# Patient Record
Sex: MALE
[De-identification: names, ages, dates, MRNs, and addresses within clinical notes are randomized per-mention and may not be internally consistent; named-entity substitution may affect disease eponyms.]

---

## 2022-10-28 ENCOUNTER — NURSE TRIAGE (OUTPATIENT)
Dept: OTHER | Facility: CLINIC | Age: 77
End: 2022-10-28

## 2022-10-28 NOTE — TELEPHONE ENCOUNTER
Location of patient: [place]    Subjective: Caller states Sunday was putting on shoe and toenail pulled back, taped it down, now getting red, \"not great to walk on\"     Current Symptoms: left foot great toe,toe nail yellowish, mild swelling,redness on toe starting at [place] and goes to top of toe    Onset: Sunday    Associated Symptoms: NA    Pain Severity: 1/10 when sitting 3/10 when walking     Temperature: Denies     What has been tried: Neoporin     Recommended disposition: See PCP within 3 Days  UCCif no appt. available   Care advice provided, patient verbalizes understanding; denies any other questions or concerns; instructed to call back for any new or worsening symptoms. Patient/caller agrees to follow-up with PCP Sukh Artis [id number] if needed     This triage is a result of a call to [address] [place] [address]. Please do not respond to the triage nurse through this encounter. Any subsequent communication should be directly with the patient. Reason for Disposition   Localized redness and swelling of skin around nail (i.e., cuticle area or nail fold)    Protocols used:  Toe Pain-ADULT-[place]

## 2023-02-19 PROBLEM — I25.10 CORONARY ARTERIOSCLEROSIS: Status: ACTIVE | Noted: 2020-06-12

## 2023-02-19 PROBLEM — D64.9 ANEMIA: Status: ACTIVE | Noted: 2019-03-12

## 2023-02-19 PROBLEM — N40.0 BENIGN PROSTATIC HYPERPLASIA WITHOUT URINARY OBSTRUCTION: Status: ACTIVE | Noted: 2019-03-12

## 2023-02-19 PROBLEM — M43.10 ACQUIRED SPONDYLOLISTHESIS: Status: ACTIVE | Noted: 2023-02-19

## 2023-02-19 PROBLEM — G47.33 OBSTRUCTIVE SLEEP APNEA SYNDROME: Status: ACTIVE | Noted: 2019-03-12

## 2023-02-19 PROBLEM — E66.01 MORBID OBESITY (MULTI): Status: ACTIVE | Noted: 2019-03-12

## 2023-02-19 RX ORDER — DOXAZOSIN 4 MG/1
1 TABLET ORAL DAILY
COMMUNITY
Start: 2012-07-05 | End: 2023-03-13

## 2023-02-19 RX ORDER — TRIAMCINOLONE ACETONIDE 1 MG/G
CREAM TOPICAL 2 TIMES DAILY PRN
COMMUNITY
Start: 2017-05-19

## 2023-02-19 RX ORDER — SILDENAFIL 50 MG/1
2 TABLET, FILM COATED ORAL AS NEEDED
COMMUNITY
Start: 2021-12-20

## 2023-02-19 RX ORDER — FOLIC ACID 1 MG/1
1 TABLET ORAL DAILY
COMMUNITY

## 2023-02-19 RX ORDER — NYSTATIN 100000 [USP'U]/G
POWDER TOPICAL 2 TIMES DAILY
COMMUNITY
Start: 2021-09-28

## 2023-02-19 RX ORDER — HYDROCHLOROTHIAZIDE 25 MG/1
1 TABLET ORAL DAILY
COMMUNITY
Start: 2020-02-03 | End: 2023-05-09 | Stop reason: SDUPTHER

## 2023-02-19 RX ORDER — PIOGLITAZONEHYDROCHLORIDE 15 MG/1
1 TABLET ORAL DAILY
COMMUNITY
Start: 2018-12-21 | End: 2023-03-13

## 2023-02-19 RX ORDER — LOSARTAN POTASSIUM 100 MG/1
1 TABLET ORAL DAILY
COMMUNITY
Start: 2020-02-03 | End: 2023-05-08 | Stop reason: SDUPTHER

## 2023-02-19 RX ORDER — ATORVASTATIN CALCIUM 10 MG/1
20 TABLET, FILM COATED ORAL DAILY
COMMUNITY
Start: 2006-02-17 | End: 2023-04-03 | Stop reason: DRUGHIGH

## 2023-02-19 RX ORDER — ECONAZOLE NITRATE 10 MG/G
CREAM TOPICAL 2 TIMES DAILY PRN
COMMUNITY

## 2023-02-19 RX ORDER — ACETAMINOPHEN, DEXTROMETHORPHAN HBR, DOXYLAMINE SUCCINATE, PHENYLEPHRINE HCL 650; 20; 12.5; 1 MG/30ML; MG/30ML; MG/30ML; MG/30ML
1 SOLUTION ORAL DAILY
COMMUNITY

## 2023-02-19 RX ORDER — AMLODIPINE BESYLATE 10 MG/1
1 TABLET ORAL DAILY
COMMUNITY
Start: 2017-12-14 | End: 2023-03-13

## 2023-02-19 RX ORDER — METFORMIN HYDROCHLORIDE 1000 MG/1
1 TABLET ORAL 2 TIMES DAILY
COMMUNITY
Start: 2011-04-15 | End: 2023-03-13

## 2023-02-19 RX ORDER — LORATADINE 10 MG/1
1 TABLET ORAL DAILY
COMMUNITY

## 2023-02-19 RX ORDER — TRIAMCINOLONE ACETONIDE 55 UG/1
1-2 SPRAY, METERED NASAL DAILY
COMMUNITY

## 2023-02-19 RX ORDER — SPIRONOLACTONE 25 MG/1
1 TABLET ORAL DAILY
COMMUNITY
Start: 2010-03-23 | End: 2023-03-13

## 2023-03-13 DIAGNOSIS — E11.69 TYPE 2 DIABETES MELLITUS WITH OTHER SPECIFIED COMPLICATION, WITHOUT LONG-TERM CURRENT USE OF INSULIN (MULTI): ICD-10-CM

## 2023-03-13 DIAGNOSIS — I10 HYPERTENSION, UNSPECIFIED TYPE: ICD-10-CM

## 2023-03-13 DIAGNOSIS — N40.0 BENIGN PROSTATIC HYPERPLASIA, UNSPECIFIED WHETHER LOWER URINARY TRACT SYMPTOMS PRESENT: Primary | ICD-10-CM

## 2023-03-13 RX ORDER — DOXAZOSIN 4 MG/1
TABLET ORAL
Qty: 90 TABLET | Refills: 3 | Status: SHIPPED | OUTPATIENT
Start: 2023-03-13 | End: 2024-05-22 | Stop reason: SDUPTHER

## 2023-03-13 RX ORDER — AMLODIPINE BESYLATE 10 MG/1
TABLET ORAL
Qty: 90 TABLET | Refills: 3 | Status: SHIPPED | OUTPATIENT
Start: 2023-03-13 | End: 2023-10-03 | Stop reason: SDUPTHER

## 2023-03-13 RX ORDER — METFORMIN HYDROCHLORIDE 1000 MG/1
TABLET ORAL
Qty: 180 TABLET | Refills: 3 | Status: SHIPPED | OUTPATIENT
Start: 2023-03-13 | End: 2023-10-03 | Stop reason: SDUPTHER

## 2023-03-13 RX ORDER — SPIRONOLACTONE 25 MG/1
TABLET ORAL
Qty: 90 TABLET | Refills: 3 | Status: SHIPPED | OUTPATIENT
Start: 2023-03-13 | End: 2024-03-15 | Stop reason: SDUPTHER

## 2023-03-13 RX ORDER — PIOGLITAZONEHYDROCHLORIDE 15 MG/1
TABLET ORAL
Qty: 90 TABLET | Refills: 3 | Status: SHIPPED | OUTPATIENT
Start: 2023-03-13 | End: 2023-10-03 | Stop reason: SDUPTHER

## 2023-03-14 LAB
APPEARANCE, URINE: CLEAR
BILIRUBIN, URINE: NEGATIVE
BLOOD, URINE: NEGATIVE
COLOR, URINE: YELLOW
GLUCOSE, URINE: NEGATIVE MG/DL
KETONES, URINE: NEGATIVE MG/DL
LEUKOCYTE ESTERASE, URINE: NEGATIVE
NITRITE, URINE: NEGATIVE
PH, URINE: 6 (ref 5–8)
PROTEIN, URINE: NEGATIVE MG/DL
SPECIFIC GRAVITY, URINE: 1.01 (ref 1–1.03)
UROBILINOGEN, URINE: <2 MG/DL (ref 0–1.9)

## 2023-03-21 ENCOUNTER — APPOINTMENT (OUTPATIENT)
Dept: PRIMARY CARE | Facility: CLINIC | Age: 78
End: 2023-03-21
Payer: COMMERCIAL

## 2023-04-03 ENCOUNTER — OFFICE VISIT (OUTPATIENT)
Dept: PRIMARY CARE | Facility: CLINIC | Age: 78
End: 2023-04-03
Payer: COMMERCIAL

## 2023-04-03 VITALS
HEART RATE: 84 BPM | HEIGHT: 66 IN | WEIGHT: 264 LBS | OXYGEN SATURATION: 98 % | BODY MASS INDEX: 42.43 KG/M2 | DIASTOLIC BLOOD PRESSURE: 82 MMHG | TEMPERATURE: 97.6 F | SYSTOLIC BLOOD PRESSURE: 122 MMHG

## 2023-04-03 DIAGNOSIS — I10 ESSENTIAL HYPERTENSION: Primary | ICD-10-CM

## 2023-04-03 DIAGNOSIS — E11.69 TYPE 2 DIABETES MELLITUS WITH OTHER SPECIFIED COMPLICATION, WITHOUT LONG-TERM CURRENT USE OF INSULIN (MULTI): ICD-10-CM

## 2023-04-03 DIAGNOSIS — E78.5 HYPERLIPIDEMIA, UNSPECIFIED HYPERLIPIDEMIA TYPE: ICD-10-CM

## 2023-04-03 DIAGNOSIS — N40.0 BENIGN PROSTATIC HYPERPLASIA WITHOUT URINARY OBSTRUCTION: ICD-10-CM

## 2023-04-03 DIAGNOSIS — D64.9 ANEMIA, UNSPECIFIED TYPE: ICD-10-CM

## 2023-04-03 DIAGNOSIS — L29.9 PRURITUS: ICD-10-CM

## 2023-04-03 DIAGNOSIS — R06.00 DYSPNEA, UNSPECIFIED TYPE: ICD-10-CM

## 2023-04-03 DIAGNOSIS — I25.10 CORONARY ARTERIOSCLEROSIS: ICD-10-CM

## 2023-04-03 PROCEDURE — 3079F DIAST BP 80-89 MM HG: CPT | Performed by: FAMILY MEDICINE

## 2023-04-03 PROCEDURE — 1160F RVW MEDS BY RX/DR IN RCRD: CPT | Performed by: FAMILY MEDICINE

## 2023-04-03 PROCEDURE — 3074F SYST BP LT 130 MM HG: CPT | Performed by: FAMILY MEDICINE

## 2023-04-03 PROCEDURE — 99214 OFFICE O/P EST MOD 30 MIN: CPT | Performed by: FAMILY MEDICINE

## 2023-04-03 PROCEDURE — 1159F MED LIST DOCD IN RCRD: CPT | Performed by: FAMILY MEDICINE

## 2023-04-03 PROCEDURE — 1036F TOBACCO NON-USER: CPT | Performed by: FAMILY MEDICINE

## 2023-04-03 RX ORDER — ASPIRIN 81 MG/1
1 TABLET ORAL DAILY
COMMUNITY

## 2023-04-03 RX ORDER — MULTIVITAMIN
TABLET ORAL
COMMUNITY

## 2023-04-03 RX ORDER — ATORVASTATIN CALCIUM 20 MG/1
20 TABLET, FILM COATED ORAL DAILY
Qty: 30 TABLET | Refills: 5 | Status: SHIPPED | OUTPATIENT
Start: 2023-04-03 | End: 2023-09-13 | Stop reason: SDUPTHER

## 2023-04-03 RX ORDER — PREDNISONE 20 MG/1
40 TABLET ORAL DAILY
Qty: 10 TABLET | Refills: 0 | Status: SHIPPED | OUTPATIENT
Start: 2023-04-03 | End: 2023-04-08

## 2023-04-03 ASSESSMENT — PATIENT HEALTH QUESTIONNAIRE - PHQ9
1. LITTLE INTEREST OR PLEASURE IN DOING THINGS: NOT AT ALL
SUM OF ALL RESPONSES TO PHQ9 QUESTIONS 1 AND 2: 0
2. FEELING DOWN, DEPRESSED OR HOPELESS: NOT AT ALL

## 2023-04-03 NOTE — PROGRESS NOTES
"Subjective   Patient ID: Tomas Morataya is a 78 y.o. male who presents for Hypertension, Diabetes, Hyperlipidemia (Review BW), and Itching (All over x 3 weeks) and multiple issues    HPI   DM: Due for recheck.  Did not have blood work  Lipids: Have been stable  Hypertension: stable and controlled  CAD: Stable.  No chest pains  Anemia stable.  Recent H and H12.4/38.1. no unusual bleeding  BPH: Stable.  Recent he had hydrocele surgery  Chronic SOB: Requesting handicap placard.  Often has trouble walking long distances.  Pruritus: Onset x 10 wks. Completed steroids. Min helped. No new exposures. Worse on arms and back. Wife w/o symptoms. No assoc F/Chlls/CP/SOB. Very itchy.     Review of Systems   Constitutional:  Negative for fatigue.   HENT: Negative.     Eyes:  Negative for visual disturbance.   Respiratory:  Negative for shortness of breath.    Cardiovascular:  Negative for chest pain and palpitations.   Gastrointestinal:  Negative for abdominal distention, abdominal pain, blood in stool, constipation, diarrhea, nausea and vomiting.   Endocrine: Negative for cold intolerance, heat intolerance, polydipsia, polyphagia and polyuria.   Genitourinary:  Negative for difficulty urinating and dysuria.   Musculoskeletal:  Negative for arthralgias and myalgias.   Skin:         As above   Neurological:  Negative for dizziness and headaches.   Psychiatric/Behavioral:  Negative for sleep disturbance.        Objective   /82   Pulse 84   Temp 36.4 °C (97.6 °F)   Ht 1.676 m (5' 6\")   Wt 120 kg (264 lb)   SpO2 98%   BMI 42.61 kg/m²     Physical Exam  Vitals and nursing note reviewed.   Constitutional:       General: He is not in acute distress.     Appearance: Normal appearance. He is not toxic-appearing.   HENT:      Head: Normocephalic.   Eyes:      Pupils: Pupils are equal, round, and reactive to light.   Cardiovascular:      Rate and Rhythm: Normal rate and regular rhythm.      Pulses: Normal pulses.      Heart " sounds: No murmur heard.  Pulmonary:      Effort: Pulmonary effort is normal. No respiratory distress.      Breath sounds: Normal breath sounds.   Abdominal:      General: Bowel sounds are normal.      Palpations: Abdomen is soft.      Tenderness: There is no abdominal tenderness. There is no guarding.   Musculoskeletal:         General: No tenderness.   Skin:     General: Skin is warm.      Comments: +large confluent erythematous raised patches b/l upper extremities. No vesicles. +excoriations b/l arms and upper back.    Neurological:      General: No focal deficit present.      Mental Status: He is alert.      Cranial Nerves: No cranial nerve deficit.   Psychiatric:         Mood and Affect: Mood normal.         Assessment/Plan   Problem List Items Addressed This Visit          Circulatory    Essential hypertension - Primary    Relevant Medications    atorvastatin (Lipitor) 20 mg tablet    Other Relevant Orders    Comprehensive Metabolic Panel    Follow Up In Primary Care    Comprehensive Metabolic Panel    Lipid Panel    Coronary arteriosclerosis    Relevant Medications    atorvastatin (Lipitor) 20 mg tablet    Other Relevant Orders    Comprehensive Metabolic Panel    Follow Up In Primary Care    Comprehensive Metabolic Panel    Lipid Panel       Genitourinary    Benign prostatic hyperplasia without urinary obstruction    Relevant Orders    Follow Up In Primary Care       Endocrine/Metabolic    Type 2 diabetes mellitus (CMS/HCC)    Relevant Medications    atorvastatin (Lipitor) 20 mg tablet    Other Relevant Orders    Comprehensive Metabolic Panel    Hemoglobin A1C    Follow Up In Primary Care    Comprehensive Metabolic Panel    Hemoglobin A1C    Lipid Panel       Hematologic    Anemia    Relevant Orders    Follow Up In Primary Care       Other    Hyperlipidemia    Relevant Medications    atorvastatin (Lipitor) 20 mg tablet    Other Relevant Orders    Comprehensive Metabolic Panel    Follow Up In Primary Care     Comprehensive Metabolic Panel    Lipid Panel     Other Visit Diagnoses       Pruritus        Relevant Medications    predniSONE (Deltasone) 20 mg tablet    Other Relevant Orders    Comprehensive Metabolic Panel    Dyspnea, unspecified type        Relevant Orders    Disability Placard        Reviewed blood work

## 2023-04-03 NOTE — PATIENT INSTRUCTIONS
Obtain your blood work    Start steroids. If no improvement, recommend you follow up with dermatology. Monitor your blood sugar.    Continue your other meds.     Recommend a predominantly whole foods plant based diet.  Cut back on meat, dairy, salt and oils. Increase fiber in your diet.  Recommend regular exercise most days of the week.    Follow up with your specialists as scheduled    Follow up in 6 months, sooner if needed

## 2023-04-04 ASSESSMENT — ENCOUNTER SYMPTOMS
ROS SKIN COMMENTS: AS ABOVE
CONSTIPATION: 0
POLYDIPSIA: 0
VOMITING: 0
HEADACHES: 0
ABDOMINAL PAIN: 0
DYSURIA: 0
SHORTNESS OF BREATH: 0
MYALGIAS: 0
DIARRHEA: 0
BLOOD IN STOOL: 0
PALPITATIONS: 0
ARTHRALGIAS: 0
DIFFICULTY URINATING: 0
DIZZINESS: 0
NAUSEA: 0
FATIGUE: 0
POLYPHAGIA: 0
ABDOMINAL DISTENTION: 0
SLEEP DISTURBANCE: 0

## 2023-04-07 ENCOUNTER — LAB (OUTPATIENT)
Dept: LAB | Facility: LAB | Age: 78
End: 2023-04-07
Payer: COMMERCIAL

## 2023-04-07 DIAGNOSIS — I10 ESSENTIAL HYPERTENSION: ICD-10-CM

## 2023-04-07 DIAGNOSIS — I25.10 CORONARY ARTERIOSCLEROSIS: ICD-10-CM

## 2023-04-07 DIAGNOSIS — E78.5 HYPERLIPIDEMIA, UNSPECIFIED HYPERLIPIDEMIA TYPE: ICD-10-CM

## 2023-04-07 DIAGNOSIS — E11.69 TYPE 2 DIABETES MELLITUS WITH OTHER SPECIFIED COMPLICATION, WITHOUT LONG-TERM CURRENT USE OF INSULIN (MULTI): ICD-10-CM

## 2023-04-07 DIAGNOSIS — L29.9 PRURITUS: ICD-10-CM

## 2023-04-07 LAB
ALANINE AMINOTRANSFERASE (SGPT) (U/L) IN SER/PLAS: 18 U/L (ref 10–52)
ALBUMIN (G/DL) IN SER/PLAS: 3.4 G/DL (ref 3.4–5)
ALKALINE PHOSPHATASE (U/L) IN SER/PLAS: 60 U/L (ref 33–136)
ANION GAP IN SER/PLAS: 14 MMOL/L (ref 10–20)
ASPARTATE AMINOTRANSFERASE (SGOT) (U/L) IN SER/PLAS: 15 U/L (ref 9–39)
BILIRUBIN TOTAL (MG/DL) IN SER/PLAS: 0.5 MG/DL (ref 0–1.2)
CALCIUM (MG/DL) IN SER/PLAS: 8.6 MG/DL (ref 8.6–10.3)
CARBON DIOXIDE, TOTAL (MMOL/L) IN SER/PLAS: 24 MMOL/L (ref 21–32)
CHLORIDE (MMOL/L) IN SER/PLAS: 102 MMOL/L (ref 98–107)
CREATININE (MG/DL) IN SER/PLAS: 0.95 MG/DL (ref 0.5–1.3)
ESTIMATED AVERAGE GLUCOSE FOR HBA1C: 166 MG/DL
GFR MALE: 83 ML/MIN/1.73M2
GLUCOSE (MG/DL) IN SER/PLAS: 248 MG/DL (ref 74–99)
HEMOGLOBIN A1C/HEMOGLOBIN TOTAL IN BLOOD: 7.4 %
POTASSIUM (MMOL/L) IN SER/PLAS: 4.5 MMOL/L (ref 3.5–5.3)
PROTEIN TOTAL: 6 G/DL (ref 6.4–8.2)
SODIUM (MMOL/L) IN SER/PLAS: 135 MMOL/L (ref 136–145)
UREA NITROGEN (MG/DL) IN SER/PLAS: 25 MG/DL (ref 6–23)

## 2023-04-07 PROCEDURE — 80053 COMPREHEN METABOLIC PANEL: CPT

## 2023-04-07 PROCEDURE — 36415 COLL VENOUS BLD VENIPUNCTURE: CPT

## 2023-04-07 PROCEDURE — 83036 HEMOGLOBIN GLYCOSYLATED A1C: CPT

## 2023-04-21 ENCOUNTER — TELEPHONE (OUTPATIENT)
Dept: PRIMARY CARE | Facility: CLINIC | Age: 78
End: 2023-04-21
Payer: COMMERCIAL

## 2023-04-21 NOTE — TELEPHONE ENCOUNTER
----- Message from Raquel Galvez DO sent at 4/21/2023  1:55 PM EDT -----  Tell patient blood sugar is exactly the same as last visit.  A1c 7.4.  Continue current meds.  Follow-up as scheduled, sooner if needed

## 2023-05-08 DIAGNOSIS — I10 ESSENTIAL HYPERTENSION: Primary | ICD-10-CM

## 2023-05-08 NOTE — TELEPHONE ENCOUNTER
Pt called requesting a refill on pended med and hydrochlorothiazide 25mg, I informed pt that hydrochlorothiazide was discontinued and he stated he never stopped med and needs refills because he is OUT.  Pt need short supply - [facility]  90 - [facility].

## 2023-05-09 RX ORDER — HYDROCHLOROTHIAZIDE 25 MG/1
25 TABLET ORAL DAILY
Qty: 90 TABLET | Refills: 0 | Status: SHIPPED | OUTPATIENT
Start: 2023-05-09 | End: 2024-03-26 | Stop reason: SDUPTHER

## 2023-05-09 RX ORDER — HYDROCHLOROTHIAZIDE 25 MG/1
25 TABLET ORAL DAILY
Qty: 30 TABLET | Refills: 0 | Status: SHIPPED | OUTPATIENT
Start: 2023-05-09 | End: 2023-09-08 | Stop reason: SDUPTHER

## 2023-05-09 RX ORDER — LOSARTAN POTASSIUM 100 MG/1
100 TABLET ORAL DAILY
Qty: 90 TABLET | Refills: 0 | Status: SHIPPED | OUTPATIENT
Start: 2023-05-09 | End: 2023-07-27 | Stop reason: SDUPTHER

## 2023-05-31 DIAGNOSIS — I10 ESSENTIAL HYPERTENSION: ICD-10-CM

## 2023-06-02 RX ORDER — HYDROCHLOROTHIAZIDE 25 MG/1
TABLET ORAL
Qty: 30 TABLET | Refills: 0 | OUTPATIENT
Start: 2023-06-02

## 2023-07-20 DIAGNOSIS — I10 ESSENTIAL HYPERTENSION: ICD-10-CM

## 2023-07-20 NOTE — TELEPHONE ENCOUNTER
Pharmacy Request  Pt was only given 90 day with no refills, pt would be due for refill in August. Next OV 10/3/23, please advise. AM

## 2023-07-26 RX ORDER — LOSARTAN POTASSIUM 100 MG/1
100 TABLET ORAL DAILY
Qty: 90 TABLET | Refills: 3 | OUTPATIENT
Start: 2023-07-26

## 2023-07-27 DIAGNOSIS — I10 ESSENTIAL HYPERTENSION: ICD-10-CM

## 2023-07-27 RX ORDER — LOSARTAN POTASSIUM 100 MG/1
100 TABLET ORAL DAILY
Qty: 90 TABLET | Refills: 0 | Status: SHIPPED | OUTPATIENT
Start: 2023-07-27 | End: 2023-10-03 | Stop reason: SDUPTHER

## 2023-08-14 DIAGNOSIS — I10 ESSENTIAL HYPERTENSION: ICD-10-CM

## 2023-08-18 RX ORDER — HYDROCHLOROTHIAZIDE 25 MG/1
25 TABLET ORAL DAILY
Qty: 90 TABLET | Refills: 3 | OUTPATIENT
Start: 2023-08-18

## 2023-09-08 DIAGNOSIS — E78.5 HYPERLIPIDEMIA, UNSPECIFIED HYPERLIPIDEMIA TYPE: ICD-10-CM

## 2023-09-08 DIAGNOSIS — E11.69 TYPE 2 DIABETES MELLITUS WITH OTHER SPECIFIED COMPLICATION, WITHOUT LONG-TERM CURRENT USE OF INSULIN (MULTI): ICD-10-CM

## 2023-09-08 DIAGNOSIS — I10 ESSENTIAL HYPERTENSION: ICD-10-CM

## 2023-09-08 DIAGNOSIS — I25.10 CORONARY ARTERIOSCLEROSIS: ICD-10-CM

## 2023-09-12 DIAGNOSIS — E11.69 TYPE 2 DIABETES MELLITUS WITH OTHER SPECIFIED COMPLICATION, WITHOUT LONG-TERM CURRENT USE OF INSULIN (MULTI): ICD-10-CM

## 2023-09-12 DIAGNOSIS — E78.5 HYPERLIPIDEMIA, UNSPECIFIED HYPERLIPIDEMIA TYPE: ICD-10-CM

## 2023-09-12 DIAGNOSIS — I10 ESSENTIAL HYPERTENSION: ICD-10-CM

## 2023-09-12 DIAGNOSIS — I25.10 CORONARY ARTERIOSCLEROSIS: ICD-10-CM

## 2023-09-13 RX ORDER — ATORVASTATIN CALCIUM 20 MG/1
20 TABLET, FILM COATED ORAL DAILY
Qty: 90 TABLET | Refills: 1 | Status: SHIPPED | OUTPATIENT
Start: 2023-09-13 | End: 2023-10-03 | Stop reason: SDUPTHER

## 2023-09-13 RX ORDER — HYDROCHLOROTHIAZIDE 25 MG/1
25 TABLET ORAL DAILY
Qty: 90 TABLET | Refills: 1 | Status: SHIPPED | OUTPATIENT
Start: 2023-09-13 | End: 2023-10-03 | Stop reason: SDUPTHER

## 2023-09-13 RX ORDER — ATORVASTATIN CALCIUM 20 MG/1
20 TABLET, FILM COATED ORAL DAILY
Qty: 90 TABLET | Refills: 3 | OUTPATIENT
Start: 2023-09-13

## 2023-09-28 ENCOUNTER — LAB (OUTPATIENT)
Dept: LAB | Facility: LAB | Age: 78
End: 2023-09-28
Payer: COMMERCIAL

## 2023-09-28 DIAGNOSIS — E11.69 TYPE 2 DIABETES MELLITUS WITH OTHER SPECIFIED COMPLICATION, WITHOUT LONG-TERM CURRENT USE OF INSULIN (MULTI): ICD-10-CM

## 2023-09-28 DIAGNOSIS — I10 ESSENTIAL HYPERTENSION: ICD-10-CM

## 2023-09-28 DIAGNOSIS — I25.10 CORONARY ARTERIOSCLEROSIS: ICD-10-CM

## 2023-09-28 DIAGNOSIS — E78.5 HYPERLIPIDEMIA, UNSPECIFIED HYPERLIPIDEMIA TYPE: ICD-10-CM

## 2023-09-28 LAB
ALANINE AMINOTRANSFERASE (SGPT) (U/L) IN SER/PLAS: 19 U/L (ref 10–52)
ALBUMIN (G/DL) IN SER/PLAS: 3.7 G/DL (ref 3.4–5)
ALKALINE PHOSPHATASE (U/L) IN SER/PLAS: 58 U/L (ref 33–136)
ANION GAP IN SER/PLAS: 13 MMOL/L (ref 10–20)
ASPARTATE AMINOTRANSFERASE (SGOT) (U/L) IN SER/PLAS: 20 U/L (ref 9–39)
BILIRUBIN TOTAL (MG/DL) IN SER/PLAS: 0.7 MG/DL (ref 0–1.2)
CALCIUM (MG/DL) IN SER/PLAS: 9 MG/DL (ref 8.6–10.3)
CARBON DIOXIDE, TOTAL (MMOL/L) IN SER/PLAS: 26 MMOL/L (ref 21–32)
CHLORIDE (MMOL/L) IN SER/PLAS: 103 MMOL/L (ref 98–107)
CHOLESTEROL (MG/DL) IN SER/PLAS: 169 MG/DL (ref 0–199)
CHOLESTEROL IN HDL (MG/DL) IN SER/PLAS: 51.9 MG/DL
CHOLESTEROL/HDL RATIO: 3.3
CREATININE (MG/DL) IN SER/PLAS: 0.95 MG/DL (ref 0.5–1.3)
ESTIMATED AVERAGE GLUCOSE FOR HBA1C: 157 MG/DL
GFR MALE: 83 ML/MIN/1.73M2
GLUCOSE (MG/DL) IN SER/PLAS: 160 MG/DL (ref 74–99)
HEMOGLOBIN A1C/HEMOGLOBIN TOTAL IN BLOOD: 7.1 %
LDL: 97 MG/DL (ref 0–99)
POTASSIUM (MMOL/L) IN SER/PLAS: 4.6 MMOL/L (ref 3.5–5.3)
PROTEIN TOTAL: 6.1 G/DL (ref 6.4–8.2)
SODIUM (MMOL/L) IN SER/PLAS: 137 MMOL/L (ref 136–145)
TRIGLYCERIDE (MG/DL) IN SER/PLAS: 99 MG/DL (ref 0–149)
UREA NITROGEN (MG/DL) IN SER/PLAS: 19 MG/DL (ref 6–23)
VLDL: 20 MG/DL (ref 0–40)

## 2023-09-28 PROCEDURE — 80053 COMPREHEN METABOLIC PANEL: CPT

## 2023-09-28 PROCEDURE — 83036 HEMOGLOBIN GLYCOSYLATED A1C: CPT

## 2023-09-28 PROCEDURE — 80061 LIPID PANEL: CPT

## 2023-09-28 PROCEDURE — 36415 COLL VENOUS BLD VENIPUNCTURE: CPT

## 2023-10-03 ENCOUNTER — OFFICE VISIT (OUTPATIENT)
Dept: PRIMARY CARE | Facility: CLINIC | Age: 78
End: 2023-10-03
Payer: COMMERCIAL

## 2023-10-03 VITALS
WEIGHT: 264 LBS | SYSTOLIC BLOOD PRESSURE: 124 MMHG | BODY MASS INDEX: 42.43 KG/M2 | DIASTOLIC BLOOD PRESSURE: 72 MMHG | HEART RATE: 91 BPM | TEMPERATURE: 97.7 F | OXYGEN SATURATION: 97 % | HEIGHT: 66 IN

## 2023-10-03 DIAGNOSIS — E11.69 TYPE 2 DIABETES MELLITUS WITH OTHER SPECIFIED COMPLICATION, WITHOUT LONG-TERM CURRENT USE OF INSULIN (MULTI): ICD-10-CM

## 2023-10-03 DIAGNOSIS — I10 HYPERTENSION, UNSPECIFIED TYPE: ICD-10-CM

## 2023-10-03 DIAGNOSIS — Z00.00 ROUTINE GENERAL MEDICAL EXAMINATION AT HEALTH CARE FACILITY: Primary | ICD-10-CM

## 2023-10-03 DIAGNOSIS — D51.9 ANEMIA DUE TO VITAMIN B12 DEFICIENCY, UNSPECIFIED B12 DEFICIENCY TYPE: ICD-10-CM

## 2023-10-03 DIAGNOSIS — Z12.11 SCREENING FOR COLORECTAL CANCER: ICD-10-CM

## 2023-10-03 DIAGNOSIS — N40.0 BENIGN PROSTATIC HYPERPLASIA WITHOUT URINARY OBSTRUCTION: ICD-10-CM

## 2023-10-03 DIAGNOSIS — I10 ESSENTIAL HYPERTENSION: ICD-10-CM

## 2023-10-03 DIAGNOSIS — Z12.5 SCREENING FOR PROSTATE CANCER: ICD-10-CM

## 2023-10-03 DIAGNOSIS — E66.01 MORBID OBESITY (MULTI): ICD-10-CM

## 2023-10-03 DIAGNOSIS — I25.10 CORONARY ARTERIOSCLEROSIS: ICD-10-CM

## 2023-10-03 DIAGNOSIS — E78.5 HYPERLIPIDEMIA, UNSPECIFIED HYPERLIPIDEMIA TYPE: ICD-10-CM

## 2023-10-03 DIAGNOSIS — Z12.12 SCREENING FOR COLORECTAL CANCER: ICD-10-CM

## 2023-10-03 PROBLEM — M17.11 ARTHRITIS OF RIGHT KNEE: Status: ACTIVE | Noted: 2023-10-03

## 2023-10-03 PROBLEM — I73.9 PVD (PERIPHERAL VASCULAR DISEASE) (CMS-HCC): Status: ACTIVE | Noted: 2023-10-03

## 2023-10-03 PROBLEM — D48.5 NEOPLASM OF UNCERTAIN BEHAVIOR OF SKIN: Status: ACTIVE | Noted: 2023-05-24

## 2023-10-03 PROBLEM — I87.2 CHRONIC VENOUS INSUFFICIENCY: Status: ACTIVE | Noted: 2023-05-24

## 2023-10-03 PROBLEM — D23.70 BENIGN NEOPLASM OF SKIN OF FOOT: Status: ACTIVE | Noted: 2022-10-14

## 2023-10-03 LAB
POC ALBUMIN /CREATININE RATIO MANUALLY ENTERED: <30 UG/MG CREAT
POC URINE ALBUMIN: 10 MG/L
POC URINE CREATININE: 100 MG/DL

## 2023-10-03 PROCEDURE — G0444 DEPRESSION SCREEN ANNUAL: HCPCS | Performed by: FAMILY MEDICINE

## 2023-10-03 PROCEDURE — G0446 INTENS BEHAVE THER CARDIO DX: HCPCS | Performed by: FAMILY MEDICINE

## 2023-10-03 PROCEDURE — 1036F TOBACCO NON-USER: CPT | Performed by: FAMILY MEDICINE

## 2023-10-03 PROCEDURE — G0439 PPPS, SUBSEQ VISIT: HCPCS | Performed by: FAMILY MEDICINE

## 2023-10-03 PROCEDURE — 99214 OFFICE O/P EST MOD 30 MIN: CPT | Performed by: FAMILY MEDICINE

## 2023-10-03 PROCEDURE — 3074F SYST BP LT 130 MM HG: CPT | Performed by: FAMILY MEDICINE

## 2023-10-03 PROCEDURE — 1160F RVW MEDS BY RX/DR IN RCRD: CPT | Performed by: FAMILY MEDICINE

## 2023-10-03 PROCEDURE — 90471 IMMUNIZATION ADMIN: CPT | Performed by: FAMILY MEDICINE

## 2023-10-03 PROCEDURE — 1170F FXNL STATUS ASSESSED: CPT | Performed by: FAMILY MEDICINE

## 2023-10-03 PROCEDURE — 1159F MED LIST DOCD IN RCRD: CPT | Performed by: FAMILY MEDICINE

## 2023-10-03 PROCEDURE — 3078F DIAST BP <80 MM HG: CPT | Performed by: FAMILY MEDICINE

## 2023-10-03 PROCEDURE — 90677 PCV20 VACCINE IM: CPT | Performed by: FAMILY MEDICINE

## 2023-10-03 PROCEDURE — 82044 UR ALBUMIN SEMIQUANTITATIVE: CPT | Performed by: FAMILY MEDICINE

## 2023-10-03 RX ORDER — METFORMIN HYDROCHLORIDE 1000 MG/1
1000 TABLET ORAL 2 TIMES DAILY
Qty: 180 TABLET | Refills: 1 | Status: SHIPPED | OUTPATIENT
Start: 2023-10-03 | End: 2024-04-04 | Stop reason: SDUPTHER

## 2023-10-03 RX ORDER — HYDROCHLOROTHIAZIDE 25 MG/1
25 TABLET ORAL DAILY
Qty: 90 TABLET | Refills: 1 | Status: SHIPPED | OUTPATIENT
Start: 2023-10-03 | End: 2024-03-26 | Stop reason: SDUPTHER

## 2023-10-03 RX ORDER — HYDROCHLOROTHIAZIDE 25 MG/1
25 TABLET ORAL DAILY
Qty: 30 TABLET | Refills: 0 | Status: SHIPPED | OUTPATIENT
Start: 2023-10-03 | End: 2024-03-15 | Stop reason: SDUPTHER

## 2023-10-03 RX ORDER — ATORVASTATIN CALCIUM 40 MG/1
40 TABLET, FILM COATED ORAL DAILY
Qty: 90 TABLET | Refills: 1 | Status: SHIPPED | OUTPATIENT
Start: 2023-10-03 | End: 2024-03-07 | Stop reason: SDUPTHER

## 2023-10-03 RX ORDER — PIOGLITAZONEHYDROCHLORIDE 15 MG/1
15 TABLET ORAL DAILY
Qty: 90 TABLET | Refills: 1 | Status: SHIPPED | OUTPATIENT
Start: 2023-10-03 | End: 2024-04-04 | Stop reason: SDUPTHER

## 2023-10-03 RX ORDER — LOSARTAN POTASSIUM 100 MG/1
100 TABLET ORAL DAILY
Qty: 90 TABLET | Refills: 0 | Status: SHIPPED | OUTPATIENT
Start: 2023-10-03 | End: 2024-01-24 | Stop reason: SDUPTHER

## 2023-10-03 RX ORDER — ATORVASTATIN CALCIUM 20 MG/1
20 TABLET, FILM COATED ORAL DAILY
Qty: 90 TABLET | Refills: 1 | Status: SHIPPED | OUTPATIENT
Start: 2023-10-03 | End: 2023-10-03 | Stop reason: SDUPTHER

## 2023-10-03 RX ORDER — AMLODIPINE BESYLATE 10 MG/1
10 TABLET ORAL DAILY
Qty: 90 TABLET | Refills: 1 | Status: SHIPPED | OUTPATIENT
Start: 2023-10-03 | End: 2024-04-04 | Stop reason: SDUPTHER

## 2023-10-03 ASSESSMENT — ENCOUNTER SYMPTOMS
PALPITATIONS: 0
ABDOMINAL PAIN: 0
ABDOMINAL DISTENTION: 0
SLEEP DISTURBANCE: 0
POLYDIPSIA: 0
POLYPHAGIA: 0
HEADACHES: 0
SHORTNESS OF BREATH: 0
BLOOD IN STOOL: 0
DYSPHORIC MOOD: 0
DIFFICULTY URINATING: 0
DIARRHEA: 0
VOMITING: 0
DYSURIA: 0
FATIGUE: 0
CONSTIPATION: 0
DIZZINESS: 0
NAUSEA: 0

## 2023-10-03 ASSESSMENT — ACTIVITIES OF DAILY LIVING (ADL)
BATHING: INDEPENDENT
DOING_HOUSEWORK: INDEPENDENT
DRESSING: INDEPENDENT
TAKING_MEDICATION: INDEPENDENT
MANAGING_FINANCES: INDEPENDENT
GROCERY_SHOPPING: INDEPENDENT

## 2023-10-03 ASSESSMENT — PATIENT HEALTH QUESTIONNAIRE - PHQ9
SUM OF ALL RESPONSES TO PHQ9 QUESTIONS 1 AND 2: 0
1. LITTLE INTEREST OR PLEASURE IN DOING THINGS: NOT AT ALL
2. FEELING DOWN, DEPRESSED OR HOPELESS: NOT AT ALL

## 2023-10-03 NOTE — PATIENT INSTRUCTIONS
Recommend a predominant whole foods plant based diet.  Cut back on meat, dairy, salt and oils. Increase fiber in your diet.  Decrease alcohol as much as possible if you drink. Recommend regular exercise most days of the week.    Continue your current meds but increase atorvastatin    Follow up with your specialists as scheduled    You were referred for cologuard.     Return in 6 months, sooner if needed

## 2023-10-03 NOTE — PROGRESS NOTES
"Subjective   Reason for Visit: Tomas Morataya is an 78 y.o. male here for a Medicare Wellness visit, and multiple issues.     Past Medical, Surgical, and Family History reviewed and updated in chart.    Reviewed all medications by prescribing practitioner or clinical pharmacist (such as prescriptions, OTCs, herbal therapies and supplements) and documented in the medical record.    HPI    Patient Care Team:  Raquel Galvez DO as PCP - General   Dr. Red-urology  Dr. Laird-dermatology  Dr. Higginbotham-podiatry    DM: controlled. A1c 7.1%  Lipids: stable. HDL 51, LDL 97, TG 99  BPH: stable. Seeing urology  CAD: stable. Seeing cardiology soon for high Calcium score test  HTN: controlled.   BMI: high. Trying to work on diet.   B12/anemia: has been stable. On daily b12    Pain: chronic LBP and knee pain    Review of Systems   Constitutional:  Negative for fatigue.   HENT: Negative.     Eyes:  Negative for visual disturbance.   Respiratory:  Negative for shortness of breath.    Cardiovascular:  Negative for chest pain and palpitations.   Gastrointestinal:  Negative for abdominal distention, abdominal pain, blood in stool, constipation, diarrhea, nausea and vomiting.   Endocrine: Negative for cold intolerance, heat intolerance, polydipsia, polyphagia and polyuria.   Genitourinary:  Negative for difficulty urinating and dysuria.   Skin:  Negative for rash.   Neurological:  Negative for dizziness and headaches.   Psychiatric/Behavioral:  Negative for dysphoric mood and sleep disturbance.        Objective   Vitals:  /72   Pulse 91   Temp 36.5 °C (97.7 °F)   Ht 1.676 m (5' 6\")   Wt 120 kg (264 lb)   SpO2 97%   BMI 42.61 kg/m²       Physical Exam  Vitals and nursing note reviewed.   Constitutional:       Appearance: Normal appearance. He is not toxic-appearing or diaphoretic.   HENT:      Head: Normocephalic.      Right Ear: Tympanic membrane normal.      Left Ear: Tympanic membrane normal.      Nose: Nose normal.     "  Mouth/Throat:      Pharynx: Oropharynx is clear.   Eyes:      General: No scleral icterus.     Pupils: Pupils are equal, round, and reactive to light.   Neck:      Vascular: No carotid bruit.   Cardiovascular:      Rate and Rhythm: Normal rate and regular rhythm.      Pulses: Normal pulses.           Dorsalis pedis pulses are 2+ on the right side and 2+ on the left side.        Posterior tibial pulses are 2+ on the right side and 2+ on the left side.      Heart sounds: No murmur heard.  Pulmonary:      Effort: Pulmonary effort is normal. No respiratory distress.      Breath sounds: Normal breath sounds.   Abdominal:      General: Bowel sounds are normal.      Palpations: Abdomen is soft.      Tenderness: There is no abdominal tenderness. There is no guarding.   Musculoskeletal:         General: No tenderness.      Cervical back: Neck supple.      Right lower leg: Edema (trace) present.      Left lower leg: Edema (trace) present.   Feet:      Right foot:      Protective Sensation: 3 sites tested.  3 sites sensed.      Left foot:      Protective Sensation: 3 sites tested.  3 sites sensed.   Lymphadenopathy:      Cervical: No cervical adenopathy.   Skin:     General: Skin is warm.   Neurological:      General: No focal deficit present.      Mental Status: He is alert.      Cranial Nerves: No cranial nerve deficit.   Psychiatric:         Mood and Affect: Mood normal.         Assessment/Plan   Problem List Items Addressed This Visit       Hyperlipidemia    Overview     Note: [facility]         Relevant Medications    atorvastatin (Lipitor) 40 mg tablet    Other Relevant Orders    Comprehensive Metabolic Panel    Type 2 diabetes mellitus (CMS/HCC)    Overview     Note: [facility]         Relevant Medications    metFORMIN (Glucophage) 1,000 mg tablet    pioglitazone (Actos) 15 mg tablet    atorvastatin (Lipitor) 40 mg tablet    Other Relevant Orders    Comprehensive Metabolic Panel    Hemoglobin A1C    POCT Albumin random urine  manually resulted (Completed)    Essential hypertension    Relevant Medications    hydroCHLOROthiazide (HYDRODiuril) 25 mg tablet    losartan (Cozaar) 100 mg tablet    hydroCHLOROthiazide (HYDRODiuril) 25 mg tablet    atorvastatin (Lipitor) 40 mg tablet    Other Relevant Orders    Comprehensive Metabolic Panel    Benign prostatic hyperplasia without urinary obstruction    Overview     Benign prostatic hyperplasia without outflow obstruction         Anemia    Relevant Orders    CBC and Auto Differential    Vitamin B12    Morbid obesity (CMS/HCC)    Coronary arteriosclerosis    Relevant Medications    amLODIPine (Norvasc) 10 mg tablet    atorvastatin (Lipitor) 40 mg tablet    Other Relevant Orders    Comprehensive Metabolic Panel     Other Visit Diagnoses       Routine general medical examination at health care facility    -  Primary    Relevant Orders    Pneumococcal conjugate vaccine, 20-valent (PREVNAR 20) (Completed)    Hypertension, unspecified type        Relevant Medications    amLODIPine (Norvasc) 10 mg tablet    Screening for colorectal cancer        Relevant Orders    Cologuard® colon cancer screening    Screening for prostate cancer        Relevant Orders    Prostate Specific Antigen, Screen    BMI 40.0-44.9, adult (CMS/HCC)            Discussed blood work and wellness issues. Reviewed screenings and immunizations. Recommendations given    ASCVD risk counseling:  discussed ASCVD risk and score 45.7%.  Aspirin not indicated at this time. Lifestyle modifications including nutritional choices, exercise and trying to eliminate habits contributing to risk were discussed. Patient agreeable to make efforts to continue to control their current cardiovascular risk factors. Had high calcium score test. Will be seeing cardiology. On statin

## 2023-10-20 LAB — NONINV COLON CA DNA+OCC BLD SCRN STL QL: POSITIVE

## 2023-10-23 ENCOUNTER — TELEPHONE (OUTPATIENT)
Dept: PRIMARY CARE | Facility: CLINIC | Age: 78
End: 2023-10-23
Payer: COMMERCIAL

## 2023-10-23 DIAGNOSIS — R19.5 POSITIVE COLORECTAL CANCER SCREENING USING COLOGUARD TEST: Primary | ICD-10-CM

## 2023-10-23 NOTE — TELEPHONE ENCOUNTER
----- Message from Raquel Galvez DO sent at 10/23/2023  1:41 PM EDT -----  Pls tell pt that his cologuard was positive. Rec full colonoscopy. Referral in to surgery

## 2023-10-24 ENCOUNTER — TELEPHONE (OUTPATIENT)
Dept: GASTROENTEROLOGY | Facility: CLINIC | Age: 78
End: 2023-10-24
Payer: COMMERCIAL

## 2023-10-24 ENCOUNTER — TELEPHONE (OUTPATIENT)
Dept: PRIMARY CARE | Facility: CLINIC | Age: 78
End: 2023-10-24
Payer: COMMERCIAL

## 2023-10-24 DIAGNOSIS — R19.5 POSITIVE COLORECTAL CANCER SCREENING USING COLOGUARD TEST: Primary | ICD-10-CM

## 2023-10-24 NOTE — TELEPHONE ENCOUNTER
Pt called requesting the referral for general surgery be changed to a referral for gastroenterology - the doctor Iram. This needs to be done so pt can be scheduled ASAP for POS colonguard. He also stated he needed a referral for colonscopy. Thx

## 2023-11-20 ENCOUNTER — OFFICE VISIT (OUTPATIENT)
Dept: GASTROENTEROLOGY | Facility: CLINIC | Age: 78
End: 2023-11-20
Payer: COMMERCIAL

## 2023-11-20 VITALS
DIASTOLIC BLOOD PRESSURE: 81 MMHG | WEIGHT: 260 LBS | SYSTOLIC BLOOD PRESSURE: 136 MMHG | HEIGHT: 66 IN | BODY MASS INDEX: 41.78 KG/M2 | HEART RATE: 82 BPM

## 2023-11-20 DIAGNOSIS — R19.5 POSITIVE COLORECTAL CANCER SCREENING USING COLOGUARD TEST: Primary | ICD-10-CM

## 2023-11-20 PROCEDURE — 1159F MED LIST DOCD IN RCRD: CPT | Performed by: PHYSICIAN ASSISTANT

## 2023-11-20 PROCEDURE — 3075F SYST BP GE 130 - 139MM HG: CPT | Performed by: PHYSICIAN ASSISTANT

## 2023-11-20 PROCEDURE — 1160F RVW MEDS BY RX/DR IN RCRD: CPT | Performed by: PHYSICIAN ASSISTANT

## 2023-11-20 PROCEDURE — 99203 OFFICE O/P NEW LOW 30 MIN: CPT | Performed by: PHYSICIAN ASSISTANT

## 2023-11-20 PROCEDURE — 3078F DIAST BP <80 MM HG: CPT | Performed by: PHYSICIAN ASSISTANT

## 2023-11-20 PROCEDURE — 1036F TOBACCO NON-USER: CPT | Performed by: PHYSICIAN ASSISTANT

## 2023-11-20 RX ORDER — POLYETHYLENE GLYCOL 3350, SODIUM SULFATE ANHYDROUS, SODIUM BICARBONATE, SODIUM CHLORIDE, POTASSIUM CHLORIDE 236; 22.74; 6.74; 5.86; 2.97 G/4L; G/4L; G/4L; G/4L; G/4L
4000 POWDER, FOR SOLUTION ORAL ONCE
Qty: 4000 ML | Refills: 0 | Status: SHIPPED | OUTPATIENT
Start: 2023-11-20 | End: 2023-11-20

## 2023-11-20 NOTE — PROGRESS NOTES
"Subjective   Patient ID: Tomas Morataya is a 78 y.o. male who was referred by his PCP for New Patient Visit (OA Fail: positive cologuard and BMI/Colon: 2013 - per pt.).    Patient's PCP is Dr. Galvez    HPI  Patient had a recent positive cologuard with his PCP. He states that his last colonoscopy was about 10 years ago and he believes that 2 polyps were removed. He is currently asymptomatic. Denies unexplained weight loss, dysphagia, N/V, abdominal pain, change in bowel habits, melena, hematochezia. Occasionally, he will see a little bit of \"pink\" with wiping. No reported family history of colon cancer.    Prior GI evaluation:  Reports colonoscopy 10 years ago that showed 2 small polyps.    Review of Systems:  Constitutional: No reported fever, chills, or weight loss.  Skin: No reported icterus, lesions, or rash.  Eye: No reported itching, pain, vision changes.  Ear: No reported discharge, hearing loss, or pain.  Nose: No reported congestion, discharge, or epistaxis.  Mouth/throat: No reported dysphagia, hoarseness, or throat pain.  Resp: No reported cough, dyspnea, or wheezing.  Cardiovascular: No reported chest pain, lower extremity edema, or palpitations.   GI: No reported abdominal pain, diarrhea, constipation, change in bowel habits, nausea, or vomiting.  : No reported dysuria, hematuria, or frequency.  Neuro: No reported confusion, memory loss, headaches, or dizziness.  Psych: No reported anxiety, depression, or insomnia.  Musculoskeletal: No reported arthralgia, joint swelling, or myalgias.  Heme/lymph: No reported easy bleeding or bruising, or swollen lymph nodes.  Endocrine: No reported cold/heat intolerance, polydipsia, or polyuria.      Medications:  Prior to Admission medications    Medication Sig Start Date End Date Taking? Authorizing Provider   amLODIPine (Norvasc) 10 mg tablet Take 1 tablet (10 mg) by mouth once daily. 10/3/23 3/31/24 Yes Raquel Galvez, DO   aspirin 81 mg EC tablet Take 1 tablet (81 " mg) by mouth once daily.   Yes Historical Provider, MD   atorvastatin (Lipitor) 40 mg tablet Take 1 tablet (40 mg) by mouth once daily. 10/3/23 3/31/24 Yes Raquel Galvez DO   blood sugar diagnostic strip One touch ultra test strips  Test daily Dx: .00 6/20/13  Yes Historical Provider, MD   cyanocobalamin, vitamin B-12, (Vitamin B-12) 1,000 mcg tablet extended release Take 1 tablet (1,000 mcg) by mouth once daily.   Yes Historical Provider, MD   doxazosin (Cardura) 4 mg tablet TAKE 1 TABLET DAILY 3/13/23  Yes Raquel Galvez DO   econazole nitrate 1 % cream Apply topically 2 times a day as needed. Apply to affected   Yes Historical Provider, MD   folic acid (Folvite) 1 mg tablet Take 1 tablet (1 mg) by mouth once daily.   Yes Historical Provider, MD   hydroCHLOROthiazide (HYDRODiuril) 25 mg tablet Take 1 tablet (25 mg) by mouth once daily. 10/3/23 3/31/24 Yes Raquel Galvez DO   loratadine (Claritin) 10 mg tablet Take 1 tablet (10 mg) by mouth once daily.   Yes Historical Provider, MD   losartan (Cozaar) 100 mg tablet Take 1 tablet (100 mg) by mouth once daily. 10/3/23 1/1/24 Yes Raquel Galvez DO   metFORMIN (Glucophage) 1,000 mg tablet Take 1 tablet (1,000 mg) by mouth 2 times a day. 10/3/23 3/31/24 Yes Raquel Galvez DO   multivitamin tablet Take by mouth.   Yes Historical Provider, MD   nystatin (Mycostatin) 100,000 unit/gram powder Apply topically 2 times a day. Until rash cleared 9/28/21  Yes Historical Provider, MD   pioglitazone (Actos) 15 mg tablet Take 1 tablet (15 mg) by mouth once daily. 10/3/23 3/31/24 Yes Raquel Galvez DO   sildenafil (Viagra) 50 mg tablet Take 2 tablets (100 mg) by mouth if needed. Before intercourse 12/20/21  Yes Historical Provider, MD   spironolactone (Aldactone) 25 mg tablet TAKE 1 TABLET DAILY 3/13/23  Yes Raquel E Lenny, DO   triamcinolone (Kenalog) 0.1 % cream Apply topically 2 times a day as needed. 5/19/17  Yes Historical Provider, MD   triamcinolone  (Nasacort) 55 mcg nasal inhaler Administer 1-2 sprays into each nostril once daily.   Yes Historical Provider, MD Jimenez 236-22.74-6.74 -5.86 gram solution Take 4,000 mL by mouth 1 time for 1 dose. 11/20/23 11/20/23  Sandy Bentley PA-C   hydroCHLOROthiazide (HYDRODiuril) 25 mg tablet Take 1 tablet (25 mg) by mouth once daily. 5/9/23 8/7/23  Raquel Galvez DO   hydroCHLOROthiazide (HYDRODiuril) 25 mg tablet Take 1 tablet (25 mg) by mouth once daily. 10/3/23 11/2/23  Raquel Galvez DO       Allergies:  Cefaclor, Cocoa, and Penicillins    Past Medical History:  He has a past medical history of Allergy to other foods, Essential (primary) hypertension, Personal history of other endocrine, nutritional and metabolic disease, and Personal history of other endocrine, nutritional and metabolic disease.    Past Surgical History:  He has a past surgical history that includes Other surgical history (08/29/2019) and Other surgical history (08/29/2019).    Social History:  He reports that he has never smoked. He has never used smokeless tobacco. He reports current alcohol use of about 1.0 standard drink of alcohol per week. He reports that he does not use drugs.    Objective   Physical exam:  Constitutional: Well developed, well nourished 78 y.o. year old in no acute distress.   Skin: Warm and dry. Normal turgor. No rash, ulcer, trauma, jaundice.   Eyes: Pupils symmetric and reactive to light.  Respiratory: Clear to auscultation bilaterally. No wheezes, rhonchi, or rales heard.  Cardiovascular: Regular rate and rhythm. S1 and S2 appreciated and normal. No murmur, rub, or gallop heard.   Edema: No edema noted.  GI: Normal bowel sounds. Soft, non-distended, non-tender. No rebound or guarding present. No hepatomegaly or splenomegaly appreciated. Abdominal aorta not palpably abnormal.  Musculoskeletal: Limbs and Joints grossly normal. Full range of motion in major joint.   Neuro: Alert and oriented x 3. Cranial nerves  "2-12 grossly intact.   Psych: Normal mood and affect.        Relevant Results Recent labs reviewed in the EMR.  Lab Results   Component Value Date    HGB 12.4 (L) 03/03/2023    HGB 12.2 (L) 09/21/2022    HGB 12.7 (L) 03/21/2022    MCV 93 03/03/2023    MCV 92 09/21/2022    MCV 97 03/21/2022     03/03/2023     09/21/2022     03/21/2022       No results found for: \"FERRITIN\", \"IRON\"    Lab Results   Component Value Date     09/28/2023    K 4.6 09/28/2023     09/28/2023    BUN 19 09/28/2023    CREATININE 0.95 09/28/2023       Lab Results   Component Value Date    BILITOT 0.7 09/28/2023     Lab Results   Component Value Date    ALT 19 09/28/2023    ALT 18 04/07/2023    ALT 17 03/03/2023    AST 20 09/28/2023    AST 15 04/07/2023    AST 18 03/03/2023    ALKPHOS 58 09/28/2023    ALKPHOS 60 04/07/2023    ALKPHOS 47 03/03/2023       Lab Results   Component Value Date    CRP 8.28 (A) 05/29/2020       No results found for: \"CALPS\"    Radiology: Reviewed imaging reviewed in the EMR.  No results found.    Assessment/Plan   Problem List Items Addressed This Visit             ICD-10-CM       Hematology and Neoplasia    Positive colorectal cancer screening using Cologuard test - Primary R19.5     Patient with recent positive cologuard. No symptoms or family history. Discussed with patient, he is agreeable to colonoscopy. He requests Dr. Walden. Orders placed for endo. BP was high today initially, but repeat was much improved with systolic in the 130s.         Relevant Medications    Golytely 236-22.74-6.74 -5.86 gram solution    Other Relevant Orders    Colonoscopy Diagnostic         Sandy Bentley PA-C    "

## 2023-11-20 NOTE — PATIENT INSTRUCTIONS
Thank you for coming in regarding a colonoscopy. Because of the positive cologuard, we recommend a colonoscopy to check for colon cancer or polyps. Please let us know if you have any questions about the preparation or procedure. Your laxative has been sent to your pharmacy.    Call [phone] with questions or concerns.    Please let your PCP know of the elevated BP here in office.

## 2023-11-20 NOTE — ASSESSMENT & PLAN NOTE
Patient with recent positive cologuard. No symptoms or family history. Discussed with patient, he is agreeable to colonoscopy. He requests Dr. Walden. Orders placed for endo. BP was high today initially, but repeat was much improved with systolic in the 130s.

## 2023-11-22 ENCOUNTER — OFFICE VISIT (OUTPATIENT)
Dept: DERMATOLOGY | Facility: CLINIC | Age: 78
End: 2023-11-22
Payer: COMMERCIAL

## 2023-11-22 DIAGNOSIS — L82.1 SEBORRHEIC KERATOSIS: ICD-10-CM

## 2023-11-22 DIAGNOSIS — L57.0 ACTINIC KERATOSIS: Primary | ICD-10-CM

## 2023-11-22 DIAGNOSIS — B35.3 TINEA PEDIS OF RIGHT FOOT: ICD-10-CM

## 2023-11-22 DIAGNOSIS — Z85.828 HISTORY OF NONMELANOMA SKIN CANCER: ICD-10-CM

## 2023-11-22 DIAGNOSIS — L57.8 DIFFUSE PHOTODAMAGE OF SKIN: ICD-10-CM

## 2023-11-22 DIAGNOSIS — D22.5 MELANOCYTIC NEVUS OF TRUNK: ICD-10-CM

## 2023-11-22 DIAGNOSIS — B35.3 TINEA PEDIS OF LEFT FOOT: ICD-10-CM

## 2023-11-22 PROCEDURE — 17004 DESTROY PREMAL LESIONS 15/>: CPT | Performed by: DERMATOLOGY

## 2023-11-22 PROCEDURE — 1159F MED LIST DOCD IN RCRD: CPT | Performed by: DERMATOLOGY

## 2023-11-22 PROCEDURE — 99214 OFFICE O/P EST MOD 30 MIN: CPT | Performed by: DERMATOLOGY

## 2023-11-22 PROCEDURE — 1160F RVW MEDS BY RX/DR IN RCRD: CPT | Performed by: DERMATOLOGY

## 2023-11-22 PROCEDURE — 1036F TOBACCO NON-USER: CPT | Performed by: DERMATOLOGY

## 2023-11-22 RX ORDER — ECONAZOLE NITRATE 10 MG/G
CREAM TOPICAL 2 TIMES DAILY
Qty: 85 G | Refills: 11 | Status: SHIPPED | OUTPATIENT
Start: 2023-11-22 | End: 2024-11-21

## 2023-11-22 ASSESSMENT — DERMATOLOGY PATIENT ASSESSMENT
DO YOU USE A TANNING BED: NO
DO YOU HAVE ANY NEW OR CHANGING LESIONS: NO

## 2023-11-22 ASSESSMENT — DERMATOLOGY QUALITY OF LIFE (QOL) ASSESSMENT: ARE THERE EXCLUSIONS OR EXCEPTIONS FOR THE QUALITY OF LIFE ASSESSMENT: NO

## 2023-11-22 NOTE — PROGRESS NOTES
Subjective     Tomas Morataya is a 78 y.o. male who presents for the following: Skin Exam.  He denies any new, changing, or concerning skin lesions since his last visit; no bleeding, itching, or burning lesions.      Review of Systems:  No other skin or systemic complaints other than what is documented elsewhere in the note.    The following portions of the chart were reviewed this encounter and updated as appropriate:       Skin Cancer History  No skin cancer on file.    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 Gordon on 3/27/19  - AKs   - stasis dermatitis with early lipodermatosclerosis   - Tinea pedis  - no h/o melanoma    Family History:    No family history of melanoma or skin cancer    Social History:    The patient works as the  for local [employer]    Allergies:  Cefaclor, Cocoa, Cephalosporins, and Penicillins    Current Medications / CAM's:    Current Outpatient Medications:     amLODIPine (Norvasc) 10 mg tablet, Take 1 tablet (10 mg) by mouth once daily., Disp: 90 tablet, Rfl: 1    aspirin 81 mg EC tablet, Take 1 tablet (81 mg) by mouth once daily., Disp: , Rfl:     atorvastatin (Lipitor) 40 mg tablet, Take 1 tablet (40 mg) by mouth once daily., Disp: 90 tablet, Rfl: 1    blood sugar diagnostic strip, One touch ultra test strips Test daily Dx: .00, Disp: , Rfl:     cyanocobalamin, vitamin B-12, (Vitamin B-12) 1,000 mcg tablet extended release, Take 1 tablet (1,000 mcg) by mouth once daily., Disp: , Rfl:     doxazosin (Cardura) 4 mg tablet, TAKE 1 TABLET DAILY, Disp: 90 tablet, Rfl: 3    econazole nitrate 1 % cream, Apply topically 2  times a day as needed. Apply to affected, Disp: , Rfl:     econazole nitrate 1 % cream, Apply topically 2 times a day. To affected areas of feet for 3-4 weeks; repeat as needed for flares, Disp: 85 g, Rfl: 11    folic acid (Folvite) 1 mg tablet, Take 1 tablet (1 mg) by mouth once daily., Disp: , Rfl:     hydroCHLOROthiazide (HYDRODiuril) 25 mg tablet, Take 1 tablet (25 mg) by mouth once daily., Disp: 90 tablet, Rfl: 0    hydroCHLOROthiazide (HYDRODiuril) 25 mg tablet, Take 1 tablet (25 mg) by mouth once daily., Disp: 90 tablet, Rfl: 1    hydroCHLOROthiazide (HYDRODiuril) 25 mg tablet, Take 1 tablet (25 mg) by mouth once daily., Disp: 30 tablet, Rfl: 0    loratadine (Claritin) 10 mg tablet, Take 1 tablet (10 mg) by mouth once daily., Disp: , Rfl:     losartan (Cozaar) 100 mg tablet, Take 1 tablet (100 mg) by mouth once daily., Disp: 90 tablet, Rfl: 0    metFORMIN (Glucophage) 1,000 mg tablet, Take 1 tablet (1,000 mg) by mouth 2 times a day., Disp: 180 tablet, Rfl: 1    multivitamin tablet, Take by mouth., Disp: , Rfl:     nystatin (Mycostatin) 100,000 unit/gram powder, Apply topically 2 times a day. Until rash cleared, Disp: , Rfl:     pioglitazone (Actos) 15 mg tablet, Take 1 tablet (15 mg) by mouth once daily., Disp: 90 tablet, Rfl: 1    sildenafil (Viagra) 50 mg tablet, Take 2 tablets (100 mg) by mouth if needed. Before intercourse, Disp: , Rfl:     spironolactone (Aldactone) 25 mg tablet, TAKE 1 TABLET DAILY, Disp: 90 tablet, Rfl: 3    triamcinolone (Kenalog) 0.1 % cream, Apply topically 2 times a day as needed., Disp: , Rfl:     triamcinolone (Nasacort) 55 mcg nasal inhaler, Administer 1-2 sprays into each nostril once daily., Disp: , Rfl:      Objective   Well appearing patient in no apparent distress; mood and affect are within normal limits.    A full examination was performed including scalp, face, eyes, ears, nose, lips, neck, chest, axillae, abdomen, back, bilateral upper extremities, and bilateral lower  extremities. All findings within normal limits unless otherwise noted below.    Assessment/Plan   1. Actinic keratosis (16)  Scalp (16)  Scattered on the patient's scalp, there are multiple erythematous, gritty, scaly macules     Actinic Keratoses -scattered on scalp.  The pre-cancerous nature of these lesions and treatment options were discussed with the patient today.  At this time, I recommend treatment with liquid nitrogen cryotherapy.  The patient expressed understanding, is in agreement with this plan, and wishes to proceed with cryotherapy today.    Destr of lesion - Scalp  Complexity: simple    Destruction method: cryotherapy    Informed consent: discussed and consent obtained    Lesion destroyed using liquid nitrogen: Yes    Cryotherapy cycles:  1  Outcome: patient tolerated procedure well with no complications    Post-procedure details: wound care instructions given      2. Melanocytic nevus of trunk  Scattered on the patient's face, neck, trunk, and extremities, there are multiple small, round- to oval-shaped, brown-pigmented and pink-colored, symmetric, uniform-appearing macules and dome-shaped papules    Clinically benign- to slightly atypical-appearing nevi - the clinically benign- to slightly atypical-appearing nature of the patient's nevi was discussed with the patient today.  None of the patient's nevi meet threshold for biopsy today.  I emphasized the importance of performing monthly self-skin exams using the ABCDs of monitoring moles, which were reviewed with the patient today and an informational hand-out provided.  I also emphasized the importance of sun avoidance and sun protection with daily sunscreen use.  The patient expressed understanding and is in agreement with this plan.    3. Seborrheic keratosis  Scattered on the patient's face, neck, trunk, and extremities, there are multiple tan- to light brown-colored, hyperkeratotic, stuck-on appearing papules of varying size and  shape    Seborrheic Keratoses - the benign nature of these lesions was discussed with the patient today and reassurance provided.  No treatment is medically indicated for these lesions at this time.    4. Tinea pedis of right foot  Right Foot - Anterior  On the patient's bilateral feet, there is moist scaling with maceration and fissures in the lateral webspaces and erythematous, scaly, thin plaques in a moccasin-type distribution on the soles and heels.    Tinea Pedis - bilateral feet.  The fungal nature of this condition and treatment options were discussed extensively with the patient today.  At this time, I recommend topical anti-fungal therapy with Econazole 1% cream, which the patient was instructed to apply twice daily to the affected areas of the feet for the next 3-4 weeks; the patient may repeat treatment in a similar fashion as needed for future flares.  The risks, benefits, and side effects of this medication were discussed.  The patient expressed understanding and is in agreement with this plan.    5. Tinea pedis of left foot  Left Foot - Anterior  On the patient's bilateral feet, there is moist scaling with maceration and fissures in the lateral webspaces and erythematous, scaly, thin plaques in a moccasin-type distribution on the soles and heels.    Tinea Pedis - flare on bilateral feet.  The fungal nature of this condition and treatment options were discussed extensively with the patient today.  At this time, I recommend topical anti-fungal therapy with Econazole 1% cream, which the patient was instructed to apply twice daily to the affected areas of the feet for the next 3-4 weeks; the patient may repeat treatment in a similar fashion as needed for future flares.  The risks, benefits, and side effects of this medication were discussed.  The patient expressed understanding and is in agreement with this plan.    econazole nitrate 1 % cream - Left Foot - Anterior  Apply topically 2 times a day. To  affected areas of feet for 3-4 weeks; repeat as needed for flares    6. History of nonmelanoma skin cancer  Right Ear  On the patient's right ear helix and left ear mid posterior helix, there are well-healed scars with no evidence of recurrent growth on exam today.    History of squamous cell carcinomas and actinic keratoses and photodamage.  There is no evidence of recurrence at the sites of the patient's previously treated SCCs on exam today.  The signs and symptoms of skin cancer were reviewed and the patient was advised to practice sun protection and sun avoidance, use daily sunscreen, and perform regular self skin exams.  I will have the patient return to our office in 4 to 6 months for routine follow-up and skin exam, and the patient was instructed to call our office should the patient notice any new, changing, symptomatic, or otherwise concerning skin lesions before then.  The patient expressed understanding and is in agreement with this plan.    7. Diffuse photodamage of skin  Photodistributed  Diffuse photodamage with actinic changes with telangiectasia and mottled pigmentation in sun-exposed areas.    Photodamage.  The signs and symptoms of skin cancer were reviewed and the patient was advised to practice sun protection and sun avoidance, use daily sunscreen, and perform regular self skin exams.  Sun protection was discussed, including avoiding the mid-day sun, wearing a sunscreen with SPF at least 50, and stressing the need for reapplication of sunscreen and applying more than they think they need.

## 2024-01-11 ENCOUNTER — APPOINTMENT (OUTPATIENT)
Dept: GASTROENTEROLOGY | Facility: HOSPITAL | Age: 79
End: 2024-01-11
Payer: COMMERCIAL

## 2024-01-11 DIAGNOSIS — I10 ESSENTIAL HYPERTENSION: ICD-10-CM

## 2024-01-12 RX ORDER — LOSARTAN POTASSIUM 100 MG/1
100 TABLET ORAL DAILY
Qty: 90 TABLET | Refills: 3 | OUTPATIENT
Start: 2024-01-12

## 2024-01-24 DIAGNOSIS — I10 ESSENTIAL HYPERTENSION: ICD-10-CM

## 2024-01-24 RX ORDER — LOSARTAN POTASSIUM 100 MG/1
100 TABLET ORAL DAILY
Qty: 90 TABLET | Refills: 0 | Status: SHIPPED | OUTPATIENT
Start: 2024-01-24 | End: 2024-04-04 | Stop reason: SDUPTHER

## 2024-01-24 NOTE — TELEPHONE ENCOUNTER
Pt called rx line at 135p requesting pended med    Next OV 4/4  Pt compliant  Pt uses [facility] Thx

## 2024-01-30 ENCOUNTER — ANESTHESIA EVENT (OUTPATIENT)
Dept: GASTROENTEROLOGY | Facility: HOSPITAL | Age: 79
End: 2024-01-30
Payer: COMMERCIAL

## 2024-01-31 ENCOUNTER — ANESTHESIA (OUTPATIENT)
Dept: GASTROENTEROLOGY | Facility: HOSPITAL | Age: 79
End: 2024-01-31
Payer: COMMERCIAL

## 2024-01-31 ENCOUNTER — HOSPITAL ENCOUNTER (OUTPATIENT)
Dept: GASTROENTEROLOGY | Facility: HOSPITAL | Age: 79
Discharge: HOME | End: 2024-01-31
Payer: COMMERCIAL

## 2024-01-31 VITALS
SYSTOLIC BLOOD PRESSURE: 127 MMHG | OXYGEN SATURATION: 97 % | RESPIRATION RATE: 16 BRPM | HEART RATE: 76 BPM | TEMPERATURE: 97.4 F | DIASTOLIC BLOOD PRESSURE: 67 MMHG

## 2024-01-31 DIAGNOSIS — R19.5 POSITIVE COLORECTAL CANCER SCREENING USING COLOGUARD TEST: ICD-10-CM

## 2024-01-31 PROBLEM — H54.7 VISION LOSS: Status: ACTIVE | Noted: 2024-01-31

## 2024-01-31 PROCEDURE — 2500000005 HC RX 250 GENERAL PHARMACY W/O HCPCS: Performed by: NURSE ANESTHETIST, CERTIFIED REGISTERED

## 2024-01-31 PROCEDURE — 45385 COLONOSCOPY W/LESION REMOVAL: CPT | Performed by: INTERNAL MEDICINE

## 2024-01-31 PROCEDURE — 2500000004 HC RX 250 GENERAL PHARMACY W/ HCPCS (ALT 636 FOR OP/ED): Performed by: NURSE ANESTHETIST, CERTIFIED REGISTERED

## 2024-01-31 PROCEDURE — 3700000002 HC GENERAL ANESTHESIA TIME - EACH INCREMENTAL 1 MINUTE

## 2024-01-31 PROCEDURE — 0753T DGTZ GLS MCRSCP SLD LEVEL IV: CPT | Mod: TC,SUR,PORLAB | Performed by: INTERNAL MEDICINE

## 2024-01-31 PROCEDURE — 88305 TISSUE EXAM BY PATHOLOGIST: CPT | Performed by: PATHOLOGY

## 2024-01-31 PROCEDURE — 7100000010 HC PHASE TWO TIME - EACH INCREMENTAL 1 MINUTE

## 2024-01-31 PROCEDURE — 2500000004 HC RX 250 GENERAL PHARMACY W/ HCPCS (ALT 636 FOR OP/ED): Performed by: INTERNAL MEDICINE

## 2024-01-31 PROCEDURE — 7100000009 HC PHASE TWO TIME - INITIAL BASE CHARGE

## 2024-01-31 PROCEDURE — 3700000001 HC GENERAL ANESTHESIA TIME - INITIAL BASE CHARGE

## 2024-01-31 RX ORDER — LIDOCAINE HYDROCHLORIDE 20 MG/ML
INJECTION, SOLUTION EPIDURAL; INFILTRATION; INTRACAUDAL; PERINEURAL AS NEEDED
Status: DISCONTINUED | OUTPATIENT
Start: 2024-01-31 | End: 2024-01-31

## 2024-01-31 RX ORDER — PROPOFOL 10 MG/ML
INJECTION, EMULSION INTRAVENOUS AS NEEDED
Status: DISCONTINUED | OUTPATIENT
Start: 2024-01-31 | End: 2024-01-31

## 2024-01-31 RX ORDER — SODIUM CHLORIDE 9 MG/ML
30 INJECTION, SOLUTION INTRAVENOUS CONTINUOUS
Status: DISCONTINUED | OUTPATIENT
Start: 2024-01-31 | End: 2024-02-01 | Stop reason: HOSPADM

## 2024-01-31 RX ORDER — FENTANYL CITRATE 50 UG/ML
INJECTION, SOLUTION INTRAMUSCULAR; INTRAVENOUS AS NEEDED
Status: DISCONTINUED | OUTPATIENT
Start: 2024-01-31 | End: 2024-01-31

## 2024-01-31 RX ADMIN — PROPOFOL 50 MG: 10 INJECTION, EMULSION INTRAVENOUS at 09:56

## 2024-01-31 RX ADMIN — SODIUM CHLORIDE: 9 INJECTION, SOLUTION INTRAVENOUS at 09:48

## 2024-01-31 RX ADMIN — PROPOFOL 30 MG: 10 INJECTION, EMULSION INTRAVENOUS at 10:06

## 2024-01-31 RX ADMIN — FENTANYL CITRATE 25 MCG: 50 INJECTION, SOLUTION INTRAMUSCULAR; INTRAVENOUS at 10:02

## 2024-01-31 RX ADMIN — FENTANYL CITRATE 25 MCG: 50 INJECTION, SOLUTION INTRAMUSCULAR; INTRAVENOUS at 09:56

## 2024-01-31 RX ADMIN — PROPOFOL 100 MG: 10 INJECTION, EMULSION INTRAVENOUS at 09:53

## 2024-01-31 RX ADMIN — PROPOFOL 30 MG: 10 INJECTION, EMULSION INTRAVENOUS at 10:01

## 2024-01-31 RX ADMIN — FENTANYL CITRATE 50 MCG: 50 INJECTION, SOLUTION INTRAMUSCULAR; INTRAVENOUS at 09:53

## 2024-01-31 RX ADMIN — LIDOCAINE HYDROCHLORIDE 40 MG: 20 INJECTION, SOLUTION EPIDURAL; INFILTRATION; INTRACAUDAL; PERINEURAL at 09:53

## 2024-01-31 SDOH — HEALTH STABILITY: MENTAL HEALTH: CURRENT SMOKER: 0

## 2024-01-31 ASSESSMENT — COLUMBIA-SUICIDE SEVERITY RATING SCALE - C-SSRS
2. HAVE YOU ACTUALLY HAD ANY THOUGHTS OF KILLING YOURSELF?: NO
1. IN THE PAST MONTH, HAVE YOU WISHED YOU WERE DEAD OR WISHED YOU COULD GO TO SLEEP AND NOT WAKE UP?: NO
6. HAVE YOU EVER DONE ANYTHING, STARTED TO DO ANYTHING, OR PREPARED TO DO ANYTHING TO END YOUR LIFE?: NO

## 2024-01-31 ASSESSMENT — PAIN DESCRIPTION - DESCRIPTORS: DESCRIPTORS: ACHING

## 2024-01-31 ASSESSMENT — PAIN SCALES - GENERAL
PAINLEVEL_OUTOF10: 0 - NO PAIN
PAIN_LEVEL: 0
PAINLEVEL_OUTOF10: 0 - NO PAIN
PAINLEVEL_OUTOF10: 3

## 2024-01-31 ASSESSMENT — PAIN - FUNCTIONAL ASSESSMENT
PAIN_FUNCTIONAL_ASSESSMENT: 0-10

## 2024-01-31 NOTE — H&P
[facility] for [employer]. History Of Present Illness  Tomas Morataya is a 78 y.o. male presenting for colon cancer screening examination.  Recent stool Cologuard test was positive.  He had a remote colonoscopy examination about 10 years ago at which time he said a few polyps were found and removed..     Past Medical History  He has a past medical history of Allergy to other foods, Essential (primary) hypertension, Personal history of other endocrine, nutritional and metabolic disease, and Personal history of other endocrine, nutritional and metabolic disease.    Surgical History  He has a past surgical history that includes Other surgical history (08/29/2019) and Other surgical history (08/29/2019).     Social History  He reports that he has never smoked. He has never used smokeless tobacco. He reports current alcohol use of about 1.0 standard drink of alcohol per week. He reports that he does not use drugs.    Family History  Family History   Problem Relation Name Age of Onset    Coronary artery disease Mother      Hypertension Mother      Breast cancer Mother      Diabetes type II Mother          Allergies  Cefaclor, Cocoa, Cephalosporins, and Penicillins    Review of Systems  Constitutional: no fever, no chills, fatigue,  not feeling tired and no recent weight loss. No reports of dizziness.  SKIN: No skin rash or lesions  EYE: No pain photophobia, diplopia or blurred vision  EAR: No discharge, pain or hearing loss  NOSE: No congestion, epistaxis or obstruction  RESPIRATORY: No cough , dyspnea or  chest pain   CV: No chest pain, lower extremity swelling or palpitations  GE: No abdominal pain, nausea, vomiting, dysphagia or odynophagia. Denied constipation , diarrhea  : No dysuria or hematuria, urinary incontinence or urine frequency  NEURO: Denied weakness, confusion, dizziness, or numbness   PSYCH : Denies anxiety, hallucinations or insomnia  HEME/ LYMPH : Denied bleeding , bruising or enlarged nodes  ENDOCRINE: No change in weight,  polydipsia, or abnormal bleeding  .        Physical Exam  Head and neck examinations were  unremarkable. There was no temporal wasting. No jaundice noted. No thyromegaly.  No carotid bruits.  There is no peripheral lymphadenopathy.  Lungs were clear to auscultation. There when no rales, rhonchi or wheezes.  Cardiac examination revealed normal heart sounds. Rhythm was sinus . There were no murmurs.  Abdomen was full and soft. There was no organomegaly. Bowel sounds were normo- active. There was no ascites. The abdomen was nontender.  Rectal examination was not done.  Extremities: No edema or joint swelling.  Neurological examination: Patient was alert, oriented in person place, and time. There when no focal neurological signs. Cranial nerves were grossly intact. No evidence of encephalopathy. There was no asterixis. The  plantar response was flexor.    Last Recorded Vitals  Blood pressure 126/75, pulse 98, temperature 36.6 °C (97.9 °F), resp. rate 18, SpO2 97 %.    Relevant Results        None     Assessment/Plan  78-year-old man referred for colon cancer screening examination.  Recent stool Cologuard test was positive.  2 polyps were removed about 10 years ago at colonoscopy.  Proceed with colonoscopy today.    Carlos Walden MD

## 2024-01-31 NOTE — ANESTHESIA POSTPROCEDURE EVALUATION
Patient: Tomas Morataya    Procedure Summary       Date: 01/31/24 Room / Location: [facility]    Anesthesia Start: 0948 Anesthesia Stop: 1021    Procedure: COLONOSCOPY Diagnosis: Positive colorectal cancer screening using Cologuard test    Scheduled Providers: Carlos Walden MD Responsible Provider: ALAN Jade    Anesthesia Type: MAC ASA Status: 3            Anesthesia Type: MAC    Vitals Value Taken Time   BP 99/60 01/31/24 1021   Temp 36.7 °C (98.1 °F) 01/31/24 1021   Pulse 79 01/31/24 1021   Resp 16 01/31/24 1021   SpO2 93 % 01/31/24 1021       Anesthesia Post Evaluation    Patient location during evaluation: bedside  Patient participation: complete - patient participated  Level of consciousness: awake and alert  Pain score: 0  Pain management: adequate  Airway patency: patent  Cardiovascular status: acceptable  Respiratory status: acceptable  Hydration status: acceptable  Postoperative Nausea and Vomiting: none    There were no known notable events for this encounter.

## 2024-01-31 NOTE — ANESTHESIA PREPROCEDURE EVALUATION
Patient: Tomas Morataya    Procedure Information       Date/Time: 01/31/24 2691    Scheduled providers: Carlos Walden MD    Procedure: COLONOSCOPY    Location: [facility]            Relevant Problems   Anesthesia (within normal limits)      Cardiovascular   (+) Benign essential hypertension   (+) Coronary arteriosclerosis   (+) Essential hypertension   (+) Hyperlipidemia   (+) PVD (peripheral vascular disease) (CMS/HCC)      Endocrine   (+) Morbid obesity (CMS/HCC)   (+) Type 2 diabetes mellitus (CMS/HCC)      GI (within normal limits)      /Renal (within normal limits)      Pulmonary   (+) Obstructive sleep apnea      GI/Hepatic (within normal limits)      Hematology   (+) Anemia      Eyes, Ears, Nose, and Throat   (+) Vision loss      Other   (+) Arthritis of right knee       Clinical information reviewed:    Allergies                NPO Detail:  No data recorded     Physical Exam    Airway  Mallampati: III  TM distance: >3 FB  Neck ROM: full  Comments: Thick neck   Cardiovascular - normal exam  Rhythm: regular     Dental    Pulmonary - normal exam     Abdominal - normal exam         Anesthesia Plan    History of general anesthesia?: yes  History of complications of general anesthesia?: no    ASA 3     MAC     The patient is not a current smoker.  Patient was not previously instructed to abstain from smoking on day of procedure.  Patient did not smoke on day of procedure.    intravenous induction   Anesthetic plan and risks discussed with patient.

## 2024-02-07 LAB
LABORATORY COMMENT REPORT: NORMAL
PATH REPORT.FINAL DX SPEC: NORMAL
PATH REPORT.GROSS SPEC: NORMAL
PATH REPORT.RELEVANT HX SPEC: NORMAL
PATH REPORT.TOTAL CANCER: NORMAL

## 2024-02-20 ENCOUNTER — OFFICE VISIT (OUTPATIENT)
Dept: ORTHOPEDIC SURGERY | Facility: CLINIC | Age: 79
End: 2024-02-20
Payer: COMMERCIAL

## 2024-02-20 ENCOUNTER — HOSPITAL ENCOUNTER (OUTPATIENT)
Dept: RADIOLOGY | Facility: HOSPITAL | Age: 79
Discharge: HOME | End: 2024-02-20
Payer: COMMERCIAL

## 2024-02-20 DIAGNOSIS — M17.11 ARTHRITIS OF RIGHT KNEE: ICD-10-CM

## 2024-02-20 DIAGNOSIS — M17.11 ARTHRITIS OF RIGHT KNEE: Primary | ICD-10-CM

## 2024-02-20 PROCEDURE — 1036F TOBACCO NON-USER: CPT | Performed by: ORTHOPAEDIC SURGERY

## 2024-02-20 PROCEDURE — 1126F AMNT PAIN NOTED NONE PRSNT: CPT | Performed by: ORTHOPAEDIC SURGERY

## 2024-02-20 PROCEDURE — 99214 OFFICE O/P EST MOD 30 MIN: CPT | Performed by: ORTHOPAEDIC SURGERY

## 2024-02-20 PROCEDURE — 1160F RVW MEDS BY RX/DR IN RCRD: CPT | Performed by: ORTHOPAEDIC SURGERY

## 2024-02-20 PROCEDURE — 73564 X-RAY EXAM KNEE 4 OR MORE: CPT | Mod: RT

## 2024-02-20 PROCEDURE — 73564 X-RAY EXAM KNEE 4 OR MORE: CPT | Mod: RIGHT SIDE | Performed by: STUDENT IN AN ORGANIZED HEALTH CARE EDUCATION/TRAINING PROGRAM

## 2024-02-20 PROCEDURE — 1159F MED LIST DOCD IN RCRD: CPT | Performed by: ORTHOPAEDIC SURGERY

## 2024-02-20 NOTE — PROGRESS NOTES
This is a consultation from Dr. Raquel Galvez DO for   Chief Complaint   Patient presents with    Right Knee - Pain       This is a 79 y.o. male who presents for follow-up for his bilateral knees.  Patient has bilateral knee arthritis, I have seen him but not for couple years.  This is a chronic issue for but is been exacerbated over the last 1 to 2 weeks.  Sharp stabbing pain over the medial knee worse with walking close rest.  He has been taking over-the-counter anti-inflammatories.  He is not using a cane or walker.  Has not had any recent injections and never had surgery.  He does note that he has been trying to adjust his diet to decrease inflammation    Physical Exam    There has been no interval change in this patient's past medical, surgical, medications, allergies, family history or social history since the most recent visit to a provider within our department. 14 point review of systems was performed, reviewed, and negative except for pertinent positives documented in the history of present illness.     Constitutional: well developed, well nourished male in no acute distress  Psychiatric: normal mood, appropriate affect  Eyes: sclera anicteric  HENT: normocephalic/atraumatic  CV: regular rate and rhythm   Respiratory: non labored breathing  Integumentary: no rash  Neurological: moves all extremities    Bilateral knee exam: skin intact no lacerations or abrations.  1+ effusion.  Tender medial joint line. negative log roll negative patellar grind. ROM 0-120. stable to varus and valgus stress at 0 and 30 degrees. negative lachman negative posterior drawer negative jerry. 5/5 ehl/fhl/gs/ta. silt s/s/sp/dp/t. 2+ dp/pt        Xrays were ordered by me, they were reviewed and independently interpreted by me today, they show severe degenerative disease bone-on-bone arthritis    Procedures      Impression/Plan: This is a 79 y.o. male with severe bilateral knee arthritis.  I had an in depth discussion with the  "patient regarding treatment options for arthritis and their relative risks and benefits. We reviewed surgical and nonsurgical option for treatment. Treatments include anti inflammatory medications, physical therapy, weight loss, activity modification, use of assistive devices, injection therapies. We discussed current prescriptions and risks and benefits of continuation of prescription medication as apporpriate. We discussed that arthritis is often progressive over time, an in end stage arthritis surgical interventions can be considered, including arthroplasty. All questions were answered and the patient voiced their understanding.  Defers injection for today.  I will see him back as needed    BMI Readings from Last 1 Encounters:   11/20/23 41.97 kg/m²      Lab Results   Component Value Date    CREATININE 0.95 09/28/2023     Tobacco Use: Low Risk  (2/20/2024)    Patient History     Smoking Tobacco Use: Never     Smokeless Tobacco Use: Never     Passive Exposure: Not on file      MELD 3.0: 7 at 3/3/2023 11:30 AM  MELD-Na: 6 at 3/3/2023 11:30 AM  Calculated from:  Serum Creatinine: 0.96 mg/dL (Using min of 1 mg/dL) at 3/3/2023 11:30 AM  Serum Sodium: 136 mmol/L at 3/3/2023 11:30 AM  Total Bilirubin: 0.5 mg/dL (Using min of 1 mg/dL) at 3/3/2023 11:30 AM  Serum Albumin: 3.6 g/dL (Using max of 3.5 g/dL) at 3/3/2023 11:30 AM  INR(ratio): 0.9 (Using min of 1) at 3/3/2023 11:30 AM  Age at listing (hypothetical): 78 years  Sex: Male at 3/3/2023 11:30 AM       Lab Results   Component Value Date    HGBA1C 7.1 (A) 09/28/2023     No results found for: \"STAPHMRSASCR\"  "

## 2024-02-22 ENCOUNTER — APPOINTMENT (OUTPATIENT)
Dept: UROLOGY | Facility: HOSPITAL | Age: 79
End: 2024-02-22
Payer: COMMERCIAL

## 2024-03-07 DIAGNOSIS — N40.0 BENIGN PROSTATIC HYPERPLASIA, UNSPECIFIED WHETHER LOWER URINARY TRACT SYMPTOMS PRESENT: ICD-10-CM

## 2024-03-07 DIAGNOSIS — I10 HYPERTENSION, UNSPECIFIED TYPE: ICD-10-CM

## 2024-03-07 DIAGNOSIS — I25.10 CORONARY ARTERIOSCLEROSIS: ICD-10-CM

## 2024-03-07 DIAGNOSIS — E78.5 HYPERLIPIDEMIA, UNSPECIFIED HYPERLIPIDEMIA TYPE: ICD-10-CM

## 2024-03-07 DIAGNOSIS — I10 ESSENTIAL HYPERTENSION: ICD-10-CM

## 2024-03-07 DIAGNOSIS — E11.69 TYPE 2 DIABETES MELLITUS WITH OTHER SPECIFIED COMPLICATION, WITHOUT LONG-TERM CURRENT USE OF INSULIN (MULTI): ICD-10-CM

## 2024-03-07 NOTE — TELEPHONE ENCOUNTER
Pt called Rx line asking for a refill on atorvastatin, pt would like this to be printed so he can take it to a pharmacy. Med won't come in from [facility] before 3/17. Please advise, AM

## 2024-03-08 DIAGNOSIS — I25.10 CORONARY ARTERIOSCLEROSIS: ICD-10-CM

## 2024-03-08 DIAGNOSIS — E78.5 HYPERLIPIDEMIA, UNSPECIFIED HYPERLIPIDEMIA TYPE: ICD-10-CM

## 2024-03-08 DIAGNOSIS — I10 ESSENTIAL HYPERTENSION: ICD-10-CM

## 2024-03-08 DIAGNOSIS — E11.69 TYPE 2 DIABETES MELLITUS WITH OTHER SPECIFIED COMPLICATION, WITHOUT LONG-TERM CURRENT USE OF INSULIN (MULTI): ICD-10-CM

## 2024-03-08 RX ORDER — ATORVASTATIN CALCIUM 40 MG/1
40 TABLET, FILM COATED ORAL DAILY
Qty: 90 TABLET | Refills: 3 | OUTPATIENT
Start: 2024-03-08

## 2024-03-08 RX ORDER — SPIRONOLACTONE 25 MG/1
TABLET ORAL
Qty: 90 TABLET | Refills: 3 | OUTPATIENT
Start: 2024-03-08

## 2024-03-08 RX ORDER — ATORVASTATIN CALCIUM 40 MG/1
40 TABLET, FILM COATED ORAL DAILY
Qty: 90 TABLET | Refills: 1 | Status: SHIPPED | OUTPATIENT
Start: 2024-03-08 | End: 2024-04-04 | Stop reason: SDUPTHER

## 2024-03-08 RX ORDER — DOXAZOSIN 4 MG/1
TABLET ORAL
Qty: 90 TABLET | Refills: 3 | OUTPATIENT
Start: 2024-03-08

## 2024-03-15 DIAGNOSIS — I10 ESSENTIAL HYPERTENSION: ICD-10-CM

## 2024-03-15 DIAGNOSIS — I10 HYPERTENSION, UNSPECIFIED TYPE: ICD-10-CM

## 2024-03-15 NOTE — TELEPHONE ENCOUNTER
Pt called in stating he needs a refill on pended medications to mail order. Please advise, AM    Next OV 4/4/24

## 2024-03-26 RX ORDER — HYDROCHLOROTHIAZIDE 25 MG/1
25 TABLET ORAL DAILY
Qty: 90 TABLET | Refills: 0 | Status: SHIPPED | OUTPATIENT
Start: 2024-03-26 | End: 2024-06-24

## 2024-03-26 RX ORDER — SPIRONOLACTONE 25 MG/1
25 TABLET ORAL DAILY
Qty: 90 TABLET | Refills: 0 | Status: SHIPPED | OUTPATIENT
Start: 2024-03-26 | End: 2024-05-22 | Stop reason: SDUPTHER

## 2024-04-01 ENCOUNTER — LAB (OUTPATIENT)
Dept: LAB | Facility: LAB | Age: 79
End: 2024-04-01
Payer: COMMERCIAL

## 2024-04-01 DIAGNOSIS — E78.5 HYPERLIPIDEMIA, UNSPECIFIED HYPERLIPIDEMIA TYPE: ICD-10-CM

## 2024-04-01 DIAGNOSIS — I10 ESSENTIAL HYPERTENSION: ICD-10-CM

## 2024-04-01 DIAGNOSIS — I25.10 CORONARY ARTERIOSCLEROSIS: ICD-10-CM

## 2024-04-01 DIAGNOSIS — E11.69 TYPE 2 DIABETES MELLITUS WITH OTHER SPECIFIED COMPLICATION, WITHOUT LONG-TERM CURRENT USE OF INSULIN (MULTI): ICD-10-CM

## 2024-04-01 DIAGNOSIS — Z12.5 SCREENING FOR PROSTATE CANCER: ICD-10-CM

## 2024-04-01 DIAGNOSIS — D51.9 ANEMIA DUE TO VITAMIN B12 DEFICIENCY, UNSPECIFIED B12 DEFICIENCY TYPE: ICD-10-CM

## 2024-04-01 LAB
ALBUMIN SERPL BCP-MCNC: 3.7 G/DL (ref 3.4–5)
ALP SERPL-CCNC: 52 U/L (ref 33–136)
ALT SERPL W P-5'-P-CCNC: 21 U/L (ref 10–52)
ANION GAP SERPL CALC-SCNC: 11 MMOL/L (ref 10–20)
AST SERPL W P-5'-P-CCNC: 21 U/L (ref 9–39)
BASOPHILS # BLD AUTO: 0.05 X10*3/UL (ref 0–0.1)
BASOPHILS NFR BLD AUTO: 0.6 %
BILIRUB SERPL-MCNC: 0.6 MG/DL (ref 0–1.2)
BUN SERPL-MCNC: 19 MG/DL (ref 6–23)
CALCIUM SERPL-MCNC: 8.9 MG/DL (ref 8.6–10.3)
CHLORIDE SERPL-SCNC: 104 MMOL/L (ref 98–107)
CO2 SERPL-SCNC: 25 MMOL/L (ref 21–32)
CREAT SERPL-MCNC: 0.97 MG/DL (ref 0.5–1.3)
EGFRCR SERPLBLD CKD-EPI 2021: 80 ML/MIN/1.73M*2
EOSINOPHIL # BLD AUTO: 0.19 X10*3/UL (ref 0–0.4)
EOSINOPHIL NFR BLD AUTO: 2.5 %
ERYTHROCYTE [DISTWIDTH] IN BLOOD BY AUTOMATED COUNT: 14.7 % (ref 11.5–14.5)
EST. AVERAGE GLUCOSE BLD GHB EST-MCNC: 171 MG/DL
GLUCOSE SERPL-MCNC: 156 MG/DL (ref 74–99)
HBA1C MFR BLD: 7.6 %
HCT VFR BLD AUTO: 40.3 % (ref 41–52)
HGB BLD-MCNC: 13.2 G/DL (ref 13.5–17.5)
IMM GRANULOCYTES # BLD AUTO: 0.01 X10*3/UL (ref 0–0.5)
IMM GRANULOCYTES NFR BLD AUTO: 0.1 % (ref 0–0.9)
LYMPHOCYTES # BLD AUTO: 2.58 X10*3/UL (ref 0.8–3)
LYMPHOCYTES NFR BLD AUTO: 33.4 %
MCH RBC QN AUTO: 30.4 PG (ref 26–34)
MCHC RBC AUTO-ENTMCNC: 32.8 G/DL (ref 32–36)
MCV RBC AUTO: 93 FL (ref 80–100)
MONOCYTES # BLD AUTO: 0.77 X10*3/UL (ref 0.05–0.8)
MONOCYTES NFR BLD AUTO: 10 %
NEUTROPHILS # BLD AUTO: 4.13 X10*3/UL (ref 1.6–5.5)
NEUTROPHILS NFR BLD AUTO: 53.4 %
NRBC BLD-RTO: 0 /100 WBCS (ref 0–0)
PLATELET # BLD AUTO: 249 X10*3/UL (ref 150–450)
POTASSIUM SERPL-SCNC: 4.5 MMOL/L (ref 3.5–5.3)
PROT SERPL-MCNC: 6.4 G/DL (ref 6.4–8.2)
PSA SERPL-MCNC: 1.71 NG/ML
RBC # BLD AUTO: 4.34 X10*6/UL (ref 4.5–5.9)
SODIUM SERPL-SCNC: 135 MMOL/L (ref 136–145)
VIT B12 SERPL-MCNC: 941 PG/ML (ref 211–911)
WBC # BLD AUTO: 7.7 X10*3/UL (ref 4.4–11.3)

## 2024-04-01 PROCEDURE — 85025 COMPLETE CBC W/AUTO DIFF WBC: CPT

## 2024-04-01 PROCEDURE — 83036 HEMOGLOBIN GLYCOSYLATED A1C: CPT

## 2024-04-01 PROCEDURE — 84153 ASSAY OF PSA TOTAL: CPT

## 2024-04-01 PROCEDURE — 82607 VITAMIN B-12: CPT

## 2024-04-01 PROCEDURE — 36415 COLL VENOUS BLD VENIPUNCTURE: CPT

## 2024-04-01 PROCEDURE — 80053 COMPREHEN METABOLIC PANEL: CPT

## 2024-04-04 ENCOUNTER — OFFICE VISIT (OUTPATIENT)
Dept: PRIMARY CARE | Facility: CLINIC | Age: 79
End: 2024-04-04
Payer: COMMERCIAL

## 2024-04-04 VITALS
TEMPERATURE: 98.2 F | HEART RATE: 97 BPM | SYSTOLIC BLOOD PRESSURE: 126 MMHG | DIASTOLIC BLOOD PRESSURE: 62 MMHG | BODY MASS INDEX: 42.29 KG/M2 | WEIGHT: 262 LBS | OXYGEN SATURATION: 98 %

## 2024-04-04 DIAGNOSIS — E78.5 HYPERLIPIDEMIA, UNSPECIFIED HYPERLIPIDEMIA TYPE: Primary | ICD-10-CM

## 2024-04-04 DIAGNOSIS — E11.69 TYPE 2 DIABETES MELLITUS WITH OTHER SPECIFIED COMPLICATION, WITHOUT LONG-TERM CURRENT USE OF INSULIN (MULTI): ICD-10-CM

## 2024-04-04 DIAGNOSIS — D51.9 ANEMIA DUE TO VITAMIN B12 DEFICIENCY, UNSPECIFIED B12 DEFICIENCY TYPE: ICD-10-CM

## 2024-04-04 DIAGNOSIS — I10 ESSENTIAL HYPERTENSION: ICD-10-CM

## 2024-04-04 DIAGNOSIS — E66.01 MORBID OBESITY (MULTI): ICD-10-CM

## 2024-04-04 DIAGNOSIS — I10 HYPERTENSION, UNSPECIFIED TYPE: ICD-10-CM

## 2024-04-04 DIAGNOSIS — I25.10 CORONARY ARTERIOSCLEROSIS: ICD-10-CM

## 2024-04-04 PROCEDURE — 99214 OFFICE O/P EST MOD 30 MIN: CPT | Performed by: FAMILY MEDICINE

## 2024-04-04 PROCEDURE — 1036F TOBACCO NON-USER: CPT | Performed by: FAMILY MEDICINE

## 2024-04-04 PROCEDURE — 3074F SYST BP LT 130 MM HG: CPT | Performed by: FAMILY MEDICINE

## 2024-04-04 PROCEDURE — 3078F DIAST BP <80 MM HG: CPT | Performed by: FAMILY MEDICINE

## 2024-04-04 PROCEDURE — 1159F MED LIST DOCD IN RCRD: CPT | Performed by: FAMILY MEDICINE

## 2024-04-04 PROCEDURE — 1160F RVW MEDS BY RX/DR IN RCRD: CPT | Performed by: FAMILY MEDICINE

## 2024-04-04 RX ORDER — PIOGLITAZONEHYDROCHLORIDE 15 MG/1
15 TABLET ORAL DAILY
Qty: 90 TABLET | Refills: 1 | Status: SHIPPED | OUTPATIENT
Start: 2024-04-04 | End: 2024-10-01

## 2024-04-04 RX ORDER — METFORMIN HYDROCHLORIDE 1000 MG/1
1000 TABLET ORAL 2 TIMES DAILY
Qty: 180 TABLET | Refills: 1 | Status: SHIPPED | OUTPATIENT
Start: 2024-04-04 | End: 2024-10-01

## 2024-04-04 RX ORDER — AMLODIPINE BESYLATE 10 MG/1
10 TABLET ORAL DAILY
Qty: 90 TABLET | Refills: 1 | Status: SHIPPED | OUTPATIENT
Start: 2024-04-04 | End: 2024-10-01

## 2024-04-04 RX ORDER — ATORVASTATIN CALCIUM 40 MG/1
40 TABLET, FILM COATED ORAL DAILY
Qty: 90 TABLET | Refills: 1 | Status: SHIPPED | OUTPATIENT
Start: 2024-04-04 | End: 2024-05-22 | Stop reason: SDUPTHER

## 2024-04-04 RX ORDER — LOSARTAN POTASSIUM 100 MG/1
100 TABLET ORAL DAILY
Qty: 90 TABLET | Refills: 0 | Status: SHIPPED | OUTPATIENT
Start: 2024-04-04 | End: 2024-07-03

## 2024-04-04 ASSESSMENT — ENCOUNTER SYMPTOMS
SLEEP DISTURBANCE: 0
DIZZINESS: 0
FATIGUE: 0
HYPERTENSION: 1
NAUSEA: 0
DIFFICULTY URINATING: 0
CONSTIPATION: 0
SHORTNESS OF BREATH: 0
HEADACHES: 0
DYSPHORIC MOOD: 0
POLYDIPSIA: 0
BLOOD IN STOOL: 0
DIARRHEA: 0
POLYPHAGIA: 0
VOMITING: 0
PALPITATIONS: 0
ABDOMINAL PAIN: 0
MYALGIAS: 0
DYSURIA: 0

## 2024-04-04 NOTE — PATIENT INSTRUCTIONS
Recommend a predominant low fat whole foods plant based diet.  Cut back on meat, dairy, processed carbs, salt and oils. Increase fiber in your diet.  Decrease alcohol as much as possible if you drink. Recommend regular exercise most days of the week(goal up to 150min per week). Also recommend good sleep habits aiming for 7-8 hours per night.     Continue your current meds    Return in 6 months, sooner if needed

## 2024-04-04 NOTE — PROGRESS NOTES
Subjective   Patient ID: Tomas Morataya is a 79 y.o. male who presents for Hypertension (Recheck ), Hyperlipidemia (Review BW), and Diabetes and multiple issues.     Hypertension  Pertinent negatives include no chest pain, headaches, palpitations or shortness of breath.   Hyperlipidemia  Pertinent negatives include no chest pain, myalgias or shortness of breath.   Diabetes  Pertinent negatives for hypoglycemia include no dizziness or headaches. Pertinent negatives for diabetes include no chest pain, no fatigue, no polydipsia, no polyphagia and no polyuria.      DM: controlled. A1c 7.6(7.1).   Lipids: have been stable.   B12: stable  Anemia: stable. H/H 13.2/40.3. no bleeding.   BPH: stable.   BMI: high. Trying to work on diet and exercise.   CAD: stable. No CP.     Review of Systems   Constitutional:  Negative for fatigue.   Eyes:  Negative for visual disturbance.   Respiratory:  Negative for shortness of breath.    Cardiovascular:  Negative for chest pain and palpitations.   Gastrointestinal:  Negative for abdominal pain, blood in stool, constipation, diarrhea, nausea and vomiting.   Endocrine: Negative for cold intolerance, heat intolerance, polydipsia, polyphagia and polyuria.   Genitourinary:  Negative for difficulty urinating and dysuria.   Musculoskeletal:  Negative for myalgias.   Skin:  Negative for rash.   Neurological:  Negative for dizziness and headaches.   Psychiatric/Behavioral:  Negative for dysphoric mood and sleep disturbance.        Objective   /62   Pulse 97   Temp 36.8 °C (98.2 °F)   Wt 119 kg (262 lb)   SpO2 98%   BMI 42.29 kg/m²     Physical Exam  Vitals and nursing note reviewed.   Constitutional:       General: He is not in acute distress.     Appearance: Normal appearance. He is not toxic-appearing.   HENT:      Head: Normocephalic.   Eyes:      General: No scleral icterus.     Pupils: Pupils are equal, round, and reactive to light.   Neck:      Vascular: No carotid bruit.    Cardiovascular:      Rate and Rhythm: Normal rate and regular rhythm.      Heart sounds: No murmur heard.  Pulmonary:      Effort: Pulmonary effort is normal. No respiratory distress.      Breath sounds: Normal breath sounds.   Abdominal:      Palpations: Abdomen is soft.      Tenderness: There is no abdominal tenderness. There is no guarding.   Musculoskeletal:         General: No tenderness.      Right lower leg: Edema (trace) present.      Left lower leg: Edema (trace) present.   Skin:     General: Skin is warm.   Neurological:      General: No focal deficit present.      Mental Status: He is alert.      Cranial Nerves: No cranial nerve deficit.   Psychiatric:         Mood and Affect: Mood normal.         Assessment/Plan   Problem List Items Addressed This Visit             ICD-10-CM    Hyperlipidemia - Primary E78.5    Relevant Medications    atorvastatin (Lipitor) 40 mg tablet    Other Relevant Orders    Comprehensive Metabolic Panel    Lipid Panel    Type 2 diabetes mellitus (CMS/formerly [facility]) E11.9    Relevant Medications    pioglitazone (Actos) 15 mg tablet    metFORMIN (Glucophage) 1,000 mg tablet    atorvastatin (Lipitor) 40 mg tablet    Other Relevant Orders    Comprehensive Metabolic Panel    Hemoglobin A1C    Essential hypertension I10    Relevant Medications    losartan (Cozaar) 100 mg tablet    atorvastatin (Lipitor) 40 mg tablet    Other Relevant Orders    Comprehensive Metabolic Panel    Anemia D64.9    Relevant Orders    Vitamin B12    CBC and Auto Differential    Morbid obesity (CMS/formerly [facility]) E66.01    Coronary arteriosclerosis I25.10    Relevant Medications    amLODIPine (Norvasc) 10 mg tablet    atorvastatin (Lipitor) 40 mg tablet     Other Visit Diagnoses         Codes    Hypertension, unspecified type     I10    Relevant Medications    amLODIPine (Norvasc) 10 mg tablet    Other Relevant Orders    Comprehensive Metabolic Panel        Discussed bw. Recommendations given

## 2024-04-12 ENCOUNTER — OFFICE VISIT (OUTPATIENT)
Dept: UROLOGY | Facility: HOSPITAL | Age: 79
End: 2024-04-12
Payer: COMMERCIAL

## 2024-04-12 DIAGNOSIS — N50.89 SCROTAL SWELLING: ICD-10-CM

## 2024-04-12 PROCEDURE — 1160F RVW MEDS BY RX/DR IN RCRD: CPT | Performed by: UROLOGY

## 2024-04-12 PROCEDURE — 1159F MED LIST DOCD IN RCRD: CPT | Performed by: UROLOGY

## 2024-04-12 PROCEDURE — 99214 OFFICE O/P EST MOD 30 MIN: CPT | Performed by: UROLOGY

## 2024-04-12 NOTE — PROGRESS NOTES
FUV    Last visit - 8/24/23     HISTORY OF PRESENT ILLNESS:   Tomas Morataya is a 79 y.o. male who is being seen today for 6 MONTH FUV  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   -ED on Viagra  PAST MEDICAL HISTORY:  Past Medical History:   Diagnosis Date    Allergy to other foods     History of food allergy    Essential (primary) hypertension     Benign essential hypertension    Personal history of other endocrine, nutritional and metabolic disease     History of hyperlipidemia    Personal history of other endocrine, nutritional and metabolic disease     History of diabetes mellitus       PAST SURGICAL HISTORY:  Past Surgical History:   Procedure Laterality Date    OTHER SURGICAL HISTORY  08/29/2019    Cyst excision    OTHER SURGICAL HISTORY  08/29/2019    Heart surgery        ALLERGIES:   Allergies   Allergen Reactions    Cefaclor Anaphylaxis and Swelling    Cocoa Hives     Chocolate    Penicillins Swelling and Rash     FACIAL SWELLING        MEDICATIONS:   Current Outpatient Medications   Medication Instructions    amLODIPine (NORVASC) 10 mg, oral, Daily    aspirin 81 mg EC tablet 1 tablet, oral, Daily    atorvastatin (LIPITOR) 40 mg, oral, Daily    blood sugar diagnostic strip One touch ultra test strips<BR>Test daily Dx: .00    cyanocobalamin, vitamin B-12, (Vitamin B-12) 1,000 mcg tablet extended release 1 tablet, oral, Daily    doxazosin (Cardura) 4 mg tablet TAKE 1 TABLET DAILY    econazole nitrate 1 % cream Topical, 2 times daily PRN, Apply to affected    econazole nitrate 1 % cream Topical, 2 times daily, To affected areas of feet for 3-4 weeks; repeat as needed for flares    folic acid (Folvite) 1 mg tablet 1 tablet, oral, Daily    hydroCHLOROthiazide (HYDRODIURIL) 25 mg, oral, Daily    loratadine (Claritin) 10 mg tablet 1 tablet, oral, Daily    losartan (COZAAR) 100 mg, oral, Daily    metFORMIN (GLUCOPHAGE) 1,000 mg, oral, 2 times daily    multivitamin tablet  "oral    nystatin (Mycostatin) 100,000 unit/gram powder Topical, 2 times daily, Until rash cleared    pioglitazone (ACTOS) 15 mg, oral, Daily    sildenafil (Viagra) 50 mg tablet 2 tablets, oral, As needed, Before intercourse    spironolactone (ALDACTONE) 25 mg, oral, Daily    triamcinolone (Kenalog) 0.1 % cream Topical, 2 times daily PRN    triamcinolone (Nasacort) 55 mcg nasal inhaler 1-2 sprays, Each Nostril, Daily        PHYSICAL EXAM:  There were no vitals taken for this visit.  Constitutional: Patient appears well-developed and well-nourished. No distress.    Pulmonary/Chest: Effort normal. No respiratory distress.   Abdominal: Soft, ND NT  : fullness of the right testicle  Musculoskeletal: Normal range of motion.    Neurological: Alert and oriented to person, place, and time.  Psychiatric: Normal mood and affect. Behavior is normal. Thought content normal.      Labs:  No results found for: \"TESTOSTERONE\"  No results found for: \"PSA\"  No components found for: \"CBC\"  Lab Results   Component Value Date    CREATININE 0.97 04/01/2024     No components found for: \"TESTOTMS\"  No results found for: \"TESTF\"    Imaging:    Discussion: PSA Results reviewed with patient. Patient reassured. Complains of scrotal swelling again. Upon exam, he had some fluid collection around scrotal. Notes he has been wearing tight underwear for scrotal support. Denies any pain or bothersome complaints. Only complaint is when he sits on the toilet, his scrotum touches the water, but otherwise no pain or discomfort.Will repeat scrotal US to evaluate this. Erections not a priority. Follow up with scrotal US results.         Assessment:    No diagnosis found.    Tomas Morataya is a 79 y.o. male here for FUV     Plan:   1) scrotal US and follow up with results  All questions and concerns were addressed. Patient verbalizes understanding and has no other questions at this time.     Scribe Attestation  By signing my name below, I, Aliyah Edwards , " Scribe   attest that this documentation has been prepared under the direction and in the presence of Marc Red MD.

## 2024-05-01 ENCOUNTER — HOSPITAL ENCOUNTER (OUTPATIENT)
Dept: RADIOLOGY | Facility: CLINIC | Age: 79
Discharge: HOME | End: 2024-05-01
Payer: COMMERCIAL

## 2024-05-01 DIAGNOSIS — N50.89 SCROTAL SWELLING: ICD-10-CM

## 2024-05-01 PROCEDURE — 93975 VASCULAR STUDY: CPT

## 2024-05-09 NOTE — PROGRESS NOTES
FUV    Last visit - 4/12/24     HISTORY OF PRESENT ILLNESS:   Tomas Morataya is a 79 y.o. male who is being seen today for scrotal US results  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   -ED on Viagra  PAST MEDICAL HISTORY:  Past Medical History:   Diagnosis Date    Allergy to other foods     History of food allergy    Essential (primary) hypertension     Benign essential hypertension    Personal history of other endocrine, nutritional and metabolic disease     History of hyperlipidemia    Personal history of other endocrine, nutritional and metabolic disease     History of diabetes mellitus       PAST SURGICAL HISTORY:  Past Surgical History:   Procedure Laterality Date    OTHER SURGICAL HISTORY  08/29/2019    Cyst excision    OTHER SURGICAL HISTORY  08/29/2019    Heart surgery        ALLERGIES:   Allergies   Allergen Reactions    Cefaclor Anaphylaxis and Swelling    Cocoa Hives     Chocolate    Penicillins Swelling and Rash     FACIAL SWELLING        MEDICATIONS:   Current Outpatient Medications   Medication Instructions    amLODIPine (NORVASC) 10 mg, oral, Daily    aspirin 81 mg EC tablet 1 tablet, oral, Daily    atorvastatin (LIPITOR) 40 mg, oral, Daily    blood sugar diagnostic strip One touch ultra test strips<BR>Test daily Dx: .00    cyanocobalamin, vitamin B-12, (Vitamin B-12) 1,000 mcg tablet extended release 1 tablet, oral, Daily    doxazosin (Cardura) 4 mg tablet TAKE 1 TABLET DAILY    econazole nitrate 1 % cream Topical, 2 times daily PRN, Apply to affected    econazole nitrate 1 % cream Topical, 2 times daily, To affected areas of feet for 3-4 weeks; repeat as needed for flares    folic acid (Folvite) 1 mg tablet 1 tablet, oral, Daily    hydroCHLOROthiazide (HYDRODIURIL) 25 mg, oral, Daily    loratadine (Claritin) 10 mg tablet 1 tablet, oral, Daily    losartan (COZAAR) 100 mg, oral, Daily    metFORMIN (GLUCOPHAGE) 1,000 mg, oral, 2 times daily    multivitamin  "tablet oral    nystatin (Mycostatin) 100,000 unit/gram powder Topical, 2 times daily, Until rash cleared    pioglitazone (ACTOS) 15 mg, oral, Daily    sildenafil (Viagra) 50 mg tablet 2 tablets, oral, As needed, Before intercourse    spironolactone (ALDACTONE) 25 mg, oral, Daily    triamcinolone (Kenalog) 0.1 % cream Topical, 2 times daily PRN    triamcinolone (Nasacort) 55 mcg nasal inhaler 1-2 sprays, Each Nostril, Daily        PHYSICAL EXAM:  There were no vitals taken for this visit.  Constitutional: Patient appears well-developed and well-nourished. No distress.    Pulmonary/Chest: Effort normal. No respiratory distress.   Abdominal: Soft, ND NT  : fullness of the right testicle  Musculoskeletal: Normal range of motion.    Neurological: Alert and oriented to person, place, and time.  Psychiatric: Normal mood and affect. Behavior is normal. Thought content normal.      Labs:  No results found for: \"TESTOSTERONE\"  No results found for: \"PSA\"  No components found for: \"CBC\"  Lab Results   Component Value Date    CREATININE 0.97 04/01/2024     No components found for: \"TESTOTMS\"  No results found for: \"TESTF\"    Imaging: Scrotal US on 5/1/24 demonstrated No sign of intrauterine mass or torsion.  Large left hydrocele with some internal debris and internal septations. Underlying infection can not be excluded.  3 mm left epididymal head cyst.    Discussion: scrotal US results reviewed with patient. Patient reassured. He had recurrence of left hydrocele confirmed by US. Discussed with patient if he wants a repair of the left hydrocele if his symptoms become bothersome, pt will think about it. Notes he has been wearing tight underwear for scrotal support. Denies any pain or bothersome complaints. Only complaint is when he sits on the toilet, his scrotum touches the water, but otherwise no pain or discomfort. Erections not a priority. Follow up in 1 year with PSA prior or sooner if he wants to address the hydrocele or " if it becomes bothersome.       Assessment:      1. Screening for prostate cancer  PSA          Tomas Morataya is a 79 y.o. male here for FUV     Plan:   1) follow up in 1 year with PSA prior  All questions and concerns were addressed. Patient verbalizes understanding and has no other questions at this time.     Scribe Attestation  By signing my name below, I, Aliyah Edwards , Scribe   attest that this documentation has been prepared under the direction and in the presence of Marc Red MD.

## 2024-05-10 ENCOUNTER — OFFICE VISIT (OUTPATIENT)
Dept: UROLOGY | Facility: HOSPITAL | Age: 79
End: 2024-05-10
Payer: COMMERCIAL

## 2024-05-10 DIAGNOSIS — Z12.5 SCREENING FOR PROSTATE CANCER: ICD-10-CM

## 2024-05-10 PROCEDURE — 99214 OFFICE O/P EST MOD 30 MIN: CPT | Performed by: UROLOGY

## 2024-05-10 PROCEDURE — 1160F RVW MEDS BY RX/DR IN RCRD: CPT | Performed by: UROLOGY

## 2024-05-10 PROCEDURE — 1159F MED LIST DOCD IN RCRD: CPT | Performed by: UROLOGY

## 2024-05-22 DIAGNOSIS — N40.0 BENIGN PROSTATIC HYPERPLASIA, UNSPECIFIED WHETHER LOWER URINARY TRACT SYMPTOMS PRESENT: ICD-10-CM

## 2024-05-22 DIAGNOSIS — I10 HYPERTENSION, UNSPECIFIED TYPE: ICD-10-CM

## 2024-05-22 DIAGNOSIS — I25.10 CORONARY ARTERIOSCLEROSIS: ICD-10-CM

## 2024-05-22 DIAGNOSIS — E78.5 HYPERLIPIDEMIA, UNSPECIFIED HYPERLIPIDEMIA TYPE: ICD-10-CM

## 2024-05-22 DIAGNOSIS — E11.69 TYPE 2 DIABETES MELLITUS WITH OTHER SPECIFIED COMPLICATION, WITHOUT LONG-TERM CURRENT USE OF INSULIN (MULTI): ICD-10-CM

## 2024-05-22 DIAGNOSIS — I10 ESSENTIAL HYPERTENSION: ICD-10-CM

## 2024-05-22 RX ORDER — ATORVASTATIN CALCIUM 40 MG/1
40 TABLET, FILM COATED ORAL DAILY
Qty: 90 TABLET | Refills: 1 | Status: SHIPPED | OUTPATIENT
Start: 2024-05-22 | End: 2024-11-18

## 2024-05-22 RX ORDER — DOXAZOSIN 4 MG/1
4 TABLET ORAL NIGHTLY
Qty: 30 TABLET | Refills: 0 | Status: SHIPPED | OUTPATIENT
Start: 2024-05-22 | End: 2024-06-21

## 2024-05-22 RX ORDER — DOXAZOSIN 4 MG/1
4 TABLET ORAL DAILY
Qty: 90 TABLET | Refills: 1 | Status: SHIPPED | OUTPATIENT
Start: 2024-05-22

## 2024-05-22 RX ORDER — SPIRONOLACTONE 25 MG/1
25 TABLET ORAL DAILY
Qty: 90 TABLET | Refills: 1 | Status: SHIPPED | OUTPATIENT
Start: 2024-05-22 | End: 2024-11-18

## 2024-05-22 NOTE — TELEPHONE ENCOUNTER
Pt called rx line at 933a upset that we AGAIN CA these rx's with [facility]. I do not see that we did that.    Pt states he needs a PRINTED rx for DOXAZOSIN and one [facility]    Next OV 10/7  Pt compliant  Pt uses [facility] Thx

## 2024-07-03 DIAGNOSIS — I10 ESSENTIAL HYPERTENSION: ICD-10-CM

## 2024-07-03 RX ORDER — LOSARTAN POTASSIUM 100 MG/1
100 TABLET ORAL DAILY
Qty: 90 TABLET | Refills: 3 | OUTPATIENT
Start: 2024-07-03

## 2024-07-04 DIAGNOSIS — I10 ESSENTIAL HYPERTENSION: ICD-10-CM

## 2024-07-04 RX ORDER — LOSARTAN POTASSIUM 100 MG/1
100 TABLET ORAL DAILY
Qty: 90 TABLET | Refills: 0 | Status: SHIPPED | OUTPATIENT
Start: 2024-07-04 | End: 2024-10-02

## 2024-07-20 NOTE — TELEPHONE ENCOUNTER
Next OV 10/7  Last rx 4/4- 90d  Pharm request  
Aaron is a previously healthy 17-year-old male here with parents for evaluation of chest pain that has been intermittent for the past 2 months.  Patient says that randomly he will feel some chest pain mostly to the bilateral lower rib areas worsening with inspiration.  Occasionally will be central.  He denies any temporal relation to activities or eating.  Occasionally he does feel some heartburn-like symptoms.  Not lost any consciousness or had any dizziness with this.  Denies any illnesses or trauma.  Says that he experienced intense last night and this morning so decided to come in for evaluation finally.    Heads examination reveals that patient daily vapor and marijuana use during school year but has reportedly decreased in the past 2 weeks after school is finished.  Denies any alcohol use denies any SI HI denies any sexual activity.  Patient reports juan for several hours a day.  No other significant past medical surgical history reported.

## 2024-08-01 DIAGNOSIS — I10 ESSENTIAL HYPERTENSION: ICD-10-CM

## 2024-08-02 RX ORDER — HYDROCHLOROTHIAZIDE 25 MG/1
25 TABLET ORAL DAILY
Qty: 90 TABLET | Refills: 3 | OUTPATIENT
Start: 2024-08-02

## 2024-08-27 ASSESSMENT — DERMATOLOGY QUALITY OF LIFE (QOL) ASSESSMENT
RATE HOW EMOTIONALLY BOTHERED YOU ARE BY YOUR SKIN PROBLEM (FOR EXAMPLE, WORRY, EMBARRASSMENT, FRUSTRATION): 0 - NEVER BOTHERED
RATE HOW BOTHERED YOU ARE BY EFFECTS OF YOUR SKIN PROBLEMS ON YOUR ACTIVITIES (EG, GOING OUT, ACCOMPLISHING WHAT YOU WANT, WORK ACTIVITIES OR YOUR RELATIONSHIPS WITH OTHERS): 0 - NEVER BOTHERED
RATE HOW BOTHERED YOU ARE BY EFFECTS OF YOUR SKIN PROBLEMS ON YOUR ACTIVITIES (EG, GOING OUT, ACCOMPLISHING WHAT YOU WANT, WORK ACTIVITIES OR YOUR RELATIONSHIPS WITH OTHERS): 0 - NEVER BOTHERED
RATE HOW BOTHERED YOU ARE BY SYMPTOMS OF YOUR SKIN PROBLEM (EG, ITCHING, STINGING BURNING, HURTING OR SKIN IRRITATION): 1
RATE HOW EMOTIONALLY BOTHERED YOU ARE BY YOUR SKIN PROBLEM (FOR EXAMPLE, WORRY, EMBARRASSMENT, FRUSTRATION): 0 - NEVER BOTHERED
RATE HOW BOTHERED YOU ARE BY SYMPTOMS OF YOUR SKIN PROBLEM (EG, ITCHING, STINGING BURNING, HURTING OR SKIN IRRITATION): 1

## 2024-08-27 ASSESSMENT — PATIENT GLOBAL ASSESSMENT (PGA): WHAT IS THE PGA: PATIENT GLOBAL ASSESSMENT:  2 - MILD

## 2024-08-28 ENCOUNTER — APPOINTMENT (OUTPATIENT)
Dept: DERMATOLOGY | Facility: CLINIC | Age: 79
End: 2024-08-28
Payer: COMMERCIAL

## 2024-08-28 DIAGNOSIS — D18.01 HEMANGIOMA OF SKIN: ICD-10-CM

## 2024-08-28 DIAGNOSIS — L57.0 ACTINIC KERATOSIS: ICD-10-CM

## 2024-08-28 DIAGNOSIS — L82.1 SEBORRHEIC KERATOSIS: ICD-10-CM

## 2024-08-28 DIAGNOSIS — L21.9 SEBORRHEIC DERMATITIS: Primary | ICD-10-CM

## 2024-08-28 DIAGNOSIS — L57.8 DIFFUSE PHOTODAMAGE OF SKIN: ICD-10-CM

## 2024-08-28 DIAGNOSIS — D22.5 MELANOCYTIC NEVUS OF TRUNK: ICD-10-CM

## 2024-08-28 DIAGNOSIS — Z85.828 HISTORY OF NONMELANOMA SKIN CANCER: ICD-10-CM

## 2024-08-28 DIAGNOSIS — D48.5 NEOPLASM OF UNCERTAIN BEHAVIOR OF SKIN: ICD-10-CM

## 2024-08-28 RX ORDER — KETOCONAZOLE 20 MG/ML
SHAMPOO, SUSPENSION TOPICAL
Qty: 120 ML | Refills: 11 | Status: SHIPPED | OUTPATIENT
Start: 2024-08-28

## 2024-08-30 LAB
LABORATORY COMMENT REPORT: NORMAL
PATH REPORT.FINAL DX SPEC: NORMAL
PATH REPORT.GROSS SPEC: NORMAL
PATH REPORT.MICROSCOPIC SPEC OTHER STN: NORMAL
PATH REPORT.RELEVANT HX SPEC: NORMAL
PATH REPORT.TOTAL CANCER: NORMAL

## 2024-09-01 NOTE — PROGRESS NOTES
Subjective     Tomas Morataya is a 79 y.o. male who presents for the following: Skin Check.  He notes a dry, flaky scalp recently.  He denies any other new, changing, or concerning skin lesions since his last visit; no bleeding, itching, or burning lesions.      Review of Systems:  No other skin or systemic complaints other than what is documented elsewhere in the note.    The following portions of the chart were reviewed this encounter and updated as appropriate:       Skin Cancer History  No skin cancer on file.    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 Gordon on 3/27/19  - AKs   - stasis dermatitis with early lipodermatosclerosis   - Tinea pedis  - no h/o melanoma    Family History:    No family history of melanoma or skin cancer    Social History:    The patient works as the  for local [employer]    Allergies:  Cefaclor, Cocoa, and Penicillins    Current Medications / CAM's:    Current Outpatient Medications:     amLODIPine (Norvasc) 10 mg tablet, Take 1 tablet (10 mg) by mouth once daily., Disp: 90 tablet, Rfl: 1    aspirin 81 mg EC tablet, Take 1 tablet (81 mg) by mouth once daily., Disp: , Rfl:     atorvastatin (Lipitor) 40 mg tablet, Take 1 tablet (40 mg) by mouth once daily., Disp: 90 tablet, Rfl: 1    cyanocobalamin, vitamin B-12, (Vitamin B-12) 1,000 mcg tablet extended release, Take 1 tablet (1,000 mcg) by mouth once daily., Disp: , Rfl:     doxazosin (Cardura) 4 mg tablet, Take 1 tablet (4 mg) by mouth once daily., Disp: 90 tablet, Rfl: 1    folic acid (Folvite) 1 mg tablet, Take 1 tablet (1 mg) by mouth once daily., Disp: , Rfl:      loratadine (Claritin) 10 mg tablet, Take 1 tablet (10 mg) by mouth once daily., Disp: , Rfl:     losartan (Cozaar) 100 mg tablet, Take 1 tablet (100 mg) by mouth once daily., Disp: 90 tablet, Rfl: 0    metFORMIN (Glucophage) 1,000 mg tablet, Take 1 tablet (1,000 mg) by mouth 2 times a day., Disp: 180 tablet, Rfl: 1    multivitamin tablet, Take by mouth., Disp: , Rfl:     pioglitazone (Actos) 15 mg tablet, Take 1 tablet (15 mg) by mouth once daily., Disp: 90 tablet, Rfl: 1    sildenafil (Viagra) 50 mg tablet, Take 2 tablets (100 mg) by mouth if needed. Before intercourse, Disp: , Rfl:     spironolactone (Aldactone) 25 mg tablet, Take 1 tablet (25 mg) by mouth once daily., Disp: 90 tablet, Rfl: 1    triamcinolone (Nasacort) 55 mcg nasal inhaler, Administer 1-2 sprays into each nostril once daily., Disp: , Rfl:     blood sugar diagnostic strip, One touch ultra test strips Test daily Dx: .00, Disp: , Rfl:     doxazosin (Cardura) 4 mg tablet, Take 1 tablet (4 mg) by mouth once daily at bedtime., Disp: 30 tablet, Rfl: 0    econazole nitrate 1 % cream, Apply topically 2 times a day as needed. Apply to affected, Disp: , Rfl:     econazole nitrate 1 % cream, Apply topically 2 times a day. To affected areas of feet for 3-4 weeks; repeat as needed for flares, Disp: 85 g, Rfl: 11    hydroCHLOROthiazide (HYDRODiuril) 25 mg tablet, Take 1 tablet (25 mg) by mouth once daily., Disp: 90 tablet, Rfl: 0    ketoconazole (NIZOral) 2 % shampoo, Wash affected areas of scalp 2-3 times weekly as directed, Disp: 120 mL, Rfl: 11    nystatin (Mycostatin) 100,000 unit/gram powder, Apply topically 2 times a day. Until rash cleared, Disp: , Rfl:     triamcinolone (Kenalog) 0.1 % cream, Apply topically 2 times a day as needed., Disp: , Rfl:      Objective   Well appearing patient in no apparent distress; mood and affect are within normal limits.    A waist-up examination was performed including scalp, face, eyes, ears, nose, lips, neck,  chest, axillae, abdomen, back, and bilateral upper extremities. All findings within normal limits unless otherwise noted below.        Assessment/Plan   1. Seborrheic dermatitis  Scalp  On the patient's scalp, there are pink, scaly patches with whitish-yellowish, greasy scale    Seborrheic Dermatitis - flare on scalp.  The potentially chronic and intermittently flaring nature of this condition and treatment options were discussed extensively with the patient today.  At this time, I recommend topical anti-fungal therapy with Ketoconazole 2% shampoo, which the patient was instructed to use 2-3 days per week, alternating with over-the-counter anti-dandruff shampoos, such as Head & Shoulders, Selsun Blue, and Neutrogena T-gel, every month.  The risks, benefits, and side effects of this medication were discussed.  The patient expressed understanding and is in agreement with this plan.    ketoconazole (NIZOral) 2 % shampoo - Scalp  Wash affected areas of scalp 2-3 times weekly as directed    2. Neoplasm of uncertain behavior of skin  Right Anterior Vertex Scalp  1.2 cm erythematous, scaly plaque          Shave removal    Lesion length (cm):  1.2  Margin per side (cm):  0  Lesion diameter (cm):  1.2  Informed consent: discussed and consent obtained    Timeout: patient name, date of birth, surgical site, and procedure verified    Procedure prep:  Patient was prepped and draped  Anesthesia: the lesion was anesthetized in a standard fashion    Anesthetic:  1% lidocaine w/ epinephrine 1-100,000 local infiltration  Instrument used: flexible razor blade    Hemostasis achieved with: aluminum chloride    Outcome: patient tolerated procedure well    Post-procedure details: sterile dressing applied and wound care instructions given    Dressing type: bandage and petrolatum      Specimen 1 - Dermatopathology- DERM LAB  Differential Diagnosis: HAK v SCCIS v SK  Check Margins Yes/No?:    Comments:    Dermpath Lab: Routine Histopathology  (formalin-fixed tissue)    3. Actinic keratosis (16)  Mid Frontal Scalp (16)  Scattered on the patient's scalp, there are multiple erythematous, gritty, scaly macules     Actinic Keratoses - scattered on scalp.  The pre-cancerous nature of these lesions and treatment options were discussed with the patient today.  At this time, I recommend treatment with liquid nitrogen cryotherapy.  The patient expressed understanding, is in agreement with this plan, and wishes to proceed with cryotherapy today.    Destr of lesion - Mid Frontal Scalp (16)  Complexity: simple    Destruction method: cryotherapy    Informed consent: discussed and consent obtained    Lesion destroyed using liquid nitrogen: Yes    Cryotherapy cycles:  1  Outcome: patient tolerated procedure well with no complications    Post-procedure details: wound care instructions given      4. Melanocytic nevus of trunk  Scattered on the patient's face, neck, trunk, and bilateral upper extremities, there are several small, round- to oval-shaped, brown-pigmented and pink-colored, symmetric, uniform-appearing macules and dome-shaped papules    Clinically benign- to slightly atypical-appearing nevi - the clinically benign- to slightly atypical-appearing nature of the patient's nevi was discussed with the patient today.  None of the patient's nevi meet threshold for biopsy today.  I emphasized the importance of performing monthly self-skin exams using the ABCDs of monitoring moles, which were reviewed with the patient today and an informational hand-out provided.  I also emphasized the importance of sun avoidance and sun protection with daily sunscreen use.  The patient expressed understanding and is in agreement with this plan.    5. Seborrheic keratosis  Scattered on the patient's face, neck, trunk, and bilateral upper extremities, there are multiple tan- to light brown-colored, hyperkeratotic, stuck-on appearing papules of varying size and shape    Seborrheic  Keratoses - the benign nature of these lesions was discussed with the patient today and reassurance provided.  No treatment is medically indicated for these lesions at this time.    6. Hemangioma of skin  Scattered on the patient's face, neck, trunk, and bilateral upper extremities, there are multiple small, round, cherry red- to purplish-colored, symmetric, uniform, vascular-appearing macules and papules    Cherry Angiomas - the benign nature of these vascular lesions was discussed with the patient today and reassurance provided.  No treatment is medically indicated for these lesions at this time.    7. History of nonmelanoma skin cancer  Right Ear  On the patient's right ear helix and left ear mid posterior helix, there are well-healed scars with no evidence of recurrent growth on exam today.    History of squamous cell carcinomas and actinic keratoses and photodamage.  There is no evidence of recurrence at the sites of the patient's previously treated SCCs on exam today.  The signs and symptoms of skin cancer were reviewed and the patient was advised to practice sun protection and sun avoidance, use daily sunscreen, and perform regular self skin exams.  I will have the patient return to our office in 4 to 6 months, pending the above biopsy result, for routine follow-up and skin exam, and the patient was instructed to call our office should the patient notice any new, changing, symptomatic, or otherwise concerning skin lesions before then.  The patient expressed understanding and is in agreement with this plan.    8. Diffuse photodamage of skin  Photodistributed  Diffuse photodamage with actinic changes with telangiectasia and mottled pigmentation in sun-exposed areas.    Photodamage.  The signs and symptoms of skin cancer were reviewed and the patient was advised to practice sun protection and sun avoidance, use daily sunscreen, and perform regular self skin exams.  Sun protection was discussed, including  avoiding the mid-day sun, wearing a sunscreen with SPF at least 50, and stressing the need for reapplication of sunscreen and applying more than they think they need.

## 2024-09-04 ENCOUNTER — HOSPITAL ENCOUNTER (OUTPATIENT)
Dept: RADIOLOGY | Facility: CLINIC | Age: 79
Discharge: HOME | End: 2024-09-04
Payer: COMMERCIAL

## 2024-09-04 ENCOUNTER — APPOINTMENT (OUTPATIENT)
Dept: ORTHOPEDIC SURGERY | Facility: CLINIC | Age: 79
End: 2024-09-04
Payer: COMMERCIAL

## 2024-09-04 DIAGNOSIS — G89.29 CHRONIC PAIN OF LEFT KNEE: ICD-10-CM

## 2024-09-04 DIAGNOSIS — M25.562 CHRONIC PAIN OF LEFT KNEE: ICD-10-CM

## 2024-09-04 PROCEDURE — 1125F AMNT PAIN NOTED PAIN PRSNT: CPT | Performed by: ORTHOPAEDIC SURGERY

## 2024-09-04 PROCEDURE — 20610 DRAIN/INJ JOINT/BURSA W/O US: CPT | Performed by: ORTHOPAEDIC SURGERY

## 2024-09-04 PROCEDURE — 99214 OFFICE O/P EST MOD 30 MIN: CPT | Performed by: ORTHOPAEDIC SURGERY

## 2024-09-04 PROCEDURE — 1159F MED LIST DOCD IN RCRD: CPT | Performed by: ORTHOPAEDIC SURGERY

## 2024-09-04 PROCEDURE — 1160F RVW MEDS BY RX/DR IN RCRD: CPT | Performed by: ORTHOPAEDIC SURGERY

## 2024-09-04 PROCEDURE — 73564 X-RAY EXAM KNEE 4 OR MORE: CPT | Mod: LT

## 2024-09-04 PROCEDURE — 1036F TOBACCO NON-USER: CPT | Performed by: ORTHOPAEDIC SURGERY

## 2024-09-04 RX ORDER — TRIAMCINOLONE ACETONIDE 40 MG/ML
1 INJECTION, SUSPENSION INTRA-ARTICULAR; INTRAMUSCULAR
Status: COMPLETED | OUTPATIENT
Start: 2024-09-04 | End: 2024-09-04

## 2024-09-04 ASSESSMENT — PAIN SCALES - GENERAL: PAINLEVEL_OUTOF10: 3

## 2024-09-04 ASSESSMENT — PAIN - FUNCTIONAL ASSESSMENT: PAIN_FUNCTIONAL_ASSESSMENT: 0-10

## 2024-09-04 NOTE — PROGRESS NOTES
This is a consultation from Dr. Raquel Galvez DO for left knee pain      This is a 79 y.o. male who presents for left knee pain.  I have seen in the past for his right knee.  Right knee is doing okay but he is increasing left knee pain over the last couple of months.  He had issues with this on and off in the past but he states it is exacerbated since July.  Sharp stabbing pain over the anterior medial knee worse with walking.  Its associate with popping and catching.  It bothers him at night.  He has never had injections or surgery on that knee.  No numbness no tingling no fevers no chills no shooting pain down the leg.    Physical Exam    There has been no interval change in this patient's past medical, surgical, medications, allergies, family history or social history since the most recent visit to a provider within our department. 14 point review of systems was performed, reviewed, and negative except for pertinent positives documented in the history of present illness.     Constitutional: well developed, well nourished male in no acute distress  Psychiatric: normal mood, appropriate affect  Eyes: sclera anicteric  HENT: normocephalic/atraumatic  CV: regular rate and rhythm   Respiratory: non labored breathing  Integumentary: no rash  Neurological: moves all extremities    Left knee exam: skin intact no lacerations or abrations.  1+ effusion.  Tender medial joint line. negative log roll negative patellar grind. ROM 0-120. stable to varus and valgus stress at 0 and 30 degrees. negative lachman negative posterior drawer negative jerry. 5/5 ehl/fhl/gs/ta. silt s/s/sp/dp/t. 2+ dp/pt        Xrays were ordered by me, they were reviewed and independently interpreted by me today, they show severe degenerative changes in the left knee bone-on-bone arthritis    L Inj/Asp: L knee on 9/4/2024 8:51 AM  Indications: pain and joint swelling  Details: 22 G needle, anterolateral approach  Medications: 1 mL triamcinolone  acetonide 40 mg/mL    Discussion:  I discussed the conservative treatment options for knee osteoarthritis including but not limited to physical therapy, oral NSAIDS, activity and lifestyle modification, and corticosteroid injections. Pt has elected to undergo a cortisone injection today. I have explained the risk and benefits of an injection including the possibility of joint infection, bleeding, damage to cartilage, allergic reaction. Patient verbalized understanding and gave verbal consent wishes to proceed with a intra-articular cortisone injection for their knee.    Procedure:  After discussing the risk and benefits of the procedure, we proceeded with an intra-articular left knee injection. We discussed the risks and benefits and potential morbidity related to the treatment, and to the prescription medication administered in the injection    With the patient's informed verbal consent, the left knee was prepped in standard sterile fashion with Chlorhexidine. The skin was then anesthetized with ethyl chloride spray and cleaned again with Chlorhexidine. The knee was then apirated/injected with a prefilled 20-gauge syringe of 40 mg Kenalog + 4 ml Lidocaine using the lateral approach without complications.  The patient tolerated this well and felt immediate initial relief of symptoms. A bandaid was applied and the patient ambulated out of the clinic on ther own accord without difficulty. Patient was instructed to avoid physical activity for 24-48 hours to prevent the knees from swelling and may ice the knees as tolerated. Patient should contact the office if any signs of of infection appear: redness, fever, chills, drainage, swelling or warmth to the knees.  Pt understands that the injections can be repeated no sooner than 3 months.  Procedure, treatment alternatives, risks and benefits explained, specific risks discussed. Consent was given by the patient. Immediately prior to procedure a time out was called to  "verify the correct patient, procedure, equipment, support staff and site/side marked as required. Patient was prepped and draped in the usual sterile fashion.             Impression/Plan: This is a 79 y.o. male with severe left knee arthritis.  I had an in depth discussion with the patient regarding treatment options for arthritis and their relative risks and benefits. We reviewed surgical and nonsurgical option for treatment. Treatments include anti inflammatory medications, physical therapy, weight loss, activity modification, use of assistive devices, injection therapies. We discussed current prescriptions and risks and benefits of continuation of prescription medication as apporpriate. We discussed that arthritis is often progressive over time, an in end stage arthritis surgical interventions can be considered, including arthroplasty. All questions were answered and the patient voiced their understanding.  Nonsurgical treatments for now we will see him back as needed    BMI Readings from Last 1 Encounters:   04/04/24 42.29 kg/m²      Lab Results   Component Value Date    CREATININE 0.97 04/01/2024     Tobacco Use: Low Risk  (5/10/2024)    Patient History     Smoking Tobacco Use: Never     Smokeless Tobacco Use: Never     Passive Exposure: Not on file      Computed MELD 3.0 unavailable. One or more values for this score either were not found within the given timeframe or did not fit some other criterion.  Computed MELD-Na unavailable. One or more values for this score either were not found within the given timeframe or did not fit some other criterion.       Lab Results   Component Value Date    HGBA1C 7.6 (H) 04/01/2024     No results found for: \"STAPHMRSASCR\"  "

## 2024-09-16 DIAGNOSIS — I10 ESSENTIAL HYPERTENSION: ICD-10-CM

## 2024-09-16 RX ORDER — HYDROCHLOROTHIAZIDE 25 MG/1
25 TABLET ORAL DAILY
Qty: 90 TABLET | Refills: 0 | Status: SHIPPED | OUTPATIENT
Start: 2024-09-16 | End: 2024-12-15

## 2024-09-16 NOTE — TELEPHONE ENCOUNTER
Pt called rx line @ 9:55am requesting RF on hydrochlorothiazide. Pt out. Pt requesting printed RX.     Next OV 10/7/24  Pt compliant  Ok for RF?   Pt out  Requesting printed rx  Please advise. Thanks. JW

## 2024-10-04 ENCOUNTER — LAB (OUTPATIENT)
Dept: LAB | Facility: LAB | Age: 79
End: 2024-10-04
Payer: COMMERCIAL

## 2024-10-04 DIAGNOSIS — I10 ESSENTIAL HYPERTENSION: ICD-10-CM

## 2024-10-04 DIAGNOSIS — D51.9 ANEMIA DUE TO VITAMIN B12 DEFICIENCY, UNSPECIFIED B12 DEFICIENCY TYPE: ICD-10-CM

## 2024-10-04 DIAGNOSIS — E78.5 HYPERLIPIDEMIA, UNSPECIFIED HYPERLIPIDEMIA TYPE: ICD-10-CM

## 2024-10-04 DIAGNOSIS — Z12.5 SCREENING FOR PROSTATE CANCER: ICD-10-CM

## 2024-10-04 DIAGNOSIS — I10 HYPERTENSION, UNSPECIFIED TYPE: ICD-10-CM

## 2024-10-04 DIAGNOSIS — E11.69 TYPE 2 DIABETES MELLITUS WITH OTHER SPECIFIED COMPLICATION, WITHOUT LONG-TERM CURRENT USE OF INSULIN: ICD-10-CM

## 2024-10-04 LAB
ALBUMIN SERPL BCP-MCNC: 3.6 G/DL (ref 3.4–5)
ALP SERPL-CCNC: 52 U/L (ref 33–136)
ALT SERPL W P-5'-P-CCNC: 20 U/L (ref 10–52)
ANION GAP SERPL CALC-SCNC: 14 MMOL/L (ref 10–20)
AST SERPL W P-5'-P-CCNC: 19 U/L (ref 9–39)
BASOPHILS # BLD AUTO: 0.03 X10*3/UL (ref 0–0.1)
BASOPHILS NFR BLD AUTO: 0.4 %
BILIRUB SERPL-MCNC: 0.6 MG/DL (ref 0–1.2)
BUN SERPL-MCNC: 22 MG/DL (ref 6–23)
CALCIUM SERPL-MCNC: 8.7 MG/DL (ref 8.6–10.3)
CHLORIDE SERPL-SCNC: 104 MMOL/L (ref 98–107)
CHOLEST SERPL-MCNC: 130 MG/DL (ref 0–199)
CHOLESTEROL/HDL RATIO: 2.5
CO2 SERPL-SCNC: 24 MMOL/L (ref 21–32)
CREAT SERPL-MCNC: 0.98 MG/DL (ref 0.5–1.3)
EGFRCR SERPLBLD CKD-EPI 2021: 79 ML/MIN/1.73M*2
EOSINOPHIL # BLD AUTO: 0.1 X10*3/UL (ref 0–0.4)
EOSINOPHIL NFR BLD AUTO: 1.3 %
ERYTHROCYTE [DISTWIDTH] IN BLOOD BY AUTOMATED COUNT: 14.8 % (ref 11.5–14.5)
EST. AVERAGE GLUCOSE BLD GHB EST-MCNC: 154 MG/DL
GLUCOSE SERPL-MCNC: 133 MG/DL (ref 74–99)
HBA1C MFR BLD: 7 %
HCT VFR BLD AUTO: 39.9 % (ref 41–52)
HDLC SERPL-MCNC: 51.7 MG/DL
HGB BLD-MCNC: 12.5 G/DL (ref 13.5–17.5)
IMM GRANULOCYTES # BLD AUTO: 0.02 X10*3/UL (ref 0–0.5)
IMM GRANULOCYTES NFR BLD AUTO: 0.3 % (ref 0–0.9)
LDLC SERPL CALC-MCNC: 66 MG/DL
LYMPHOCYTES # BLD AUTO: 2.28 X10*3/UL (ref 0.8–3)
LYMPHOCYTES NFR BLD AUTO: 30.2 %
MCH RBC QN AUTO: 29.6 PG (ref 26–34)
MCHC RBC AUTO-ENTMCNC: 31.3 G/DL (ref 32–36)
MCV RBC AUTO: 94 FL (ref 80–100)
MONOCYTES # BLD AUTO: 0.71 X10*3/UL (ref 0.05–0.8)
MONOCYTES NFR BLD AUTO: 9.4 %
NEUTROPHILS # BLD AUTO: 4.4 X10*3/UL (ref 1.6–5.5)
NEUTROPHILS NFR BLD AUTO: 58.4 %
NON HDL CHOLESTEROL: 78 MG/DL (ref 0–149)
NRBC BLD-RTO: 0 /100 WBCS (ref 0–0)
PLATELET # BLD AUTO: 261 X10*3/UL (ref 150–450)
POTASSIUM SERPL-SCNC: 4.7 MMOL/L (ref 3.5–5.3)
PROT SERPL-MCNC: 5.9 G/DL (ref 6.4–8.2)
PSA SERPL-MCNC: 1.73 NG/ML
RBC # BLD AUTO: 4.23 X10*6/UL (ref 4.5–5.9)
SODIUM SERPL-SCNC: 137 MMOL/L (ref 136–145)
TRIGL SERPL-MCNC: 63 MG/DL (ref 0–149)
VIT B12 SERPL-MCNC: 806 PG/ML (ref 211–911)
VLDL: 13 MG/DL (ref 0–40)
WBC # BLD AUTO: 7.5 X10*3/UL (ref 4.4–11.3)

## 2024-10-04 PROCEDURE — 80053 COMPREHEN METABOLIC PANEL: CPT

## 2024-10-04 PROCEDURE — 84443 ASSAY THYROID STIM HORMONE: CPT

## 2024-10-04 PROCEDURE — G0103 PSA SCREENING: HCPCS

## 2024-10-04 PROCEDURE — 83036 HEMOGLOBIN GLYCOSYLATED A1C: CPT

## 2024-10-04 PROCEDURE — 82607 VITAMIN B-12: CPT

## 2024-10-04 PROCEDURE — 36415 COLL VENOUS BLD VENIPUNCTURE: CPT

## 2024-10-04 PROCEDURE — 85025 COMPLETE CBC W/AUTO DIFF WBC: CPT

## 2024-10-04 PROCEDURE — 80061 LIPID PANEL: CPT

## 2024-10-07 ENCOUNTER — APPOINTMENT (OUTPATIENT)
Dept: PRIMARY CARE | Facility: CLINIC | Age: 79
End: 2024-10-07
Payer: COMMERCIAL

## 2024-10-07 VITALS
SYSTOLIC BLOOD PRESSURE: 126 MMHG | OXYGEN SATURATION: 96 % | TEMPERATURE: 97.9 F | BODY MASS INDEX: 41.67 KG/M2 | DIASTOLIC BLOOD PRESSURE: 70 MMHG | WEIGHT: 258.2 LBS | HEART RATE: 89 BPM

## 2024-10-07 DIAGNOSIS — I10 ESSENTIAL HYPERTENSION: ICD-10-CM

## 2024-10-07 DIAGNOSIS — N40.0 BENIGN PROSTATIC HYPERPLASIA WITHOUT URINARY OBSTRUCTION: ICD-10-CM

## 2024-10-07 DIAGNOSIS — R35.0 URINE FREQUENCY: ICD-10-CM

## 2024-10-07 DIAGNOSIS — E11.69 TYPE 2 DIABETES MELLITUS WITH OTHER SPECIFIED COMPLICATION, UNSPECIFIED WHETHER LONG TERM INSULIN USE (MULTI): ICD-10-CM

## 2024-10-07 DIAGNOSIS — I10 HYPERTENSION, UNSPECIFIED TYPE: ICD-10-CM

## 2024-10-07 DIAGNOSIS — D51.9 ANEMIA DUE TO VITAMIN B12 DEFICIENCY, UNSPECIFIED B12 DEFICIENCY TYPE: ICD-10-CM

## 2024-10-07 DIAGNOSIS — I73.9 PVD (PERIPHERAL VASCULAR DISEASE) (CMS-HCC): ICD-10-CM

## 2024-10-07 DIAGNOSIS — E66.01 MORBID OBESITY (MULTI): Primary | ICD-10-CM

## 2024-10-07 DIAGNOSIS — E78.5 HYPERLIPIDEMIA, UNSPECIFIED HYPERLIPIDEMIA TYPE: ICD-10-CM

## 2024-10-07 DIAGNOSIS — Z11.59 NEED FOR HEPATITIS C SCREENING TEST: ICD-10-CM

## 2024-10-07 DIAGNOSIS — I25.10 CORONARY ARTERIOSCLEROSIS: ICD-10-CM

## 2024-10-07 DIAGNOSIS — Z00.00 ROUTINE GENERAL MEDICAL EXAMINATION AT A HEALTH CARE FACILITY: ICD-10-CM

## 2024-10-07 DIAGNOSIS — N40.0 BENIGN PROSTATIC HYPERPLASIA, UNSPECIFIED WHETHER LOWER URINARY TRACT SYMPTOMS PRESENT: ICD-10-CM

## 2024-10-07 LAB
POC ALBUMIN /CREATININE RATIO MANUALLY ENTERED: <30 UG/MG CREAT
POC APPEARANCE, URINE: CLEAR
POC BILIRUBIN, URINE: NEGATIVE
POC BLOOD, URINE: NEGATIVE
POC COLOR, URINE: YELLOW
POC GLUCOSE, URINE: NEGATIVE MG/DL
POC KETONES, URINE: NEGATIVE MG/DL
POC LEUKOCYTES, URINE: NEGATIVE
POC NITRITE,URINE: NEGATIVE
POC PH, URINE: 7.5 PH
POC PROTEIN, URINE: NEGATIVE MG/DL
POC SPECIFIC GRAVITY, URINE: 1.02
POC URINE ALBUMIN: 30 MG/L
POC URINE CREATININE: 200 MG/DL
POC UROBILINOGEN, URINE: 0.2 EU/DL
TSH SERPL-ACNC: 2.62 MIU/L (ref 0.44–3.98)

## 2024-10-07 PROCEDURE — 3078F DIAST BP <80 MM HG: CPT | Performed by: FAMILY MEDICINE

## 2024-10-07 PROCEDURE — 82044 UR ALBUMIN SEMIQUANTITATIVE: CPT | Performed by: FAMILY MEDICINE

## 2024-10-07 PROCEDURE — 1159F MED LIST DOCD IN RCRD: CPT | Performed by: FAMILY MEDICINE

## 2024-10-07 PROCEDURE — 81003 URINALYSIS AUTO W/O SCOPE: CPT | Performed by: FAMILY MEDICINE

## 2024-10-07 PROCEDURE — 1036F TOBACCO NON-USER: CPT | Performed by: FAMILY MEDICINE

## 2024-10-07 PROCEDURE — 1160F RVW MEDS BY RX/DR IN RCRD: CPT | Performed by: FAMILY MEDICINE

## 2024-10-07 PROCEDURE — 99214 OFFICE O/P EST MOD 30 MIN: CPT | Performed by: FAMILY MEDICINE

## 2024-10-07 PROCEDURE — 99397 PER PM REEVAL EST PAT 65+ YR: CPT | Performed by: FAMILY MEDICINE

## 2024-10-07 PROCEDURE — 3074F SYST BP LT 130 MM HG: CPT | Performed by: FAMILY MEDICINE

## 2024-10-07 RX ORDER — LOSARTAN POTASSIUM 100 MG/1
100 TABLET ORAL DAILY
Qty: 90 TABLET | Refills: 1 | Status: SHIPPED | OUTPATIENT
Start: 2024-10-07 | End: 2025-04-05

## 2024-10-07 RX ORDER — PIOGLITAZONEHYDROCHLORIDE 15 MG/1
15 TABLET ORAL DAILY
Qty: 90 TABLET | Refills: 1 | Status: SHIPPED | OUTPATIENT
Start: 2024-10-07 | End: 2025-04-05

## 2024-10-07 RX ORDER — AMLODIPINE BESYLATE 10 MG/1
10 TABLET ORAL DAILY
Qty: 90 TABLET | Refills: 1 | Status: SHIPPED | OUTPATIENT
Start: 2024-10-07 | End: 2025-04-05

## 2024-10-07 RX ORDER — HYDROCHLOROTHIAZIDE 25 MG/1
25 TABLET ORAL DAILY
Qty: 90 TABLET | Refills: 1 | Status: SHIPPED | OUTPATIENT
Start: 2024-10-07 | End: 2025-04-05

## 2024-10-07 RX ORDER — ATORVASTATIN CALCIUM 40 MG/1
40 TABLET, FILM COATED ORAL DAILY
Qty: 90 TABLET | Refills: 1 | Status: SHIPPED | OUTPATIENT
Start: 2024-10-07 | End: 2025-04-05

## 2024-10-07 RX ORDER — METFORMIN HYDROCHLORIDE 1000 MG/1
1000 TABLET ORAL 2 TIMES DAILY
Qty: 180 TABLET | Refills: 1 | Status: SHIPPED | OUTPATIENT
Start: 2024-10-07 | End: 2025-04-05

## 2024-10-07 RX ORDER — SPIRONOLACTONE 25 MG/1
25 TABLET ORAL DAILY
Qty: 90 TABLET | Refills: 1 | Status: SHIPPED | OUTPATIENT
Start: 2024-10-07 | End: 2025-04-05

## 2024-10-07 RX ORDER — DOXAZOSIN 4 MG/1
4 TABLET ORAL DAILY
Qty: 90 TABLET | Refills: 1 | Status: SHIPPED | OUTPATIENT
Start: 2024-10-07

## 2024-10-07 ASSESSMENT — ENCOUNTER SYMPTOMS
SHORTNESS OF BREATH: 0
DIZZINESS: 0
POLYDIPSIA: 0
MYALGIAS: 0
SLEEP DISTURBANCE: 0
VOMITING: 0
HEADACHES: 0
DYSURIA: 0
CONSTIPATION: 0
FATIGUE: 1
POLYPHAGIA: 0
DIFFICULTY URINATING: 0
PALPITATIONS: 0
BLOOD IN STOOL: 0
DYSPHORIC MOOD: 0
ABDOMINAL PAIN: 0
NAUSEA: 0
DIARRHEA: 0

## 2024-10-07 ASSESSMENT — PATIENT HEALTH QUESTIONNAIRE - PHQ9
2. FEELING DOWN, DEPRESSED OR HOPELESS: NOT AT ALL
SUM OF ALL RESPONSES TO PHQ9 QUESTIONS 1 AND 2: 0
1. LITTLE INTEREST OR PLEASURE IN DOING THINGS: NOT AT ALL

## 2024-10-07 NOTE — PATIENT INSTRUCTIONS
Recommend a predominant low fat whole foods plant based diet.  Cut back on meat, dairy, processed carbs, salt and oils(especially palm and coconut). Increase fiber in your diet.  Decrease alcohol as much as possible if you drink. Recommend regular exercise most days of the week(goal up to 150min per week). Also recommend good sleep habits aiming for 7-8 hours per night.     Follow up with your specialists as scheduled    Continue your current meds    Monitor for low blood pressures    Recommend flu, RSV and covid at the pharmacy    Return in 6  months, sooner if needed

## 2024-10-07 NOTE — PROGRESS NOTES
Subjective   Patient ID: Tomas Morataya is a 79 y.o. male who presents for Annual Exam (6 month check up, bw done)and multiple issues.    HPI     HTN: controlled  Lipids: controlled. HDL 51, LDL 66, TG 63  DM: controlled. A1c 7(7.6)  B12: controlled and nml  Anemia: stable. H/H 12.5/39.9. MCV 94. No unusual bleeding.   CKD: stable. Cr nml, GFR 79  BPH: stable.   Left knee: chronic and recurrent. Seeing ortho.   BMI: high. Lost 4 lbs since last ov. Trying to work on diet.     Review of Systems   Constitutional:  Positive for fatigue.   HENT: Negative.     Eyes:  Negative for visual disturbance.   Respiratory:  Negative for shortness of breath.    Cardiovascular:  Negative for chest pain and palpitations.   Gastrointestinal:  Negative for abdominal pain, blood in stool, constipation, diarrhea, nausea and vomiting.   Endocrine: Negative for cold intolerance, heat intolerance, polydipsia, polyphagia and polyuria.   Genitourinary:  Negative for difficulty urinating and dysuria.   Musculoskeletal:  Negative for myalgias.   Skin:  Negative for rash.   Allergic/Immunologic: Positive for environmental allergies.   Neurological:  Negative for dizziness and headaches.   Psychiatric/Behavioral:  Negative for dysphoric mood and sleep disturbance.        Objective   /70   Pulse 89   Temp 36.6 °C (97.9 °F)   Wt 117 kg (258 lb 3.2 oz)   SpO2 96%   BMI 41.67 kg/m²     Physical Exam  Vitals and nursing note reviewed.   Constitutional:       General: He is not in acute distress.     Appearance: Normal appearance. He is not toxic-appearing.   HENT:      Head: Normocephalic.      Right Ear: Tympanic membrane normal.      Left Ear: Tympanic membrane normal.      Nose: Nose normal.      Mouth/Throat:      Pharynx: Oropharynx is clear.   Eyes:      General: No scleral icterus.     Pupils: Pupils are equal, round, and reactive to light.   Neck:      Vascular: No carotid bruit.   Cardiovascular:      Rate and Rhythm: Normal rate  and regular rhythm.      Heart sounds: No murmur heard.  Pulmonary:      Effort: Pulmonary effort is normal. No respiratory distress.      Breath sounds: Normal breath sounds.   Abdominal:      Palpations: Abdomen is soft.      Tenderness: There is no abdominal tenderness. There is no guarding.   Musculoskeletal:         General: No tenderness.      Cervical back: Neck supple.      Right lower leg: Edema present.      Left lower leg: Edema present.   Lymphadenopathy:      Cervical: No cervical adenopathy.   Skin:     General: Skin is warm.   Neurological:      General: No focal deficit present.      Mental Status: He is alert.      Cranial Nerves: No cranial nerve deficit.      Gait: Gait abnormal.   Psychiatric:         Mood and Affect: Mood normal.         Assessment/Plan   Problem List Items Addressed This Visit             ICD-10-CM    Hyperlipidemia E78.5    Relevant Medications    atorvastatin (Lipitor) 40 mg tablet    Other Relevant Orders    Comprehensive Metabolic Panel    Type 2 diabetes mellitus E11.9    Relevant Medications    metFORMIN (Glucophage) 1,000 mg tablet    pioglitazone (Actos) 15 mg tablet    Other Relevant Orders    POCT Albumin Random Urine manually resulted (Completed)    Comprehensive Metabolic Panel    Hemoglobin A1C    Essential hypertension I10    Relevant Medications    losartan (Cozaar) 100 mg tablet    hydroCHLOROthiazide (HYDRODiuril) 25 mg tablet    atorvastatin (Lipitor) 40 mg tablet    Other Relevant Orders    TSH with reflex to Free T4 if abnormal (Completed)    Comprehensive Metabolic Panel    Benign prostatic hyperplasia without urinary obstruction N40.0    Relevant Orders    Comprehensive Metabolic Panel    Anemia D64.9    Relevant Orders    CBC and Auto Differential    Vitamin B12    Morbid obesity (Multi) - Primary E66.01    Coronary arteriosclerosis I25.10    Relevant Medications    amLODIPine (Norvasc) 10 mg tablet    atorvastatin (Lipitor) 40 mg tablet    Other Relevant  Orders    Comprehensive Metabolic Panel    PVD (peripheral vascular disease) (CMS-HCC) I73.9    Relevant Orders    Comprehensive Metabolic Panel     Other Visit Diagnoses         Codes    Need for hepatitis C screening test     Z11.59    Hypertension, unspecified type     I10    Relevant Medications    amLODIPine (Norvasc) 10 mg tablet    spironolactone (Aldactone) 25 mg tablet    Other Relevant Orders    Comprehensive Metabolic Panel    BMI 40.0-44.9, adult (Multi)     Z68.41    Routine general medical examination at a health care facility     Z00.00    Benign prostatic hyperplasia, unspecified whether lower urinary tract symptoms present     N40.0    Relevant Medications    doxazosin (Cardura) 4 mg tablet    Urine frequency     R35.0    Relevant Orders    POCT UA Automated manually resulted (Completed)          Discussed blood work and wellness issues. Reviewed screenings and immunizations. Recommendations given

## 2024-11-16 DIAGNOSIS — E11.69 TYPE 2 DIABETES MELLITUS WITH OTHER SPECIFIED COMPLICATION, UNSPECIFIED WHETHER LONG TERM INSULIN USE (MULTI): ICD-10-CM

## 2024-11-16 DIAGNOSIS — I10 HYPERTENSION, UNSPECIFIED TYPE: ICD-10-CM

## 2024-11-18 ENCOUNTER — APPOINTMENT (OUTPATIENT)
Dept: DERMATOLOGY | Facility: CLINIC | Age: 79
End: 2024-11-18
Payer: COMMERCIAL

## 2024-11-22 RX ORDER — METFORMIN HYDROCHLORIDE 1000 MG/1
1000 TABLET ORAL 2 TIMES DAILY
Qty: 180 TABLET | Refills: 3 | OUTPATIENT
Start: 2024-11-22

## 2024-11-22 RX ORDER — AMLODIPINE BESYLATE 10 MG/1
10 TABLET ORAL DAILY
Qty: 90 TABLET | Refills: 3 | OUTPATIENT
Start: 2024-11-22

## 2024-11-22 RX ORDER — PIOGLITAZONEHYDROCHLORIDE 15 MG/1
15 TABLET ORAL DAILY
Qty: 90 TABLET | Refills: 3 | OUTPATIENT
Start: 2024-11-22

## 2024-11-25 ENCOUNTER — APPOINTMENT (OUTPATIENT)
Dept: DERMATOLOGY | Facility: CLINIC | Age: 79
End: 2024-11-25
Payer: COMMERCIAL

## 2024-11-25 VITALS — HEART RATE: 102 BPM | DIASTOLIC BLOOD PRESSURE: 83 MMHG | SYSTOLIC BLOOD PRESSURE: 164 MMHG

## 2024-11-25 DIAGNOSIS — D04.4 SQUAMOUS CELL CARCINOMA IN SITU (SCCIS) OF SCALP: ICD-10-CM

## 2024-11-25 PROCEDURE — 17311 MOHS 1 STAGE H/N/HF/G: CPT | Performed by: STUDENT IN AN ORGANIZED HEALTH CARE EDUCATION/TRAINING PROGRAM

## 2024-11-25 NOTE — PROGRESS NOTES
Office Visit Note  Date: 11/25/2024  Surgeon:  Ted Barroso MD  Office Location: [address]  [facility] [address]  Dept: [phone]  Dept Fax: [phone]  Referring Provider: Yang Laird MD  [address] [place] [address] Dr Luis Fernando Flannery [place], [facility] [address]  [place] [address]    Tammi Morataya is a 79 y.o. male who presents for the following: MOHS Surgery    According to the patient, the lesion has been present for approximately 6 months at the time of diagnosis.  The lesion is not causing symptoms.  The lesion has not been treated previously.    The patient does not have a pacemaker / defibrillator.  The patient does not have a heart valve / joint replacement.    The patient is on blood thinners.  The patient does not have a history of hepatitis B or C.  The patient does not have a history of HIV.  The patient does not have a history of immunosuppression (e.g. organ transplantation, malignancy, medications)    Review of Systems:  No other skin or systemic complaints other than what is documented elsewhere in the note.    MEDICAL HISTORY: clinically relevant history including significant past medical history, medications and allergies was reviewed and documented in Epic.    Objective   Well appearing patient in no apparent distress; mood and affect are within normal limits.  Vital signs: See record.  Noted on the Right Anterior Vertex Scalp  Is a 1.4 x 1.3 cm scar        The patient confirmed the identified site.    Discussion:  The nature of the diagnosis was explained. The lesion is a skin cancer.  It has a risk of local growth and distant spread. The condition is associated with sun exposure.  Warning signs of non-melanoma skin cancer discussed. Patient was instructed to perform monthly self skin examination.  We recommended that the patient have regular full skin exams given an increased risk of subsequent skin cancers. The patient was instructed to use sun  protective behaviors including use of broad spectrum sunscreens and sun protective clothing to reduce risk of skin cancers.      Risks, benefits, side effects of Mohs surgery were discussed with patient and the patient voiced understanding.  It was explained that even though the cure rate of Mohs is very high it is not 100%. Risks of surgery including but not limited to bleeding, infection, numbness, nerve damage, and scar were reviewed.  Discussion included wound care requirements, activity restrictions, likely scar outcome and time to heal.     After Mohs surgery, the defect may need to be repaired surgically and the scar may be longer than the original lesion.  Reconstruction options, risks, and benefits were reviewed including second intention healing, linear repair (4-1 ratio was explained), local flaps, skin grafts, cartilage grafts and interpolation flaps (the need for multiple surgeries was explained). Possible outcomes were reviewed including likely scar appearance, failure of flap survival, infection, bleeding and the need for revision surgery.     The pathology was reviewed, the photograph was reviewed, and the referring physician's note was reviewed.    Patient elected for Mohs surgery.

## 2024-11-25 NOTE — PROGRESS NOTES
Mohs Surgery Operative Note    Date of Surgery:  11/25/2024  Surgeon:  Ted Barroso MD  Office Location: [address]   [facility] [address]  [facility] [address]  Dept: [phone]  Dept Fax: [phone]  Referring Provider: Yang Laird MD  [address] [place] [address] Dr Luis Fernando Flannery [place], [place] [address]      Assessment/Plan   Pre-procedure:   Obtained informed consent: written from patient  The surgical site was identified with Dr. Laird and confirmed with the patient.     Intra-operative:   Audible time out called at : 10:25 AM 11/25/24  by: Lima Chopra RN   Verified patient name, birthdate, site, specimen bottle label & requisition.    The planned procedure(s) was again reviewed with the patient. The risks of bleeding, infection, nerve damage and scarring were reviewed. Written authorization was obtained. The patient identity, surgical site, and planned procedure(s) were verified. The provider acted as both surgeon and pathologist.     Squamous cell carcinoma in situ (SCCIS) of scalp  Right Anterior Vertex Scalp    Mohs surgery    Consent obtained: written    Universal Protocol:  Procedure explained and questions answered to patient or proxy's satisfaction: Yes    Test results available and properly labeled: Yes    Pathology report reviewed: Yes    External notes reviewed: Yes    Photo or diagram used for site identification: Yes    Site/side marked: Yes    Slide independently reviewed by Mohs surgeon: Yes    Immediately prior to procedure a time out was called: Yes    Patient identity confirmed: verbally with patient  Preparation: Patient was prepped and draped in usual sterile fashion      Anticoagulation:  Is the patient taking prescription anticoagulant and/or aspirin prescribed/recommended by a physician? Yes    Was the anticoagulation regimen changed prior to Mohs? No      Anesthesia:  Anesthesia method: local infiltration  Local anesthetic: lidocaine 1% WITH  epi    Procedure Details:  Case ID Number: [id number]  Biopsy accession number: [id number]  Date of biopsy: 8/28/2024  Pre-Op diagnosis: squamous cell carcinoma  SCC subtype: in situ  Surgical site (from skin exam): Right Anterior Vertex Scalp  Pre-operative length (cm): 1.4  Pre-operative width (cm): 1.3  Indications for Mohs surgery: anatomic location where tissue conservation is critical  Previously treated? No      Micrographic Surgery Details:  Post-operative length (cm): 1.5  Post-operative width (cm): 1.5  Number of Mohs stages: 1    Stage 1     Comments: The patient was brought into the operating room and placed in the procedure chair in the appropriate position.  The area positive by previous biopsy was identified and confirmed with the patient. The area of clinically obvious tumor was debulked using a curette and/or scalpel as needed. An incision was made following the Mohs approach through the skin. The specimen was taken to the lab, divided into 2 piece(s) and appropriately chromacoded and processed.     Tumor features identified on Mohs section: no tumor identified    Depth of defect: subcutaneous fat    Patient tolerance of procedure: tolerated well, no immediate complications    Reconstruction:  Was the defect reconstructed?: No     Repair: After a discussion with the patient regarding the options for wound closure, a decision was made to proceed with second intention healing.    Dressing/Follow-up: Surgifoam was placed in the wound. A pressure dressing was placed to help stabilize the wound and to minimize the risk of postoperative bleeding. Wound care was discussed, and the patient was given written post-operative wound care instructions.    Outcome: patient tolerated procedure well with no complications    Post-procedure details: sterile dressing applied and wound care instructions given    Dressing type: pressure dressing and Gelfoam      Staff Communication: Dermatology Local Anesthesia: 1 %  Lidocaine / Epinephrine - Amount: 1.4ml            Wound care was discussed, and the patient was given written post-operative wound care instructions.      The patient will follow up with Ted Barroso MD as needed for any post operative problems or concerns, and will follow up with their primary dermatologist as scheduled.

## 2024-12-05 DIAGNOSIS — I10 HYPERTENSION, UNSPECIFIED TYPE: ICD-10-CM

## 2024-12-05 RX ORDER — SPIRONOLACTONE 25 MG/1
25 TABLET ORAL DAILY
Qty: 90 TABLET | Refills: 3 | OUTPATIENT
Start: 2024-12-05

## 2024-12-19 DIAGNOSIS — E78.5 HYPERLIPIDEMIA, UNSPECIFIED HYPERLIPIDEMIA TYPE: ICD-10-CM

## 2024-12-19 DIAGNOSIS — I25.10 CORONARY ARTERIOSCLEROSIS: ICD-10-CM

## 2024-12-19 DIAGNOSIS — I10 ESSENTIAL HYPERTENSION: ICD-10-CM

## 2024-12-23 RX ORDER — ATORVASTATIN CALCIUM 40 MG/1
40 TABLET, FILM COATED ORAL DAILY
Qty: 90 TABLET | Refills: 3 | OUTPATIENT
Start: 2024-12-23

## 2025-01-22 NOTE — PROGRESS NOTES
Virtual or Telephone Consent    An interactive audio and video telecommunication system which permits real time communications between the patient (at the originating site) and provider (at the distant site) was utilized to provide this telehealth service.   Verbal consent was requested and obtained from Tomas Morataya on this date, 01/23/25 for a telehealth visit.   FUV    Last visit - 5/10/24     HISTORY OF PRESENT ILLNESS:   Tomas Morataya is a 79 y.o. male who is being seen today for PSA results  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   -ED on Viagra  PAST MEDICAL HISTORY:  Past Medical History:   Diagnosis Date    Allergy to other foods     History of food allergy    Essential (primary) hypertension     Benign essential hypertension    Personal history of other endocrine, nutritional and metabolic disease     History of hyperlipidemia    Personal history of other endocrine, nutritional and metabolic disease     History of diabetes mellitus       PAST SURGICAL HISTORY:  Past Surgical History:   Procedure Laterality Date    OTHER SURGICAL HISTORY  08/29/2019    Cyst excision    OTHER SURGICAL HISTORY  08/29/2019    Heart surgery        ALLERGIES:   Allergies   Allergen Reactions    Cefaclor Anaphylaxis and Swelling    Cocoa Hives     Chocolate    Penicillins Swelling and Rash     FACIAL SWELLING        MEDICATIONS:   Current Outpatient Medications   Medication Instructions    amLODIPine (NORVASC) 10 mg, oral, Daily    aspirin 81 mg EC tablet 1 tablet, oral, Daily    atorvastatin (LIPITOR) 40 mg, oral, Daily    cyanocobalamin, vitamin B-12, (Vitamin B-12) 1,000 mcg tablet extended release 1 tablet, oral, Daily    doxazosin (CARDURA) 4 mg, oral, Daily    folic acid (Folvite) 1 mg tablet 1 tablet, oral, Daily    hydroCHLOROthiazide (HYDRODIURIL) 25 mg, oral, Daily    loratadine (Claritin) 10 mg tablet 1 tablet, oral, Daily    losartan (COZAAR) 100 mg, oral, Daily    metFORMIN  "(GLUCOPHAGE) 1,000 mg, oral, 2 times daily    multivitamin tablet oral    pioglitazone (ACTOS) 15 mg, oral, Daily    sildenafil (Viagra) 50 mg tablet 2 tablets, oral, As needed, Before intercourse    spironolactone (ALDACTONE) 25 mg, oral, Daily    triamcinolone (Nasacort) 55 mcg nasal inhaler 1-2 sprays, Each Nostril, Daily        PHYSICAL EXAM:  There were no vitals taken for this visit.  Constitutional: Patient appears well-developed and well-nourished. No distress.    Pulmonary/Chest: Effort normal. No respiratory distress.   Abdominal: Soft, ND NT  : fullness of the right testicle  Musculoskeletal: Normal range of motion.    Neurological: Alert and oriented to person, place, and time.  Psychiatric: Normal mood and affect. Behavior is normal. Thought content normal.      Labs:  No results found for: \"TESTOSTERONE\"  Lab Results   Component Value Date    PSA 1.73 10/04/2024     No components found for: \"CBC\"  Lab Results   Component Value Date    CREATININE 0.98 10/04/2024     No components found for: \"TESTOTMS\"  No results found for: \"TESTF\"    Imaging: Scrotal US on 5/1/24 demonstrated No sign of intrauterine mass or torsion.  Large left hydrocele with some internal debris and internal septations. Underlying infection can not be excluded.  3 mm left epididymal head cyst.    Discussion:  He had recurrence of left hydrocele which has become bothersome. Had prior repair in March 2023. Notes he has been wearing tight underwear for scrotal support. Will proceed with left hydrocelectomy on 2/24/25.       Assessment:      No diagnosis found.      Tomas Morataya is a 79 y.o. male here for FUV     Plan:   1) left hydrocelectomy on 2/24/25  All questions and concerns were addressed. Patient verbalizes understanding and has no other questions at this time.     Scribe Attestation  By signing my name below, IAliyah Scribe   attest that this documentation has been prepared under the direction and in the presence " of Marc Red MD.

## 2025-01-23 ENCOUNTER — TELEMEDICINE (OUTPATIENT)
Dept: UROLOGY | Facility: HOSPITAL | Age: 80
End: 2025-01-23
Payer: COMMERCIAL

## 2025-01-23 DIAGNOSIS — N43.3 HYDROCELE, UNSPECIFIED HYDROCELE TYPE: Primary | ICD-10-CM

## 2025-01-23 PROCEDURE — 99214 OFFICE O/P EST MOD 30 MIN: CPT | Performed by: UROLOGY

## 2025-01-23 PROCEDURE — G2211 COMPLEX E/M VISIT ADD ON: HCPCS | Performed by: UROLOGY

## 2025-01-24 PROBLEM — N43.3 HYDROCELE: Status: ACTIVE | Noted: 2025-01-23

## 2025-01-24 RX ORDER — ONDANSETRON 4 MG/1
4 TABLET, ORALLY DISINTEGRATING ORAL ONCE
OUTPATIENT
Start: 2025-01-24 | End: 2025-01-24

## 2025-01-24 RX ORDER — CIPROFLOXACIN 2 MG/ML
400 INJECTION, SOLUTION INTRAVENOUS ONCE
OUTPATIENT
Start: 2025-01-24 | End: 2025-01-24

## 2025-01-25 ENCOUNTER — TELEPHONE VISIT (OUTPATIENT)
Dept: PRIMARY CARE | Facility: CLINIC | Age: 80
End: 2025-01-25
Payer: COMMERCIAL

## 2025-01-25 ENCOUNTER — TELEPHONE (OUTPATIENT)
Dept: PRIMARY CARE | Facility: CLINIC | Age: 80
End: 2025-01-25

## 2025-01-25 DIAGNOSIS — U07.1 COVID: Primary | ICD-10-CM

## 2025-01-25 NOTE — PROGRESS NOTES
Subjective   Patient ID: Tomas Morataya is a 79 y.o. male who presents for No chief complaint on file..    HPI     Review of Systems    Objective   There were no vitals taken for this visit.    Physical Exam    Assessment/Plan

## 2025-01-26 NOTE — PROGRESS NOTES
Virtual or Telephone Consent(late entry)    A telephone visit (audio only) between the patient (at the originating site) and the provider (at the distant site) was utilized to provide this telehealth service.   Verbal consent was requested and obtained from Tomas Morataya on this date, 01/25/25 for a telehealth visit.      Pt c/o cough and congestion. Wife with COVID. Pt had neg test Thursday and Friday. +this AM. OTC meds helping. Low grade Fever initially. No HA/dizziness/CP/SOB/N/V/D. Some fatigue. Would like to start paxlovid. No recent travel or abx .    Plan: start paxlovid. Discussed side effects and med interactions. Rec pt hold statin while on med.     Go to the ER if any of your symptoms are significantly worsening.     Return as scheduled, sooner if needed    Televisit: 6:30min

## 2025-02-14 ENCOUNTER — ANESTHESIA EVENT (OUTPATIENT)
Dept: OPERATING ROOM | Facility: HOSPITAL | Age: 80
End: 2025-02-14
Payer: COMMERCIAL

## 2025-02-14 ENCOUNTER — PRE-ADMISSION TESTING (OUTPATIENT)
Dept: PREADMISSION TESTING | Facility: HOSPITAL | Age: 80
End: 2025-02-14
Payer: COMMERCIAL

## 2025-02-14 ENCOUNTER — APPOINTMENT (OUTPATIENT)
Dept: UROLOGY | Facility: HOSPITAL | Age: 80
End: 2025-02-14
Payer: COMMERCIAL

## 2025-02-14 VITALS
BODY MASS INDEX: 40.81 KG/M2 | OXYGEN SATURATION: 95 % | WEIGHT: 253.9 LBS | HEART RATE: 116 BPM | TEMPERATURE: 98.1 F | SYSTOLIC BLOOD PRESSURE: 117 MMHG | HEIGHT: 66 IN | DIASTOLIC BLOOD PRESSURE: 74 MMHG

## 2025-02-14 DIAGNOSIS — N40.0 BENIGN PROSTATIC HYPERPLASIA WITHOUT URINARY OBSTRUCTION: ICD-10-CM

## 2025-02-14 DIAGNOSIS — Z41.9 SURGERY, ELECTIVE: ICD-10-CM

## 2025-02-14 DIAGNOSIS — E78.5 HYPERLIPIDEMIA, UNSPECIFIED HYPERLIPIDEMIA TYPE: ICD-10-CM

## 2025-02-14 DIAGNOSIS — N43.3 HYDROCELE, UNSPECIFIED HYDROCELE TYPE: ICD-10-CM

## 2025-02-14 DIAGNOSIS — E11.69 TYPE 2 DIABETES MELLITUS WITH OTHER SPECIFIED COMPLICATION, UNSPECIFIED WHETHER LONG TERM INSULIN USE (MULTI): ICD-10-CM

## 2025-02-14 DIAGNOSIS — Z01.810 PREOPERATIVE CARDIOVASCULAR EXAMINATION: ICD-10-CM

## 2025-02-14 DIAGNOSIS — I25.10 CORONARY ARTERIOSCLEROSIS: ICD-10-CM

## 2025-02-14 DIAGNOSIS — Z01.818 PREOPERATIVE CLEARANCE: ICD-10-CM

## 2025-02-14 DIAGNOSIS — I10 HYPERTENSION, UNSPECIFIED TYPE: ICD-10-CM

## 2025-02-14 DIAGNOSIS — I10 ESSENTIAL HYPERTENSION: ICD-10-CM

## 2025-02-14 DIAGNOSIS — I73.9 PVD (PERIPHERAL VASCULAR DISEASE) (CMS-HCC): ICD-10-CM

## 2025-02-14 DIAGNOSIS — Z01.811 PREOPERATIVE RESPIRATORY EXAMINATION: Primary | ICD-10-CM

## 2025-02-14 DIAGNOSIS — D51.9 ANEMIA DUE TO VITAMIN B12 DEFICIENCY, UNSPECIFIED B12 DEFICIENCY TYPE: ICD-10-CM

## 2025-02-14 LAB
ALBUMIN SERPL BCP-MCNC: 3.6 G/DL (ref 3.4–5)
ALP SERPL-CCNC: 54 U/L (ref 33–136)
ALT SERPL W P-5'-P-CCNC: 21 U/L (ref 10–52)
ANION GAP SERPL CALC-SCNC: 15 MMOL/L (ref 10–20)
AST SERPL W P-5'-P-CCNC: 19 U/L (ref 9–39)
BASOPHILS # BLD AUTO: 0.03 X10*3/UL (ref 0–0.1)
BASOPHILS NFR BLD AUTO: 0.4 %
BILIRUB SERPL-MCNC: 0.5 MG/DL (ref 0–1.2)
BUN SERPL-MCNC: 18 MG/DL (ref 6–23)
CALCIUM SERPL-MCNC: 9.1 MG/DL (ref 8.6–10.6)
CHLORIDE SERPL-SCNC: 101 MMOL/L (ref 98–107)
CO2 SERPL-SCNC: 25 MMOL/L (ref 21–32)
CREAT SERPL-MCNC: 0.81 MG/DL (ref 0.5–1.3)
EGFRCR SERPLBLD CKD-EPI 2021: >90 ML/MIN/1.73M*2
EOSINOPHIL # BLD AUTO: 0.04 X10*3/UL (ref 0–0.4)
EOSINOPHIL NFR BLD AUTO: 0.6 %
ERYTHROCYTE [DISTWIDTH] IN BLOOD BY AUTOMATED COUNT: 14.4 % (ref 11.5–14.5)
EST. AVERAGE GLUCOSE BLD GHB EST-MCNC: 151 MG/DL
GLUCOSE SERPL-MCNC: 137 MG/DL (ref 74–99)
HBA1C MFR BLD: 6.9 %
HCT VFR BLD AUTO: 41.1 % (ref 41–52)
HGB BLD-MCNC: 13.5 G/DL (ref 13.5–17.5)
IMM GRANULOCYTES # BLD AUTO: 0.02 X10*3/UL (ref 0–0.5)
IMM GRANULOCYTES NFR BLD AUTO: 0.3 % (ref 0–0.9)
LYMPHOCYTES # BLD AUTO: 1.3 X10*3/UL (ref 0.8–3)
LYMPHOCYTES NFR BLD AUTO: 18.2 %
MCH RBC QN AUTO: 30 PG (ref 26–34)
MCHC RBC AUTO-ENTMCNC: 32.8 G/DL (ref 32–36)
MCV RBC AUTO: 91 FL (ref 80–100)
MONOCYTES # BLD AUTO: 0.61 X10*3/UL (ref 0.05–0.8)
MONOCYTES NFR BLD AUTO: 8.5 %
NEUTROPHILS # BLD AUTO: 5.15 X10*3/UL (ref 1.6–5.5)
NEUTROPHILS NFR BLD AUTO: 72 %
NRBC BLD-RTO: 0 /100 WBCS (ref 0–0)
PLATELET # BLD AUTO: 239 X10*3/UL (ref 150–450)
POTASSIUM SERPL-SCNC: 4.7 MMOL/L (ref 3.5–5.3)
PROT SERPL-MCNC: 6 G/DL (ref 6.4–8.2)
RBC # BLD AUTO: 4.5 X10*6/UL (ref 4.5–5.9)
SODIUM SERPL-SCNC: 136 MMOL/L (ref 136–145)
VIT B12 SERPL-MCNC: 686 PG/ML (ref 211–911)
WBC # BLD AUTO: 7.2 X10*3/UL (ref 4.4–11.3)

## 2025-02-14 PROCEDURE — 82607 VITAMIN B-12: CPT

## 2025-02-14 PROCEDURE — 36415 COLL VENOUS BLD VENIPUNCTURE: CPT

## 2025-02-14 PROCEDURE — 80053 COMPREHEN METABOLIC PANEL: CPT

## 2025-02-14 PROCEDURE — 87081 CULTURE SCREEN ONLY: CPT

## 2025-02-14 PROCEDURE — 83036 HEMOGLOBIN GLYCOSYLATED A1C: CPT

## 2025-02-14 PROCEDURE — 99205 OFFICE O/P NEW HI 60 MIN: CPT | Performed by: ANESTHESIOLOGY

## 2025-02-14 PROCEDURE — 85025 COMPLETE CBC W/AUTO DIFF WBC: CPT

## 2025-02-14 RX ORDER — DOBUTAMINE HYDROCHLORIDE 100 MG/100ML
5-40 INJECTION INTRAVENOUS CONTINUOUS
Status: CANCELLED | OUTPATIENT
Start: 2025-02-14

## 2025-02-14 RX ORDER — CHLORHEXIDINE GLUCONATE 40 MG/ML
1 SOLUTION TOPICAL DAILY
Qty: 25 ML | Refills: 0 | Status: SHIPPED | OUTPATIENT
Start: 2025-02-14 | End: 2025-02-19

## 2025-02-14 RX ORDER — ATROPINE SULFATE 0.1 MG/ML
.25-5 INJECTION INTRAVENOUS ONCE AS NEEDED
Status: CANCELLED | OUTPATIENT
Start: 2025-02-14

## 2025-02-14 RX ORDER — ACETAMINOPHEN 500 MG
650 TABLET ORAL EVERY 6 HOURS PRN
COMMUNITY

## 2025-02-14 RX ORDER — CHLORHEXIDINE GLUCONATE ORAL RINSE 1.2 MG/ML
15 SOLUTION DENTAL DAILY
Qty: 30 ML | Refills: 0 | Status: SHIPPED | OUTPATIENT
Start: 2025-02-14 | End: 2025-02-16

## 2025-02-14 ASSESSMENT — ENCOUNTER SYMPTOMS
CONSTITUTIONAL NEGATIVE: 1
JOINT SWELLING: 1
PALPITATIONS: 1
EYES NEGATIVE: 1
NEUROLOGICAL NEGATIVE: 1
DYSPNEA WITH EXERTION: 1
NECK NEGATIVE: 1

## 2025-02-14 ASSESSMENT — DUKE ACTIVITY SCORE INDEX (DASI)
CAN YOU PARTICIPATE IN MODERATE RECREATIONAL ACTIVITIES LIKE GOLF, BOWLING, DANCING, DOUBLES TENNIS OR THROWING A BASEBALL OR FOOTBALL: NO
CAN YOU TAKE CARE OF YOURSELF (EAT, DRESS, BATHE, OR USE TOILET): YES
CAN YOU WALK INDOORS, SUCH AS AROUND YOUR HOUSE: YES
CAN YOU CLIMB A FLIGHT OF STAIRS OR WALK UP A HILL: YES
CAN YOU WALK A BLOCK OR TWO ON LEVEL GROUND: YES
CAN YOU HAVE SEXUAL RELATIONS: NO
CAN YOU DO HEAVY WORK AROUND THE HOUSE LIKE SCRUBBING FLOORS OR LIFTING AND MOVING HEAVY FURNITURE: NO
CAN YOU DO LIGHT WORK AROUND THE HOUSE LIKE DUSTING OR WASHING DISHES: YES
TOTAL_SCORE: 18.95
CAN YOU DO MODERATE WORK AROUND THE HOUSE LIKE VACUUMING, SWEEPING FLOORS OR CARRYING GROCERIES: YES
CAN YOU PARTICIPATE IN STRENOUS SPORTS LIKE SWIMMING, SINGLES TENNIS, FOOTBALL, BASKETBALL, OR SKIING: NO
CAN YOU DO YARD WORK LIKE RAKING LEAVES, WEEDING OR PUSHING A MOWER: NO
DASI METS SCORE: 5.1
CAN YOU RUN A SHORT DISTANCE: NO

## 2025-02-14 ASSESSMENT — LIFESTYLE VARIABLES: SMOKING_STATUS: NONSMOKER

## 2025-02-14 NOTE — H&P (VIEW-ONLY)
CPM/PAT Evaluation       Name: Tomas Morataya (Tomas Morataya)  /Age: 1945/79 y.o.     In-Person       Chief Complaint: Lt Hydrocelectomy     HPI  A 78 y/o M scheduled for Lt Hydrocelectomy  on 2025 with Dr. Red.      PMHX includes:  - HTN  - HLD  - Elevate Ca Score, on ASA & Statin   - DM  - BPH   - Anemia     Presents to [facility] today for perioperative risk stratification and optimization.      Past Medical History:   Diagnosis Date    Allergy to other foods     History of food allergy    Essential (primary) hypertension     Benign essential hypertension    Personal history of other endocrine, nutritional and metabolic disease     History of hyperlipidemia    Personal history of other endocrine, nutritional and metabolic disease     History of diabetes mellitus       Past Surgical History:   Procedure Laterality Date    OTHER SURGICAL HISTORY  2019    Cyst excision    OTHER SURGICAL HISTORY  2019    Heart surgery       Patient  has no history on file for sexual activity.    Family History   Problem Relation Name Age of Onset    Coronary artery disease Mother      Hypertension Mother      Breast cancer Mother      Diabetes type II Mother         Allergies   Allergen Reactions    Cefaclor Anaphylaxis and Swelling    Cocoa Hives     Chocolate    Penicillins Swelling and Rash     FACIAL SWELLING       Prior to Admission medications    Medication Sig Start Date End Date Taking? Authorizing Provider   amLODIPine (Norvasc) 10 mg tablet Take 1 tablet (10 mg) by mouth once daily. 10/7/24 4/5/25  Raquel Galvez DO   aspirin 81 mg EC tablet Take 1 tablet (81 mg) by mouth once daily.    Historical Provider, MD   atorvastatin (Lipitor) 40 mg tablet Take 1 tablet (40 mg) by mouth once daily. 10/7/24 4/5/25  Raquel Galvez DO   cyanocobalamin, vitamin B-12, (Vitamin B-12) 1,000 mcg tablet extended release Take 1 tablet (1,000 mcg) by mouth once daily.    Historical Provider, MD   doxazosin (Cardura) 4  mg tablet Take 1 tablet (4 mg) by mouth once daily. 10/7/24   Raquel Galvez DO   folic acid (Folvite) 1 mg tablet Take 1 tablet (1 mg) by mouth once daily.    Historical Provider, MD   hydroCHLOROthiazide (HYDRODiuril) 25 mg tablet Take 1 tablet (25 mg) by mouth once daily. 10/7/24 4/5/25  Raquel Galvez DO   loratadine (Claritin) 10 mg tablet Take 1 tablet (10 mg) by mouth once daily.    Historical Provider, MD   losartan (Cozaar) 100 mg tablet Take 1 tablet (100 mg) by mouth once daily. 10/7/24 4/5/25  Raquel Galvez DO   metFORMIN (Glucophage) 1,000 mg tablet Take 1 tablet (1,000 mg) by mouth 2 times a day. 10/7/24 4/5/25  Raquel Galvez DO   multivitamin tablet Take by mouth.    Historical Provider, MD   pioglitazone (Actos) 15 mg tablet Take 1 tablet (15 mg) by mouth once daily. 10/7/24 4/5/25  Raquel Galvez DO   sildenafil (Viagra) 50 mg tablet Take 2 tablets (100 mg) by mouth if needed. Before intercourse 12/20/21   Historical Provider, MD   spironolactone (Aldactone) 25 mg tablet Take 1 tablet (25 mg) by mouth once daily. 10/7/24 4/5/25  Raquel Galvez DO   triamcinolone (Nasacort) 55 mcg nasal inhaler Administer 1-2 sprays into each nostril once daily.    Historical Provider, MD MORALES ROS:   Constitutional:   neg    Neuro/Psych:   neg    Eyes:   neg    Ears:   neg    Nose:   Mouth:   Throat:   neg    Neck:   neg    Cardio:    palpitations   peripheral edema   JJ  Respiratory:   Endocrine:   GI:   :   Musculoskeletal:    swelling  Hematologic:   Skin:  neg        Physical Exam  Vitals reviewed.   Constitutional:       Appearance: He is obese.   Eyes:      Extraocular Movements: Extraocular movements intact.      Pupils: Pupils are equal, round, and reactive to light.   Cardiovascular:      Pulses: Normal pulses.   Pulmonary:      Effort: Pulmonary effort is normal.      Breath sounds: Normal breath sounds.   Abdominal:      General: Abdomen is flat. Bowel sounds are normal. There is no  "distension.      Palpations: Abdomen is soft.      Tenderness: There is no abdominal tenderness.   Musculoskeletal:      Right lower leg: Edema present.      Left lower leg: Edema present.   Neurological:      Mental Status: He is alert and oriented to person, place, and time.   Psychiatric:         Thought Content: Thought content normal.          PAT AIRWAY:   Airway:     Mallampati::  I    Neck ROM::  Full  normal        Testing/Diagnostic:     Patient Specialist/PCP:     Visit Vitals  /74   Pulse (!) 116   Temp 36.7 °C (98.1 °F) (Oral)   Ht 1.676 m (5' 6\")   Wt 115 kg (253 lb 14.4 oz)   SpO2 95%   BMI 40.98 kg/m²   Smoking Status Never   BSA 2.31 m²       DASI Risk Score      Flowsheet Row Pre-Admission Testing from 2/14/2025 in [facility]   Can you take care of yourself (eat, dress, bathe, or use toilet)?  2.75 filed at 02/14/2025 1303   Can you walk indoors, such as around your house? 1.75 filed at 02/14/2025 1303   Can you walk a block or two on level ground?  2.75 filed at 02/14/2025 1303   Can you climb a flight of stairs or walk up a hill? 5.5 filed at 02/14/2025 1303   Can you run a short distance? 0 filed at 02/14/2025 1303   Can you do light work around the house like dusting or washing dishes? 2.7 filed at 02/14/2025 1303   Can you do moderate work around the house like vacuuming, sweeping floors or carrying groceries? 3.5 filed at 02/14/2025 1303   Can you do heavy work around the house like scrubbing floors or lifting and moving heavy furniture?  0 filed at 02/14/2025 1303   Can you do yard work like raking leaves, weeding or pushing a mower? 0 filed at 02/14/2025 1303   Can you have sexual relations? 0 filed at 02/14/2025 1303   Can you participate in moderate recreational activities like golf, bowling, dancing, doubles tennis or throwing a baseball or football? 0 filed at 02/14/2025 1303   Can you participate in strenous sports like swimming, singles tennis, football, " basketball, or skiing? 0 filed at 02/14/2025 1303   DASI SCORE 18.95 filed at 02/14/2025 1303   METS Score (Will be calculated only when all the questions are answered) 5.1 filed at 02/14/2025 1303          Caprini DVT Assessment      Flowsheet Row Pre-Admission Testing from 2/14/2025 in [facility]   DVT Score (IF A SCORE IS NOT CALCULATING, MUST SELECT A BMI TO COMPLETE) 7 filed at 02/14/2025 1324   Surgical Factors Minor surgery planned filed at 02/14/2025 1324   BMI (BMI MUST BE CHOSEN) 41-50 (Morbid obesity) filed at 02/14/2025 1324          Modified Frailty Index      Flowsheet Row Pre-Admission Testing from 2/14/2025 in [facility]   Non-independent functional status (problems with dressing, bathing, personal grooming, or cooking) 0 filed at 02/14/2025 1326   History of diabetes mellitus  0.0909 filed at 02/14/2025 1326   History of COPD 0 filed at 02/14/2025 1326   History of CHF No filed at 02/14/2025 1326   History of MI 0 filed at 02/14/2025 1326   History of Percutaneous Coronary Intervention, Cardiac Surgery, or Angina No filed at 02/14/2025 1326   Hypertension requiring the use of medication  0.0909 filed at 02/14/2025 1326   Peripheral vascular disease 0 filed at 02/14/2025 1326   Impaired sensorium (cognitive impairement or loss, clouding, or delirium) 0 filed at 02/14/2025 1326   TIA or CVA withouy residual deficit 0 filed at 02/14/2025 1326   Cerebrovascular accident with deficit 0 filed at 02/14/2025 1326   Modified Frailty Index Calculator .1818 filed at 02/14/2025 1326          CHADS2 Stroke Risk  Current as of about an hour ago        N/A 3 to 100%: High Risk   2 to < 3%: Medium Risk   0 to < 2%: Low Risk     Last Change: N/A          This score determines the patient's risk of having a stroke if the patient has atrial fibrillation.        This score is not applicable to this patient. Components are not calculated.          Revised Cardiac Risk Index       Flowsheet Row Pre-Admission Testing from 2/14/2025 in [facility]   High-Risk Surgery (Intraperitoneal, Intrathoracic,Suprainguinal vascular) 0 filed at 02/14/2025 1325   History of ischemic heart disease (History of MI, History of positive exercuse test, Current chest paint considered due to myocardial ischemia, Use of nitrate therapy, ECG with pathological Q Waves) 0 filed at 02/14/2025 1325   History of congestive heart failure (pulmonary edemia, bilateral rales or S3 gallop, Paroxysmal nocturnal dyspnea, CXR showing pulmonary vascular redistribution) 0 filed at 02/14/2025 1325   History of cerebrovascular disease (Prior TIA or stroke) 0 filed at 02/14/2025 1325   Pre-operative insulin treatment 0 filed at 02/14/2025 1325   Pre-operative creatinine>2 mg/dl 0 filed at 02/14/2025 1325   Revised Cardiac Risk Calculator 0 filed at 02/14/2025 1325          Apfel Simplified Score      Flowsheet Row Pre-Admission Testing from 2/14/2025 in [facility]   Smoking status 1 filed at 02/14/2025 1325   History of motion sickness or PONV  0 filed at 02/14/2025 1325   Use of postoperative opioids 0 filed at 02/14/2025 1325   Gender - Female 0=No filed at 02/14/2025 1325   Apfel Simplified Score Calculator 1 filed at 02/14/2025 1325          Risk Analysis Index Results This Encounter    No data found in the last 10 encounters.       Stop Bang Score      Flowsheet Row Pre-Admission Testing from 2/14/2025 in [facility]   Do you snore loudly? 1 filed at 02/14/2025 1302   Do you often feel tired or fatigued after your sleep? 1 filed at 02/14/2025 1302   Has anyone ever observed you stop breathing in your sleep? 1 filed at 02/14/2025 1302   Do you have or are you being treated for high blood pressure? 1 filed at 02/14/2025 1302   Recent BMI (Calculated) 42 filed at 02/14/2025 1302   Is BMI greater than 35 kg/m2? 1=Yes filed at 02/14/2025 1302   Age older than 50 years old? 1=Yes  filed at 02/14/2025 1302   Is your neck circumference greater than 17 inches (Male) or 16 inches (Female)? 1 filed at 02/14/2025 1302   Gender - Male 1=Yes filed at 02/14/2025 1302   STOP-BANG Total Score 8 filed at 02/14/2025 1302          Prodigy: High Risk  Total Score: 20              Prodigy Age Score      Prodigy Gender Score          ARISCAT Score for Postoperative Pulmonary Complications      Flowsheet Row Pre-Admission Testing from 2/14/2025 in [facility]   Age Calculated Score 3 filed at 02/14/2025 1326   Preoperative SpO2 0 filed at 02/14/2025 1326   Respiratory infection in the last month Either upper or lower (i.e., URI, bronchitis, pneumonia), with fever and antibiotic treatment 0 filed at 02/14/2025 1326   Preoperative anemia (Hgb less than 10 g/dl) 0 filed at 02/14/2025 1326   Surgical incision  0 filed at 02/14/2025 1326   Duration of surgery  0 filed at 02/14/2025 1326   Emergency Procedure  0 filed at 02/14/2025 1326   ARISCAT Total Score  3 filed at 02/14/2025 1326          Calvo Perioperative Risk for Myocardial Infarction or Cardiac Arrest (MARITA)      Flowsheet Row Pre-Admission Testing from 2/14/2025 in [facility]   Calculated Age Score 1.54 filed at 02/14/2025 1326   Functional Status  0 filed at 02/14/2025 1326   ASA Class  -1.92 filed at 02/14/2025 1326   Creatinine 0 filed at 02/14/2025 1326   Type of Procedure  -0.26 filed at 02/14/2025 1326   MARITA Total Score  -5.89 filed at 02/14/2025 1326   MARITA % 0.28 filed at 02/14/2025 1326            Assessment and Plan:     No results found for this or any previous visit (from the past 24 hours).     Assessment and Plan:    A 80 y/o M scheduled for Lt Hydrocelectomy  on 2/24/2025 with Dr. Red.      PMHX includes:  - HTN  - HLD  - Elevate Ca Score, on ASA & Statin   - DM  - BPH   - Anemia     Presents to CPM today for perioperative risk stratification and optimization.    Neuro:  No neurologic diagnosis,  however, the patient is at increased risk for perioperative delirium secondary to  age, sensory impairment, polypharmacy  Patient is at increased risk for perioperative CVA secondary to  HTN, DM, increased age    HEENT:  No HEENT diagnosis or significant findings on chart review or clinical presentation and evaluation. No further preoperative testing/intervention indicated at this time.    Cardiovascular:    - HTN  - HLD  - Elevate Ca Score, on ASA & Statin, will get Stress Echo given that the pt reports exertional SOB and elevated Ca score   METS: 2.1  RCRI: 0 points, 3.9%  risk for postoperative MACE   MARITA: 0.28% risk for 30 day postoperative MACE    - CT Heart Ca 12/2022:   Coronary artery calcium score of 840.8*.     Echo 1/2022:   1. The left ventricular systolic function is normal.   2. Spectral Doppler shows an impaired relaxation pattern of left ventricular diastolic filling.   3. There is no evidence of cardiac tamponade.    Pulmonary:  No pulmonary diagnosis, however patient is at increased risk of perioperative complications secondary to  age > 60, obesity, types of anesthetic  Stop Bang score is 8 placing patient at high risk for ALBERTA  ARISCAT: <26 points, 1.6% risk of in-hospital postoperative pulmonary complication  PRODIGY: High risk for opioid induced respiratory depression  Pumonary toilet education discussed, patient also provided deep breathing exercises and incentive spirometry educational handout    Renal:   - BPH  Age equal to or greater than 56, BMI equal to or greater than 30, HTN    Endocrine:  - DM    Hematologic:  - Anemia, will get updated CBC   - Caprini Score 7, patient at High risk for perioperative DVT.  Patient provided with VTE education/handout.    Gastrointestinal:   No GI diagnosis or significant findings on chart review or clinical presentation and evaluation.   Apfel 1    Infectious disease:   No infectious diagnosis or significant findings on chart review or clinical  presentation and evaluation.   Prescription provided for CHG body wash and dental rinse. CHG use instructions reviewed and provided to patient.  Staph screen collected    Musculoskeletal:   No diagnosis or significant findings on chart review or clinical presentation and evaluation.     Anesthesia/Airway:  No anesthesia complications      Medication instructions and NPO guidelines reviewed with the patient.  All questions or concerns discussed and addressed.      Patient seen and staffed with Dr. Watson

## 2025-02-14 NOTE — CPM/PAT H&P
CPM/PAT Evaluation       Name: Tomas Morataya (Tomas Morataya)  /Age: 1945/79 y.o.     In-Person       Chief Complaint: Lt Hydrocelectomy     HPI  A 78 y/o M scheduled for Lt Hydrocelectomy  on 2025 with Dr. Red.      PMHX includes:  - HTN  - HLD  - Elevate Ca Score, on ASA & Statin   - DM  - BPH   - Anemia     Presents to [facility] today for perioperative risk stratification and optimization.      Past Medical History:   Diagnosis Date    Allergy to other foods     History of food allergy    Essential (primary) hypertension     Benign essential hypertension    Personal history of other endocrine, nutritional and metabolic disease     History of hyperlipidemia    Personal history of other endocrine, nutritional and metabolic disease     History of diabetes mellitus       Past Surgical History:   Procedure Laterality Date    OTHER SURGICAL HISTORY  2019    Cyst excision    OTHER SURGICAL HISTORY  2019    Heart surgery       Patient  has no history on file for sexual activity.    Family History   Problem Relation Name Age of Onset    Coronary artery disease Mother      Hypertension Mother      Breast cancer Mother      Diabetes type II Mother         Allergies   Allergen Reactions    Cefaclor Anaphylaxis and Swelling    Cocoa Hives     Chocolate    Penicillins Swelling and Rash     FACIAL SWELLING       Prior to Admission medications    Medication Sig Start Date End Date Taking? Authorizing Provider   amLODIPine (Norvasc) 10 mg tablet Take 1 tablet (10 mg) by mouth once daily. 10/7/24 4/5/25  Raquel Galvez DO   aspirin 81 mg EC tablet Take 1 tablet (81 mg) by mouth once daily.    Historical Provider, MD   atorvastatin (Lipitor) 40 mg tablet Take 1 tablet (40 mg) by mouth once daily. 10/7/24 4/5/25  Raquel Galvez DO   cyanocobalamin, vitamin B-12, (Vitamin B-12) 1,000 mcg tablet extended release Take 1 tablet (1,000 mcg) by mouth once daily.    Historical Provider, MD   doxazosin (Cardura) 4  mg tablet Take 1 tablet (4 mg) by mouth once daily. 10/7/24   Raquel Galvez DO   folic acid (Folvite) 1 mg tablet Take 1 tablet (1 mg) by mouth once daily.    Historical Provider, MD   hydroCHLOROthiazide (HYDRODiuril) 25 mg tablet Take 1 tablet (25 mg) by mouth once daily. 10/7/24 4/5/25  Raquel Galvez DO   loratadine (Claritin) 10 mg tablet Take 1 tablet (10 mg) by mouth once daily.    Historical Provider, MD   losartan (Cozaar) 100 mg tablet Take 1 tablet (100 mg) by mouth once daily. 10/7/24 4/5/25  Raquel Galvez DO   metFORMIN (Glucophage) 1,000 mg tablet Take 1 tablet (1,000 mg) by mouth 2 times a day. 10/7/24 4/5/25  Raquel Galvez DO   multivitamin tablet Take by mouth.    Historical Provider, MD   pioglitazone (Actos) 15 mg tablet Take 1 tablet (15 mg) by mouth once daily. 10/7/24 4/5/25  Raquel Galvez DO   sildenafil (Viagra) 50 mg tablet Take 2 tablets (100 mg) by mouth if needed. Before intercourse 12/20/21   Historical Provider, MD   spironolactone (Aldactone) 25 mg tablet Take 1 tablet (25 mg) by mouth once daily. 10/7/24 4/5/25  Raquel Galvez DO   triamcinolone (Nasacort) 55 mcg nasal inhaler Administer 1-2 sprays into each nostril once daily.    Historical Provider, MD MORALES ROS:   Constitutional:   neg    Neuro/Psych:   neg    Eyes:   neg    Ears:   neg    Nose:   Mouth:   Throat:   neg    Neck:   neg    Cardio:    palpitations   peripheral edema   JJ  Respiratory:   Endocrine:   GI:   :   Musculoskeletal:    swelling  Hematologic:   Skin:  neg        Physical Exam  Vitals reviewed.   Constitutional:       Appearance: He is obese.   Eyes:      Extraocular Movements: Extraocular movements intact.      Pupils: Pupils are equal, round, and reactive to light.   Cardiovascular:      Pulses: Normal pulses.   Pulmonary:      Effort: Pulmonary effort is normal.      Breath sounds: Normal breath sounds.   Abdominal:      General: Abdomen is flat. Bowel sounds are normal. There is no  "distension.      Palpations: Abdomen is soft.      Tenderness: There is no abdominal tenderness.   Musculoskeletal:      Right lower leg: Edema present.      Left lower leg: Edema present.   Neurological:      Mental Status: He is alert and oriented to person, place, and time.   Psychiatric:         Thought Content: Thought content normal.          PAT AIRWAY:   Airway:     Mallampati::  I    Neck ROM::  Full  normal        Testing/Diagnostic:     Patient Specialist/PCP:     Visit Vitals  /74   Pulse (!) 116   Temp 36.7 °C (98.1 °F) (Oral)   Ht 1.676 m (5' 6\")   Wt 115 kg (253 lb 14.4 oz)   SpO2 95%   BMI 40.98 kg/m²   Smoking Status Never   BSA 2.31 m²       DASI Risk Score      Flowsheet Row Pre-Admission Testing from 2/14/2025 in [facility]   Can you take care of yourself (eat, dress, bathe, or use toilet)?  2.75 filed at 02/14/2025 1303   Can you walk indoors, such as around your house? 1.75 filed at 02/14/2025 1303   Can you walk a block or two on level ground?  2.75 filed at 02/14/2025 1303   Can you climb a flight of stairs or walk up a hill? 5.5 filed at 02/14/2025 1303   Can you run a short distance? 0 filed at 02/14/2025 1303   Can you do light work around the house like dusting or washing dishes? 2.7 filed at 02/14/2025 1303   Can you do moderate work around the house like vacuuming, sweeping floors or carrying groceries? 3.5 filed at 02/14/2025 1303   Can you do heavy work around the house like scrubbing floors or lifting and moving heavy furniture?  0 filed at 02/14/2025 1303   Can you do yard work like raking leaves, weeding or pushing a mower? 0 filed at 02/14/2025 1303   Can you have sexual relations? 0 filed at 02/14/2025 1303   Can you participate in moderate recreational activities like golf, bowling, dancing, doubles tennis or throwing a baseball or football? 0 filed at 02/14/2025 1303   Can you participate in strenous sports like swimming, singles tennis, football, " basketball, or skiing? 0 filed at 02/14/2025 1303   DASI SCORE 18.95 filed at 02/14/2025 1303   METS Score (Will be calculated only when all the questions are answered) 5.1 filed at 02/14/2025 1303          Caprini DVT Assessment      Flowsheet Row Pre-Admission Testing from 2/14/2025 in [facility]   DVT Score (IF A SCORE IS NOT CALCULATING, MUST SELECT A BMI TO COMPLETE) 7 filed at 02/14/2025 1324   Surgical Factors Minor surgery planned filed at 02/14/2025 1324   BMI (BMI MUST BE CHOSEN) 41-50 (Morbid obesity) filed at 02/14/2025 1324          Modified Frailty Index      Flowsheet Row Pre-Admission Testing from 2/14/2025 in [facility]   Non-independent functional status (problems with dressing, bathing, personal grooming, or cooking) 0 filed at 02/14/2025 1326   History of diabetes mellitus  0.0909 filed at 02/14/2025 1326   History of COPD 0 filed at 02/14/2025 1326   History of CHF No filed at 02/14/2025 1326   History of MI 0 filed at 02/14/2025 1326   History of Percutaneous Coronary Intervention, Cardiac Surgery, or Angina No filed at 02/14/2025 1326   Hypertension requiring the use of medication  0.0909 filed at 02/14/2025 1326   Peripheral vascular disease 0 filed at 02/14/2025 1326   Impaired sensorium (cognitive impairement or loss, clouding, or delirium) 0 filed at 02/14/2025 1326   TIA or CVA withouy residual deficit 0 filed at 02/14/2025 1326   Cerebrovascular accident with deficit 0 filed at 02/14/2025 1326   Modified Frailty Index Calculator .1818 filed at 02/14/2025 1326          CHADS2 Stroke Risk  Current as of about an hour ago        N/A 3 to 100%: High Risk   2 to < 3%: Medium Risk   0 to < 2%: Low Risk     Last Change: N/A          This score determines the patient's risk of having a stroke if the patient has atrial fibrillation.        This score is not applicable to this patient. Components are not calculated.          Revised Cardiac Risk Index       Flowsheet Row Pre-Admission Testing from 2/14/2025 in [facility]   High-Risk Surgery (Intraperitoneal, Intrathoracic,Suprainguinal vascular) 0 filed at 02/14/2025 1325   History of ischemic heart disease (History of MI, History of positive exercuse test, Current chest paint considered due to myocardial ischemia, Use of nitrate therapy, ECG with pathological Q Waves) 0 filed at 02/14/2025 1325   History of congestive heart failure (pulmonary edemia, bilateral rales or S3 gallop, Paroxysmal nocturnal dyspnea, CXR showing pulmonary vascular redistribution) 0 filed at 02/14/2025 1325   History of cerebrovascular disease (Prior TIA or stroke) 0 filed at 02/14/2025 1325   Pre-operative insulin treatment 0 filed at 02/14/2025 1325   Pre-operative creatinine>2 mg/dl 0 filed at 02/14/2025 1325   Revised Cardiac Risk Calculator 0 filed at 02/14/2025 1325          Apfel Simplified Score      Flowsheet Row Pre-Admission Testing from 2/14/2025 in [facility]   Smoking status 1 filed at 02/14/2025 1325   History of motion sickness or PONV  0 filed at 02/14/2025 1325   Use of postoperative opioids 0 filed at 02/14/2025 1325   Gender - Female 0=No filed at 02/14/2025 1325   Apfel Simplified Score Calculator 1 filed at 02/14/2025 1325          Risk Analysis Index Results This Encounter    No data found in the last 10 encounters.       Stop Bang Score      Flowsheet Row Pre-Admission Testing from 2/14/2025 in [facility]   Do you snore loudly? 1 filed at 02/14/2025 1302   Do you often feel tired or fatigued after your sleep? 1 filed at 02/14/2025 1302   Has anyone ever observed you stop breathing in your sleep? 1 filed at 02/14/2025 1302   Do you have or are you being treated for high blood pressure? 1 filed at 02/14/2025 1302   Recent BMI (Calculated) 42 filed at 02/14/2025 1302   Is BMI greater than 35 kg/m2? 1=Yes filed at 02/14/2025 1302   Age older than 50 years old? 1=Yes  filed at 02/14/2025 1302   Is your neck circumference greater than 17 inches (Male) or 16 inches (Female)? 1 filed at 02/14/2025 1302   Gender - Male 1=Yes filed at 02/14/2025 1302   STOP-BANG Total Score 8 filed at 02/14/2025 1302          Prodigy: High Risk  Total Score: 20              Prodigy Age Score      Prodigy Gender Score          ARISCAT Score for Postoperative Pulmonary Complications      Flowsheet Row Pre-Admission Testing from 2/14/2025 in [facility]   Age Calculated Score 3 filed at 02/14/2025 1326   Preoperative SpO2 0 filed at 02/14/2025 1326   Respiratory infection in the last month Either upper or lower (i.e., URI, bronchitis, pneumonia), with fever and antibiotic treatment 0 filed at 02/14/2025 1326   Preoperative anemia (Hgb less than 10 g/dl) 0 filed at 02/14/2025 1326   Surgical incision  0 filed at 02/14/2025 1326   Duration of surgery  0 filed at 02/14/2025 1326   Emergency Procedure  0 filed at 02/14/2025 1326   ARISCAT Total Score  3 filed at 02/14/2025 1326          Calvo Perioperative Risk for Myocardial Infarction or Cardiac Arrest (MARITA)      Flowsheet Row Pre-Admission Testing from 2/14/2025 in [facility]   Calculated Age Score 1.54 filed at 02/14/2025 1326   Functional Status  0 filed at 02/14/2025 1326   ASA Class  -1.92 filed at 02/14/2025 1326   Creatinine 0 filed at 02/14/2025 1326   Type of Procedure  -0.26 filed at 02/14/2025 1326   MARITA Total Score  -5.89 filed at 02/14/2025 1326   MARITA % 0.28 filed at 02/14/2025 1326            Assessment and Plan:     No results found for this or any previous visit (from the past 24 hours).     Assessment and Plan:    A 78 y/o M scheduled for Lt Hydrocelectomy  on 2/24/2025 with Dr. Red.      PMHX includes:  - HTN  - HLD  - Elevate Ca Score, on ASA & Statin   - DM  - BPH   - Anemia     Presents to CPM today for perioperative risk stratification and optimization.    Neuro:  No neurologic diagnosis,  however, the patient is at increased risk for perioperative delirium secondary to  age, sensory impairment, polypharmacy  Patient is at increased risk for perioperative CVA secondary to  HTN, DM, increased age    HEENT:  No HEENT diagnosis or significant findings on chart review or clinical presentation and evaluation. No further preoperative testing/intervention indicated at this time.    Cardiovascular:    - HTN  - HLD  - Elevate Ca Score, on ASA & Statin, will get Stress Echo given that the pt reports exertional SOB and elevated Ca score   METS: 2.1  RCRI: 0 points, 3.9%  risk for postoperative MACE   MARITA: 0.28% risk for 30 day postoperative MACE    - CT Heart Ca 12/2022:   Coronary artery calcium score of 840.8*.     Echo 1/2022:   1. The left ventricular systolic function is normal.   2. Spectral Doppler shows an impaired relaxation pattern of left ventricular diastolic filling.   3. There is no evidence of cardiac tamponade.    Pulmonary:  No pulmonary diagnosis, however patient is at increased risk of perioperative complications secondary to  age > 60, obesity, types of anesthetic  Stop Bang score is 8 placing patient at high risk for ALBERTA  ARISCAT: <26 points, 1.6% risk of in-hospital postoperative pulmonary complication  PRODIGY: High risk for opioid induced respiratory depression  Pumonary toilet education discussed, patient also provided deep breathing exercises and incentive spirometry educational handout    Renal:   - BPH  Age equal to or greater than 56, BMI equal to or greater than 30, HTN    Endocrine:  - DM    Hematologic:  - Anemia, will get updated CBC   - Caprini Score 7, patient at High risk for perioperative DVT.  Patient provided with VTE education/handout.    Gastrointestinal:   No GI diagnosis or significant findings on chart review or clinical presentation and evaluation.   Apfel 1    Infectious disease:   No infectious diagnosis or significant findings on chart review or clinical  presentation and evaluation.   Prescription provided for CHG body wash and dental rinse. CHG use instructions reviewed and provided to patient.  Staph screen collected    Musculoskeletal:   No diagnosis or significant findings on chart review or clinical presentation and evaluation.     Anesthesia/Airway:  No anesthesia complications      Medication instructions and NPO guidelines reviewed with the patient.  All questions or concerns discussed and addressed.      Patient seen and staffed with Dr. Watson

## 2025-02-14 NOTE — PREPROCEDURE INSTRUCTIONS
Thank you for visiting [facility] ([facility]) today for your pre-procedure evaluation, you were seen by Jorge Luis Milton MD [phone].    This summary includes instructions and information to aid you during your perioperative period.  Please read carefully. If you have any questions about your visit today, please call the number listed above.  If you become ill or have any changes to your health before your surgery, please contact your primary care provider and alert your surgeon.    Preparing for your Surgery       Exercises  Preoperative Deep Breathing Exercises  Why it is important to do deep breathing exercises before my surgery?  Deep breathing exercises strengthen your breathing muscles.  This helps you to recover after your surgery and decreases the chance of breathing complications.  How are the deep breathing exercises done?  Sit straight with your back supported.  Breathe in deeply and slowly through your nose. Your lower rib cage should expand and your abdomen may move forward.  Hold that breath for 3 to 5 seconds.  Breathe out through pursed lips, slowly and completely.  Rest and repeat 10 times every hour while awake.  Rest longer if you become dizzy or lightheaded.       Incentive Spirometer   You were provided with an incentive spirometer in [facility], please follow the below instructions.   You were not provided an incentive spirometer in [facility], please disregard the incentive spirometer instructions  What is an incentive spirometer?  An incentive spirometer is a device used before and after surgery to “exercise” your lungs.  It helps you to take deeper breaths to expand your lungs.  Below is an example of a basic incentive spirometer.  The device you receive may differ slightly but they all function the same.    Why do I need to use an incentive spirometer?  Using your incentive spirometer prepares your lungs for surgery and helps prevent lung problems after surgery.  How do  I use my incentive spirometer?  When you're using your incentive spirometer, make sure to breathe through your mouth. If you breathe through your nose, the incentive spirometer won't work properly. You can hold your nose if you have trouble.  If you feel dizzy at any time, stop and rest. Try again at a later time.  Follow the steps below:  Set up your incentive spirometer, expand the flexible tubing and connect to the outlet.  Sit upright in a chair or bed. Hold the incentive spirometer at eye level.   Put the mouthpiece in your mouth and close your lips tightly around it. Slowly breathe out (exhale) completely.  Breathe in (inhale) slowly through your mouth as deeply as you can. As you take a breath, you will see the piston rise inside the large column. While the piston rises, the indicator should move upwards. It should stay in between the 2 arrows (see Figure).  Try to get the piston as high as you can, while keeping the indicator between the arrows.   If the indicator doesn't stay between the arrows, you're breathing either too fast or too slow.  When you get it as high as you can, hold your breath for 10 seconds, or as long as possible. While you're holding your breath, the piston will slowly fall to the base of the spirometer.  Once the piston reaches the bottom of the spirometer, breathe out slowly through your mouth. Rest for a few seconds.  Repeat 10 times. Try to get the piston to the same level with each breath.  Repeat every hour while awake  You can carefully clean the outside of the mouthpiece with an alcohol wipe or soap and water.      Preoperative Brain Exercises    What are brain exercises?  A brain exercise is any activity that engages your thinking (cognitive) skills.    What types of activities are considered brain exercises?  Jigsaw puzzles, crossword puzzles, word jumble, memory games, word search, and many more.  Many can be found free online or on your phone via a mobile eriberto.    Why should I  do brain exercises before my surgery?  More recent research has shown brain exercise before surgery can lower the risk of postoperative delirium (confusion) which can be especially important for older adults.  Patients who did brain exercises for 5 to 10 hours the days before surgery, cut their risk of postoperative delirium in half up to 1 week after surgery.    Sit-to-Stand Exercise    What is the sit-to-stand exercise?  The sit-to-stand exercise strengthens the muscles of your lower body and muscles in the center of your body (core muscles for stability) helping to maintain and improve your strength and mobility.  How do I do the sit-to-stand exercise?  The goal is to do this exercise without using your arms or hands.  If this is too difficult, use your arms and hands or a chair with armrests to help slowly push yourself to the standing position and lower yourself back to the sitting position. As the movement becomes easier use your arms and hands less.    Steps to the sit-to-stand exercise  Sit up tall in a sturdy chair, knees bent, feet flat on the floor shoulder-width apart.  Shift your hips/pelvis forward in the chair to correctly position yourself for the next movement.  Lean forward at your hips.  Stand up straight putting equal weight on both feet.  Check to be sure you are properly aligned with the chair, in a slow controlled movement sit back down.  Repeat this exercise 10-15 times.  If needed you can do it fewer times until your strength improves.  Rest for 1 minute.  Do another 10-15 sit-to-stand exercises.  Try to do this in the morning and evening.        Instructions    Preoperative Fasting Guidelines    Why must I stop eating and drinking near surgery time?  With sedation, food or liquid in your stomach can enter your lungs causing serious complications  Food can increase nausea and vomiting  When do I need to stop eating and drinking before my surgery?      Do not eat any food after midnight the  night before your surgery/procedure. You may have up to 13.5 ounces of clear liquid until TWO hours before your instructed arrival time to the hospital.  This includes water, black tea/coffee, (no milk or cream) apple juice, and electrolyte drinks (Gatorade). You may chew gum until TWO hours before your surgery/procedure            Simple things you can do to help prevent blood clots     Blood clots are blockages that can form in the body's veins. When a blood clot forms in your deep veins, it may be called a deep vein thrombosis, or DVT for short. Blood clots can happen in any part of the body where blood flows, but they are most common in the arms and legs. If a piece of a blood clot breaks free and travels to the lungs, it is called a pulmonary embolus (PE). A PE can be a very serious problem.         Being in the hospital or having surgery can raise your chances of getting a blood clot because you may not be well enough to move around as much as you normally do.         Ways you can help prevent blood clots in the hospital       Wearing SCDs  SCDs stands for Sequential Compression Devices.   SCDs are special sleeves that wrap around your legs. They attach to a pump that fills them with air to gently squeeze your legs every few minutes.  This helps return the blood in your legs to your heart.   SCDs should only be taken off when walking or bathing. SCDs may not be comfortable, but they can help save your life.              Pump SCD leg sleeves  Wearing compression stockings - if your doctor orders them. These special snug-fitting stockings gently squeeze your legs to help blood flow.       Walking. Walking helps move the blood in your legs.   If your doctor says it is ok, try walking the halls at least   5 times a day. Ask us to help you get up, so you don't fall.      Taking any blood-thinning medicines your doctor orders.              Ways you can help prevent blood clots at home         Wearing compression  "stockings - if your doctor orders them.   Walking - to help move the blood in your legs.    Taking any blood-thinning medicines your doctor orders.      Signs of a blood clot or PE    Tell your doctor or nurse right away if you have any of the problems listed below.         If you are at home, seek medical care right away. Call 911 for chest pain or problems breathing.            Signs of a blood clot (DVT) - such as pain, swelling, redness, or warmth in your arm or legs.  Signs of a pulmonary embolism (PE) - such as chest pain or feeling short of breath      Tobacco and Alcohol;  Do not drink alcohol or smoke within 24 hours of surgery.  It is best to quit smoking for as long as possible before any surgery or procedure.       Other Instructions  Why did I have my nose, under my arms, and groin swabbed? The purpose of the swab is to identify Staphylococcus aureus inside your nose or on your skin.  The swab was sent to the laboratory for culture.  A positive swab/culture for Staphylococcus aureus is called colonization or carriage.   What is Staphylococcus aureus? Staphylococcus aureus, also known as \"staph\", is a germ found on the skin or in the nose of healthy people.  Sometimes Staphylococcus aureus can get into the body and cause an infection.  This can be minor (such as pimples, boils, or other skin problems).  It might also be serious (such as a blood infection, pneumonia, or a surgical site infection). What is Staphylococcus aureus colonization or carriage? Colonization or carriage means that a person has the germ but is not sick from it.  These bacteria can be spread on the hands or when breathing or sneezing. How is Staphylococcus aureus spread? It is most often spread by close contact with a person or item that carries it. What happens if my culture is positive for Staphylococcus aureus? Your doctor/medical team will use this information to guide any antibiotic treatment which may be necessary.  Regardless " of the culture results, we will clean the inside of your nose with a betadine swab just before you have your surgery. Will I get an infection if I have Staphylococcus aureus in my nose or on my skin? Anyone can get an infection with Staphylococcus aureus.  However, the best way to reduce your risk of infection is to follow the instructions provided to you for the use of your CHG soap and dental rinse.  , Body Wash; What is a home preoperative antibacterial shower? This shower is a way of cleaning the skin with a germ-killing solution before surgery.  The solution contains chlorhexidine, commonly known as CHG.  CHG is a skin cleanser with germ-killing ability.  Let your doctor know if you are allergic to chlorhexidine. Why do I need to take a preoperative antibacterial shower? Skin is not sterile.  It is best to try to make your skin as free of germs as possible before surgery.  Proper cleansing with a germ-killing soap before surgery can lower the number of germs on your skin.  This helps to reduce the risk of infection at the surgical site.  Following the instructions listed below will help you prepare your skin for surgery.   How do I use the solution? Steps:  Begin using your CHG soap 5 days before your scheduled surgery on ___________.   First, wash and rinse your hair using the CHG soap. Keep CHG soap away from ear canals and eyes.  Rinse completely, do not condition.  Hair extensions should be removed. , Oral/Dental Rinse: What is oral/dental rinse?  It is a mouthwash. It is a way of cleaning the mouth with a germ-killing solution before your surgery.  The solution contains chlorhexidine, commonly known as CHG. It is used inside the mouth to kill a bacteria known as Staphylococcus aureus.  Let your doctor know if you are allergic to Chlorhexidine. Why do I need to use CHG oral/dental rinse? The CHG oral/dental rinse helps to kill a bacteria in your mouth known as Staphylococcus aureus.  This reduces the risk  of infection at the surgical site.  Using your CHG oral/dental rinse STEPS: Use your CHG oral/dental rinse after you brush your teeth the night before (at bedtime) and the morning of your surgery.  Follow all directions on your prescription label.  Use the cap on the container to measure 15 ml.  Swish (gargle if you can) the mouthwash in your mouth for at least 30 seconds, (do not swallow) and spit out.  After you use your CHG rinse, do not rinse your mouth with water, drink or eat.  Please refer to the prescription label for the appropriate time to resume oral intake What side effects might I have using the CHG oral/dental rinse? CHG rinse will stick to plaque on the teeth.  Brush and floss just before use.  Teeth brushing will help avoid staining of plaque during use.       The Week before Surgery        Seven days before Surgery  Check your [facility] medication instructions  Do the exercises provided to you by [facility]   Arrange for a responsible, adult licensed  to take you home after surgery and stay with you for 24 hours.  You will not be permitted to drive yourself home if you have received any anesthetic/sedation  Six days before surgery  Check your [facility] medication instructions  Do the exercises provided to you by [facility]   Start using Chlorhexidene (CHG) body wash if prescribed  Five days before surgery  Check your [facility] medication instructions  Do the exercises provided to you by [facility]   Continue to use CHG body wash if prescribed  Three days before surgery  Check your [facility] medication instructions  Do the exercises provided to you by [facility]   Continue to use CHG body wash if prescribed  Two days before surgery  Check your [facility] medication instructions  Do the exercises provided to you by [facility]   Continue to use CHG body wash if prescribed      The Day before Surgery       Check your [facility] medication and all other [facility] instructions including when to stop eating and drinking  You will be called with your arrival time for surgery in  the late afternoon.  If you do not receive a call please reach out to your surgeon's office.  Do not smoke or drink 24 hours before surgery  Prepare items to bring with you to the hospital  Shower with your chlorhexidine wash if prescribed  Brush your teeth and use your chlorhexidine dental rinse if prescribed    The Day of Surgery       Check your [facility] medication instructions  Ensure you follow the instructions for when to stop eating and drinking  Shower, if prescribed use CHG.  Do not apply any lotions, creams, moisturizers, perfume or deodorant  Brush your teeth and use your CHG dental rinse if prescribed  Wear loose comfortable clothing  Avoid make-up  Remove  jewelry and piercings, consider professional piercing removal with a plastic spacer if needed  Bring photo ID and Insurance card  Bring an accurate medication list that includes medication dose, frequency and allergies  Bring a copy of your advanced directives (will, health care power of )  Bring any devices and controllers as well as medical devices you have been provided with for surgery (CPAP, slings, braces, etc.)  Dentures, eyeglasses, and contacts will be removed before surgery, please bring cases for contacts or glasses

## 2025-02-16 LAB — STAPHYLOCOCCUS SPEC CULT: ABNORMAL

## 2025-02-17 ENCOUNTER — HOSPITAL ENCOUNTER (OUTPATIENT)
Dept: CARDIOLOGY | Facility: HOSPITAL | Age: 80
Discharge: HOME | End: 2025-02-17
Payer: COMMERCIAL

## 2025-02-17 ENCOUNTER — APPOINTMENT (OUTPATIENT)
Dept: CARDIOLOGY | Facility: CLINIC | Age: 80
End: 2025-02-17
Payer: COMMERCIAL

## 2025-02-17 ENCOUNTER — TELEMEDICINE (OUTPATIENT)
Dept: PRIMARY CARE | Facility: CLINIC | Age: 80
End: 2025-02-17
Payer: COMMERCIAL

## 2025-02-17 ENCOUNTER — APPOINTMENT (OUTPATIENT)
Dept: CARDIOLOGY | Facility: HOSPITAL | Age: 80
End: 2025-02-17
Payer: COMMERCIAL

## 2025-02-17 DIAGNOSIS — Z01.811 PREOPERATIVE RESPIRATORY EXAMINATION: ICD-10-CM

## 2025-02-17 DIAGNOSIS — Z01.818 ENCOUNTER FOR OTHER PREPROCEDURAL EXAMINATION: ICD-10-CM

## 2025-02-17 DIAGNOSIS — Z22.322 COLONIZATION WITH MRSA (METHICILLIN RESISTANT STAPHYLOCOCCUS AUREUS): Primary | ICD-10-CM

## 2025-02-17 DIAGNOSIS — E11.69 TYPE 2 DIABETES MELLITUS WITH OTHER SPECIFIED COMPLICATION, WITHOUT LONG-TERM CURRENT USE OF INSULIN: ICD-10-CM

## 2025-02-17 DIAGNOSIS — Z41.9 SURGERY, ELECTIVE: ICD-10-CM

## 2025-02-17 DIAGNOSIS — Z01.810 PREOPERATIVE CARDIOVASCULAR EXAMINATION: ICD-10-CM

## 2025-02-17 PROCEDURE — 93356 MYOCRD STRAIN IMG SPCKL TRCK: CPT

## 2025-02-17 PROCEDURE — 93350 STRESS TTE ONLY: CPT | Performed by: INTERNAL MEDICINE

## 2025-02-17 PROCEDURE — 93356 MYOCRD STRAIN IMG SPCKL TRCK: CPT | Performed by: INTERNAL MEDICINE

## 2025-02-17 PROCEDURE — 1159F MED LIST DOCD IN RCRD: CPT | Performed by: FAMILY MEDICINE

## 2025-02-17 PROCEDURE — 2500000004 HC RX 250 GENERAL PHARMACY W/ HCPCS (ALT 636 FOR OP/ED)

## 2025-02-17 PROCEDURE — 1160F RVW MEDS BY RX/DR IN RCRD: CPT | Performed by: FAMILY MEDICINE

## 2025-02-17 PROCEDURE — 1036F TOBACCO NON-USER: CPT | Performed by: FAMILY MEDICINE

## 2025-02-17 PROCEDURE — 93325 DOPPLER ECHO COLOR FLOW MAPG: CPT | Performed by: INTERNAL MEDICINE

## 2025-02-17 PROCEDURE — 99213 OFFICE O/P EST LOW 20 MIN: CPT | Performed by: FAMILY MEDICINE

## 2025-02-17 PROCEDURE — 93016 CV STRESS TEST SUPVJ ONLY: CPT | Performed by: INTERNAL MEDICINE

## 2025-02-17 PROCEDURE — 93018 CV STRESS TEST I&R ONLY: CPT | Performed by: INTERNAL MEDICINE

## 2025-02-17 RX ORDER — ATROPINE SULFATE 0.1 MG/ML
.25-5 INJECTION INTRAVENOUS ONCE AS NEEDED
Status: DISCONTINUED | OUTPATIENT
Start: 2025-02-17 | End: 2025-02-18 | Stop reason: HOSPADM

## 2025-02-17 RX ORDER — DOBUTAMINE HYDROCHLORIDE 100 MG/100ML
5-40 INJECTION INTRAVENOUS CONTINUOUS
Status: DISCONTINUED | OUTPATIENT
Start: 2025-02-17 | End: 2025-02-18 | Stop reason: HOSPADM

## 2025-02-17 RX ADMIN — PERFLUTREN 4.5 ML OF DILUTION: 6.52 INJECTION, SUSPENSION INTRAVENOUS at 14:53

## 2025-02-17 RX ADMIN — DOBUTAMINE HYDROCHLORIDE 20 MCG/KG/MIN: 100 INJECTION INTRAVENOUS at 14:49

## 2025-02-17 ASSESSMENT — ENCOUNTER SYMPTOMS
NAUSEA: 0
DYSURIA: 0
DIARRHEA: 0
VOMITING: 0
SLEEP DISTURBANCE: 0
FATIGUE: 0
SHORTNESS OF BREATH: 0
DIFFICULTY URINATING: 0
PALPITATIONS: 0
ABDOMINAL PAIN: 0
MYALGIAS: 0
DIZZINESS: 0
HEADACHES: 0
FEVER: 0

## 2025-02-17 NOTE — PROGRESS NOTES
Subjective   Patient ID: Tomas Morataya is a 79 y.o. male who presents for medicine visit.  Patient at home, PCP in office    Virtual or Telephone Consent    An interactive audio and video telecommunication system which permits real time communications between the patient (at the originating site) and provider (at the distant site) was utilized to provide this telehealth service.   Verbal consent was requested and obtained from Tomas Morataya on this date, 02/17/25 for a telehealth visit.     HPI   Positive MRSA test: Apparently had a preoperative blood test for MRSA which was positive ordered by surgeon.  Currently patient has no active skin infections.  Does have a nonhealing area on his scalp from a Mohs procedure about 6 weeks ago.  No drainage.  No fevers.     Diabetes: Recent blood work showed controlled A1c of 6.9.    Review of Systems   Constitutional:  Negative for fatigue and fever.   Respiratory:  Negative for shortness of breath.    Cardiovascular:  Negative for chest pain and palpitations.   Gastrointestinal:  Negative for abdominal pain, diarrhea, nausea and vomiting.   Genitourinary:  Negative for difficulty urinating and dysuria.   Musculoskeletal:  Negative for myalgias.   Neurological:  Negative for dizziness and headaches.   Psychiatric/Behavioral:  Negative for sleep disturbance.        Objective   There were no vitals taken for this visit.    Physical Exam  HENT:      Head: Normocephalic.      Nose:      Comments: No conversational dyspnea  Pulmonary:      Effort: No respiratory distress.   Neurological:      General: No focal deficit present.      Mental Status: He is alert.   Psychiatric:         Mood and Affect: Mood normal.         Assessment/Plan   Problem List Items Addressed This Visit             ICD-10-CM    Type 2 diabetes mellitus E11.9     Other Visit Diagnoses         Codes    Colonization with MRSA (methicillin resistant Staphylococcus aureus)    -  Primary Z22.322        Reviewed  blood work.  Discussed that that he should contact the surgeon to determine the protocol for positive MRSA screen.  Patient does have chlorhexidine wash at home.  Recommend he monitor for any new rashes or skin changes.    Telemed 11 minutes, documentation 2 minutes.  Total 13 minutes

## 2025-02-21 NOTE — DISCHARGE INSTRUCTIONS
DEPARTMENT OF UROLOGY  DISCHARGE INSTRUCTIONS -- Hydrocelectomy  Outpatient Surgery    C O N F I D E N T I A L   I N F O R M A T I O N    Tomas Morataya      Call [phone] during regular daytime business hours (8:00 am - 5:00 pm) and after 5:00 pm and ask for the Urology Resident with any questions or concerns.      If it is a life-threatening situation, proceed to the nearest emergency department.        Follow-up appointment:  3/14/25     Thank you for the opportunity to care for you today.  Your health and healing are very important to us.  We hope we made you feel as comfortable as possible and are committed to your recovery and continued well-being.      The following is a brief overview of your hydrocelectomy surgery today. Some of the information contained on this summary may be confidential.  This information should be kept in your records and should be shared with your regular doctor.    Physicians:   Dr. Red      Procedure performed: removal of hydrocele    What to Expect During your Recovery and Home Care  Anesthesia Side Effects   You received general anesthesia today.  You may feel sleepy, tired, or have a sore throat.   You may also feel drowsiness, dizziness, or inability to think clearly.  For your safety, do not drive, drink alcoholic beverages, take any unprescribed medication or make any important decisions for 24 hours.  A responsible adult should be with you for 24 hours.        Activity and Recovery    No heavy lifting for 7-10 days. Wear supportive underwear (jock strap or snug briefs) to help support scrotum. Elevate your scrotum with towel when laying down to prevent swelling. The more you are up and moving around the more your scrotum will swell. Place an ice pack on top of underwear, never put ice pack directly on skin. Do not leave ice pack on longer then 10 to 15 minutes. Ice area of and on over the next few days.     Do not drive or operate heavy machinery while taking narcotic  pain medications as these medications can alter perception, impair judgement, and slow reaction times.    Pain Control  Unfortunately, you may experience pain after your procedure.  Adequate management can include alternative measures to help ease your pain and can include ice packs, over the counter Tylenol or Ibuprofen. Do not take more then 4,000mg of Tylenol in a 24-hour period.     Nausea/Vomiting   Clear liquids are best tolerated at first. Start slow, advance your diet as tolerated to normal foods. Avoid spicy, greasy, heavy foods at first. Also, you may feel nauseous or like you need to vomit if you take any type of medication on an empty stomach.  Call your physician if you are unable to eat or drink and have persistent vomiting.    Signs of Bleeding   Minor bleeding or drainage may occur from the surgical incision; however, excessive or consistent bleeding should be reported to your surgeon. Excessive bleeding is defined as blood that is dripping from wound, soaking you bandages, and is ketchup colored, thick with possible blood clots.  Consistent is defined as bleeding that does not stop.      Treatment/wound care:   Keep area(s) clean and dry.   It is okay to shower 24 hours after time of surgery.    Do not scrub wound(s), pat dry.    Do not submerge wound(s) in standing water until seen for follow up appointment (no tub bathing, swimming, or hot tubs).    Clean with mild soap, gentle washing, pat dry, cover with gauze as needed.    No oils or lotions on incisions, you can apply bacitracin ointment to your incisions.   Sutures will dissolve on their own.    Wear jock strap for 1 week.    Please visually inspect your wound(s) at least once daily.  If the wound(s) are in a difficult to see location, please use a mirror or have someone else assist with visual inspection.    Signs of Infection  Signs of infection can include fever, excessive swelling, heat, drainage, redness, or severe pain at incision site.  If you see any of these occur, please contact your doctor's office at [phone]. Any fever higher than 100.4, especially if associated with an ill feeling, abdominal pain, chills, or nausea should be reported to your surgeon.      Assist in bowel movements/urination  Increase fiber in diet  Urination should occur within 6 hours of anesthesia.   Increase water (6 to 8 glasses)  Increase walking   If you have tried these methods and your bladder still feels full and you cannot use the bathroom, please go to your nearest Emergency room.    Additional Instructions:   At your follow up visit we will check your incision for infection.

## 2025-02-24 ENCOUNTER — ANESTHESIA (OUTPATIENT)
Dept: OPERATING ROOM | Facility: HOSPITAL | Age: 80
End: 2025-02-24
Payer: COMMERCIAL

## 2025-02-24 ENCOUNTER — HOSPITAL ENCOUNTER (OUTPATIENT)
Facility: HOSPITAL | Age: 80
Setting detail: OUTPATIENT SURGERY
Discharge: HOME | End: 2025-02-24
Attending: UROLOGY | Admitting: UROLOGY
Payer: COMMERCIAL

## 2025-02-24 VITALS
RESPIRATION RATE: 12 BRPM | HEIGHT: 66 IN | SYSTOLIC BLOOD PRESSURE: 143 MMHG | OXYGEN SATURATION: 93 % | HEART RATE: 82 BPM | TEMPERATURE: 96.8 F | DIASTOLIC BLOOD PRESSURE: 72 MMHG | BODY MASS INDEX: 40.32 KG/M2 | WEIGHT: 250.88 LBS

## 2025-02-24 DIAGNOSIS — N43.3 HYDROCELE, UNSPECIFIED HYDROCELE TYPE: Primary | ICD-10-CM

## 2025-02-24 LAB
GLUCOSE BLD MANUAL STRIP-MCNC: 146 MG/DL (ref 74–99)
GLUCOSE BLD MANUAL STRIP-MCNC: 153 MG/DL (ref 74–99)
GLUCOSE BLD MANUAL STRIP-MCNC: 202 MG/DL (ref 74–99)

## 2025-02-24 PROCEDURE — 3600000007 HC OR TIME - EACH INCREMENTAL 1 MINUTE - PROCEDURE LEVEL TWO: Performed by: UROLOGY

## 2025-02-24 PROCEDURE — 3700000001 HC GENERAL ANESTHESIA TIME - INITIAL BASE CHARGE: Performed by: UROLOGY

## 2025-02-24 PROCEDURE — A55040 PR REMOVAL OF HYDROCELE,TUNICA,UNILAT: Performed by: ANESTHESIOLOGY

## 2025-02-24 PROCEDURE — 88302 TISSUE EXAM BY PATHOLOGIST: CPT | Mod: TC,SUR | Performed by: UROLOGY

## 2025-02-24 PROCEDURE — 2500000004 HC RX 250 GENERAL PHARMACY W/ HCPCS (ALT 636 FOR OP/ED): Performed by: UROLOGY

## 2025-02-24 PROCEDURE — 82947 ASSAY GLUCOSE BLOOD QUANT: CPT

## 2025-02-24 PROCEDURE — 2500000004 HC RX 250 GENERAL PHARMACY W/ HCPCS (ALT 636 FOR OP/ED)

## 2025-02-24 PROCEDURE — 2720000007 HC OR 272 NO HCPCS: Performed by: UROLOGY

## 2025-02-24 PROCEDURE — 7100000002 HC RECOVERY ROOM TIME - EACH INCREMENTAL 1 MINUTE: Performed by: UROLOGY

## 2025-02-24 PROCEDURE — 7100000001 HC RECOVERY ROOM TIME - INITIAL BASE CHARGE: Performed by: UROLOGY

## 2025-02-24 PROCEDURE — 7100000010 HC PHASE TWO TIME - EACH INCREMENTAL 1 MINUTE: Performed by: UROLOGY

## 2025-02-24 PROCEDURE — 2500000001 HC RX 250 WO HCPCS SELF ADMINISTERED DRUGS (ALT 637 FOR MEDICARE OP): Performed by: PHYSICIAN ASSISTANT

## 2025-02-24 PROCEDURE — 2500000005 HC RX 250 GENERAL PHARMACY W/O HCPCS: Performed by: UROLOGY

## 2025-02-24 PROCEDURE — 3600000002 HC OR TIME - INITIAL BASE CHARGE - PROCEDURE LEVEL TWO: Performed by: UROLOGY

## 2025-02-24 PROCEDURE — 96372 THER/PROPH/DIAG INJ SC/IM: CPT

## 2025-02-24 PROCEDURE — 2500000002 HC RX 250 W HCPCS SELF ADMINISTERED DRUGS (ALT 637 FOR MEDICARE OP, ALT 636 FOR OP/ED)

## 2025-02-24 PROCEDURE — 55040 REMOVAL OF HYDROCELE: CPT | Performed by: UROLOGY

## 2025-02-24 PROCEDURE — 2500000005 HC RX 250 GENERAL PHARMACY W/O HCPCS

## 2025-02-24 PROCEDURE — A55040 PR REMOVAL OF HYDROCELE,TUNICA,UNILAT

## 2025-02-24 PROCEDURE — 99100 ANES PT EXTEME AGE<1 YR&>70: CPT | Performed by: ANESTHESIOLOGY

## 2025-02-24 PROCEDURE — 3700000002 HC GENERAL ANESTHESIA TIME - EACH INCREMENTAL 1 MINUTE: Performed by: UROLOGY

## 2025-02-24 PROCEDURE — 7100000009 HC PHASE TWO TIME - INITIAL BASE CHARGE: Performed by: UROLOGY

## 2025-02-24 RX ORDER — PROPOFOL 10 MG/ML
INJECTION, EMULSION INTRAVENOUS AS NEEDED
Status: DISCONTINUED | OUTPATIENT
Start: 2025-02-24 | End: 2025-02-24

## 2025-02-24 RX ORDER — HYDROMORPHONE HYDROCHLORIDE 1 MG/ML
INJECTION, SOLUTION INTRAMUSCULAR; INTRAVENOUS; SUBCUTANEOUS AS NEEDED
Status: DISCONTINUED | OUTPATIENT
Start: 2025-02-24 | End: 2025-02-24

## 2025-02-24 RX ORDER — DROPERIDOL 2.5 MG/ML
INJECTION, SOLUTION INTRAMUSCULAR; INTRAVENOUS AS NEEDED
Status: DISCONTINUED | OUTPATIENT
Start: 2025-02-24 | End: 2025-02-24

## 2025-02-24 RX ORDER — ONDANSETRON 4 MG/1
4 TABLET, ORALLY DISINTEGRATING ORAL ONCE
Status: COMPLETED | OUTPATIENT
Start: 2025-02-24 | End: 2025-02-24

## 2025-02-24 RX ORDER — ROCURONIUM BROMIDE 10 MG/ML
INJECTION, SOLUTION INTRAVENOUS AS NEEDED
Status: DISCONTINUED | OUTPATIENT
Start: 2025-02-24 | End: 2025-02-24

## 2025-02-24 RX ORDER — CELECOXIB 200 MG/1
400 CAPSULE ORAL ONCE
Status: COMPLETED | OUTPATIENT
Start: 2025-02-24 | End: 2025-02-24

## 2025-02-24 RX ORDER — SODIUM CHLORIDE, SODIUM LACTATE, POTASSIUM CHLORIDE, CALCIUM CHLORIDE 600; 310; 30; 20 MG/100ML; MG/100ML; MG/100ML; MG/100ML
100 INJECTION, SOLUTION INTRAVENOUS CONTINUOUS
Status: DISCONTINUED | OUTPATIENT
Start: 2025-02-24 | End: 2025-02-24 | Stop reason: HOSPADM

## 2025-02-24 RX ORDER — DROPERIDOL 2.5 MG/ML
0.62 INJECTION, SOLUTION INTRAMUSCULAR; INTRAVENOUS ONCE AS NEEDED
Status: DISCONTINUED | OUTPATIENT
Start: 2025-02-24 | End: 2025-02-24 | Stop reason: HOSPADM

## 2025-02-24 RX ORDER — FENTANYL CITRATE 50 UG/ML
INJECTION, SOLUTION INTRAMUSCULAR; INTRAVENOUS AS NEEDED
Status: DISCONTINUED | OUTPATIENT
Start: 2025-02-24 | End: 2025-02-24

## 2025-02-24 RX ORDER — ESOMEPRAZOLE MAGNESIUM 40 MG/1
40 CAPSULE, DELAYED RELEASE ORAL
Qty: 5 CAPSULE | Refills: 0 | Status: SHIPPED | OUTPATIENT
Start: 2025-02-24 | End: 2025-03-01

## 2025-02-24 RX ORDER — CIPROFLOXACIN 2 MG/ML
400 INJECTION, SOLUTION INTRAVENOUS ONCE
Status: DISCONTINUED | OUTPATIENT
Start: 2025-02-24 | End: 2025-02-24 | Stop reason: HOSPADM

## 2025-02-24 RX ORDER — OMEPRAZOLE 10 MG/1
10 CAPSULE, DELAYED RELEASE ORAL
Qty: 5 CAPSULE | Refills: 0 | Status: SHIPPED | OUTPATIENT
Start: 2025-02-24 | End: 2025-03-01

## 2025-02-24 RX ORDER — SODIUM CHLORIDE 0.9 G/100ML
INJECTION, SOLUTION IRRIGATION AS NEEDED
Status: DISCONTINUED | OUTPATIENT
Start: 2025-02-24 | End: 2025-02-24 | Stop reason: HOSPADM

## 2025-02-24 RX ORDER — LIDOCAINE HYDROCHLORIDE 40 MG/ML
SOLUTION TOPICAL AS NEEDED
Status: DISCONTINUED | OUTPATIENT
Start: 2025-02-24 | End: 2025-02-24

## 2025-02-24 RX ORDER — GABAPENTIN 300 MG/1
300 CAPSULE ORAL ONCE
Status: COMPLETED | OUTPATIENT
Start: 2025-02-24 | End: 2025-02-24

## 2025-02-24 RX ORDER — MEPERIDINE HYDROCHLORIDE 25 MG/ML
12.5 INJECTION INTRAMUSCULAR; INTRAVENOUS; SUBCUTANEOUS EVERY 10 MIN PRN
Status: DISCONTINUED | OUTPATIENT
Start: 2025-02-24 | End: 2025-02-24 | Stop reason: HOSPADM

## 2025-02-24 RX ORDER — KETAMINE HYDROCHLORIDE 10 MG/ML
INJECTION, SOLUTION INTRAMUSCULAR; INTRAVENOUS AS NEEDED
Status: DISCONTINUED | OUTPATIENT
Start: 2025-02-24 | End: 2025-02-24

## 2025-02-24 RX ORDER — ALBUTEROL SULFATE 0.83 MG/ML
2.5 SOLUTION RESPIRATORY (INHALATION) ONCE AS NEEDED
Status: DISCONTINUED | OUTPATIENT
Start: 2025-02-24 | End: 2025-02-24 | Stop reason: HOSPADM

## 2025-02-24 RX ORDER — LIDOCAINE HYDROCHLORIDE 10 MG/ML
0.1 INJECTION, SOLUTION EPIDURAL; INFILTRATION; INTRACAUDAL; PERINEURAL ONCE
Status: DISCONTINUED | OUTPATIENT
Start: 2025-02-24 | End: 2025-02-24 | Stop reason: HOSPADM

## 2025-02-24 RX ORDER — LABETALOL HYDROCHLORIDE 5 MG/ML
5 INJECTION, SOLUTION INTRAVENOUS ONCE AS NEEDED
Status: DISCONTINUED | OUTPATIENT
Start: 2025-02-24 | End: 2025-02-24 | Stop reason: HOSPADM

## 2025-02-24 RX ORDER — ACETAMINOPHEN 325 MG/1
975 TABLET ORAL ONCE
Status: COMPLETED | OUTPATIENT
Start: 2025-02-24 | End: 2025-02-24

## 2025-02-24 RX ORDER — SODIUM CHLORIDE, SODIUM LACTATE, POTASSIUM CHLORIDE, CALCIUM CHLORIDE 600; 310; 30; 20 MG/100ML; MG/100ML; MG/100ML; MG/100ML
INJECTION, SOLUTION INTRAVENOUS CONTINUOUS PRN
Status: DISCONTINUED | OUTPATIENT
Start: 2025-02-24 | End: 2025-02-24

## 2025-02-24 RX ORDER — VANCOMYCIN HYDROCHLORIDE 1 G/20ML
INJECTION, POWDER, LYOPHILIZED, FOR SOLUTION INTRAVENOUS AS NEEDED
Status: DISCONTINUED | OUTPATIENT
Start: 2025-02-24 | End: 2025-02-24

## 2025-02-24 RX ORDER — KETOROLAC TROMETHAMINE 10 MG/1
10 TABLET, FILM COATED ORAL EVERY 6 HOURS PRN
Qty: 16 TABLET | Refills: 0 | Status: SHIPPED | OUTPATIENT
Start: 2025-02-24 | End: 2025-02-28

## 2025-02-24 RX ORDER — HYDROMORPHONE HYDROCHLORIDE 0.2 MG/ML
0.2 INJECTION INTRAMUSCULAR; INTRAVENOUS; SUBCUTANEOUS EVERY 5 MIN PRN
Status: DISCONTINUED | OUTPATIENT
Start: 2025-02-24 | End: 2025-02-24 | Stop reason: HOSPADM

## 2025-02-24 RX ORDER — OXYCODONE HYDROCHLORIDE 5 MG/1
5 TABLET ORAL EVERY 4 HOURS PRN
Status: DISCONTINUED | OUTPATIENT
Start: 2025-02-24 | End: 2025-02-24 | Stop reason: HOSPADM

## 2025-02-24 RX ORDER — GLYCOPYRROLATE 0.2 MG/ML
INJECTION INTRAMUSCULAR; INTRAVENOUS AS NEEDED
Status: DISCONTINUED | OUTPATIENT
Start: 2025-02-24 | End: 2025-02-24

## 2025-02-24 RX ORDER — METHOCARBAMOL 100 MG/ML
1000 INJECTION, SOLUTION INTRAMUSCULAR; INTRAVENOUS ONCE
Status: DISCONTINUED | OUTPATIENT
Start: 2025-02-24 | End: 2025-02-24 | Stop reason: HOSPADM

## 2025-02-24 RX ORDER — MIDAZOLAM HYDROCHLORIDE 1 MG/ML
1 INJECTION INTRAMUSCULAR; INTRAVENOUS ONCE AS NEEDED
Status: DISCONTINUED | OUTPATIENT
Start: 2025-02-24 | End: 2025-02-24 | Stop reason: HOSPADM

## 2025-02-24 RX ORDER — LIDOCAINE HCL/PF 100 MG/5ML
SYRINGE (ML) INTRAVENOUS AS NEEDED
Status: DISCONTINUED | OUTPATIENT
Start: 2025-02-24 | End: 2025-02-24

## 2025-02-24 RX ORDER — BUPIVACAINE HYDROCHLORIDE 5 MG/ML
INJECTION, SOLUTION EPIDURAL; INTRACAUDAL AS NEEDED
Status: DISCONTINUED | OUTPATIENT
Start: 2025-02-24 | End: 2025-02-24 | Stop reason: HOSPADM

## 2025-02-24 RX ORDER — APREPITANT 40 MG/1
CAPSULE ORAL AS NEEDED
Status: DISCONTINUED | OUTPATIENT
Start: 2025-02-24 | End: 2025-02-24

## 2025-02-24 RX ADMIN — GABAPENTIN 300 MG: 300 CAPSULE ORAL at 07:02

## 2025-02-24 RX ADMIN — APREPITANT 40 MG: 40 CAPSULE ORAL at 07:11

## 2025-02-24 RX ADMIN — LIDOCAINE HYDROCHLORIDE 4 ML: 40 SOLUTION TOPICAL at 07:31

## 2025-02-24 RX ADMIN — ONDANSETRON 4 MG: 4 TABLET, ORALLY DISINTEGRATING ORAL at 07:02

## 2025-02-24 RX ADMIN — ACETAMINOPHEN 975 MG: 325 TABLET ORAL at 07:02

## 2025-02-24 RX ADMIN — CELECOXIB 400 MG: 200 CAPSULE ORAL at 07:02

## 2025-02-24 RX ADMIN — ROCURONIUM 50 MG: 50 INJECTION, SOLUTION INTRAVENOUS at 07:28

## 2025-02-24 RX ADMIN — FENTANYL CITRATE 25 MCG: 50 INJECTION, SOLUTION INTRAMUSCULAR; INTRAVENOUS at 07:59

## 2025-02-24 RX ADMIN — GLYCOPYRROLATE 0.1 MG: 0.2 INJECTION, SOLUTION INTRAMUSCULAR; INTRAVENOUS at 07:25

## 2025-02-24 RX ADMIN — PROPOFOL 200 MG: 10 INJECTION, EMULSION INTRAVENOUS at 07:28

## 2025-02-24 RX ADMIN — VANCOMYCIN HYDROCHLORIDE 1 G: 1025 INJECTION, POWDER, FOR SOLUTION INTRAVENOUS; ORAL at 07:34

## 2025-02-24 RX ADMIN — DROPERIDOL 0.62 MG: 2.5 INJECTION, SOLUTION INTRAMUSCULAR; INTRAVENOUS at 07:38

## 2025-02-24 RX ADMIN — DEXAMETHASONE SODIUM PHOSPHATE 4 MG: 4 INJECTION INTRA-ARTICULAR; INTRALESIONAL; INTRAMUSCULAR; INTRAVENOUS; SOFT TISSUE at 07:38

## 2025-02-24 RX ADMIN — GLYCOPYRROLATE 0.1 MG: 0.2 INJECTION, SOLUTION INTRAMUSCULAR; INTRAVENOUS at 07:47

## 2025-02-24 RX ADMIN — LIDOCAINE HYDROCHLORIDE 4 MG: 20 INJECTION INTRAVENOUS at 07:28

## 2025-02-24 RX ADMIN — HYDROMORPHONE HYDROCHLORIDE 0.4 MG: 1 INJECTION, SOLUTION INTRAMUSCULAR; INTRAVENOUS; SUBCUTANEOUS at 08:15

## 2025-02-24 RX ADMIN — SUGAMMADEX 200 MG: 100 INJECTION, SOLUTION INTRAVENOUS at 08:44

## 2025-02-24 RX ADMIN — Medication 20 MG: at 08:03

## 2025-02-24 RX ADMIN — FENTANYL CITRATE 75 MCG: 50 INJECTION, SOLUTION INTRAMUSCULAR; INTRAVENOUS at 07:28

## 2025-02-24 RX ADMIN — Medication 20 MG: at 07:28

## 2025-02-24 RX ADMIN — SODIUM CHLORIDE, POTASSIUM CHLORIDE, SODIUM LACTATE AND CALCIUM CHLORIDE: 600; 310; 30; 20 INJECTION, SOLUTION INTRAVENOUS at 07:19

## 2025-02-24 ASSESSMENT — PAIN SCALES - GENERAL
PAINLEVEL_OUTOF10: 0 - NO PAIN
PAINLEVEL_OUTOF10: 0 - NO PAIN
PAINLEVEL_OUTOF10: 3
PAINLEVEL_OUTOF10: 0 - NO PAIN
PAIN_LEVEL: 2
PAINLEVEL_OUTOF10: 0 - NO PAIN
PAINLEVEL_OUTOF10: 0 - NO PAIN

## 2025-02-24 ASSESSMENT — PAIN - FUNCTIONAL ASSESSMENT
PAIN_FUNCTIONAL_ASSESSMENT: 0-10

## 2025-02-24 ASSESSMENT — COLUMBIA-SUICIDE SEVERITY RATING SCALE - C-SSRS
6. HAVE YOU EVER DONE ANYTHING, STARTED TO DO ANYTHING, OR PREPARED TO DO ANYTHING TO END YOUR LIFE?: NO
2. HAVE YOU ACTUALLY HAD ANY THOUGHTS OF KILLING YOURSELF?: NO
1. IN THE PAST MONTH, HAVE YOU WISHED YOU WERE DEAD OR WISHED YOU COULD GO TO SLEEP AND NOT WAKE UP?: NO

## 2025-02-24 NOTE — ANESTHESIA POSTPROCEDURE EVALUATION
Patient: Tomas Morataya    Procedure Summary       Date: 02/24/25 Room / Location: [facility] OR 05 / [facility] OR    Anesthesia Start: 0719 Anesthesia Stop:     Procedure: HYDROCELECTOMY (Left: Pelvis) Diagnosis:       Hydrocele, unspecified hydrocele type      (Hydrocele, unspecified hydrocele type [N43.3])    Surgeons: Marc Red MD Responsible Provider: Juan Davila MD    Anesthesia Type: general ASA Status: 3            Anesthesia Type: general    Vitals Value Taken Time   /65 02/24/25 0945   Temp 36.3 °C (97.3 °F) 02/24/25 0945   Pulse 78 02/24/25 0945   Resp 16 02/24/25 0945   SpO2 95 % 02/24/25 0945       Anesthesia Post Evaluation    Patient location during evaluation: PACU  Patient participation: complete - patient participated  Level of consciousness: awake and alert  Pain score: 2  Pain management: adequate  Airway patency: patent  Cardiovascular status: acceptable  Respiratory status: acceptable  Hydration status: acceptable  Postoperative Nausea and Vomiting: mild    No notable events documented.

## 2025-02-24 NOTE — OP NOTE
HYDROCELECTOMY (L) Operative Note     Date: 2025  OR Location: [facility] OR    Name: Tomas Morataya, : 1945, Age: 80 y.o., MRN: [id number], Sex: male    Diagnosis  Pre-op Diagnosis      * Hydrocele, unspecified hydrocele type [N43.3] Post-op Diagnosis     * Hydrocele, unspecified hydrocele type [N43.3]     Procedures  HYDROCELECTOMY  61147 - [place] EXCISION HYDROCELE UNILATERAL      Surgeons      * Marc Red - Primary    Resident/Fellow/Other Assistant:  Surgeons and Role:     * Ashleigh Lopez MD - Resident - Assisting     * Jackie Galdamez PA-C - [facility] First Assist    Staff:   Circulator: Toño  Circulator: Enrique Hancockub Person: Jaylen Hancockub Person: Alice    Anesthesia Staff: Anesthesiologist: Juan Davila MD  C-AA: MANNY Nails  KAPIL: Liliya Ashley    Procedure Summary  Anesthesia: General  ASA: III  Estimated Blood Loss: 5mL  Intra-op Medications:   Administrations occurring from 0700 to 0845 on 25:   Medication Name Total Dose   sodium chloride 0.9 % irrigation solution 1,000 mL   bupivacaine PF (Marcaine) 0.5 % (5 mg/mL) injection 30 mL   aprepitant (Emend) capsule 40 mg   dexAMETHasone (Decadron) injection 4 mg/mL 4 mg   droperidol (Inapsine) injection 0.625 mg   fentaNYL (Sublimaze) injection 50 mcg/mL 100 mcg   glycopyrrolate (Robinul) injection 0.2 mg   HYDROmorphone (Dilaudid) injection 1 mg/mL 0.4 mg   ketamine (Ketalar) 10 mg/mL injection 40 mg   LR infusion Cannot be calculated   lidocaine (cardiac) injection 2% prefilled syringe 4 mg   lidocaine (LTA Kit) for intubation 4 mL   propofol (Diprivan) injection 10 mg/mL 200 mg   rocuronium (ZeMuron) 50 mg/5 mL injection 50 mg   sugammadex (Bridion) 200 mg/2 mL injection 200 mg   vancomycin 1 g 1 g   acetaminophen (Tylenol) tablet 975 mg 975 mg   celecoxib (CeleBREX) capsule 400 mg 400 mg   gabapentin (Neurontin) capsule 300 mg 300 mg   ondansetron ODT (Zofran-ODT) disintegrating tablet 4 mg 4 mg              Anesthesia  Record               Intraprocedure I/O Totals          Intake    Ketamine 0.00 mL    The total shown is the total volume documented since Anesthesia Start was filed.    Total Intake 0 mL          Specimen:   ID Type Source Tests Collected by Time   1 : recurrent hydrocelic sac Tissue HYDROCELE SAC LEFT SURGICAL PATHOLOGY EXAM Marc Red MD 2/24/2025 0814                 Drains and/or Catheters: * None in log *    Tourniquet Times:         Implants:     Findings: scarred moderate sized hydrocele, testicle normal appearing     Indications: Tomas Morataya is an 80 y.o. male who is having surgery for Hydrocele, unspecified hydrocele type [N43.3].     The patient was seen in the preoperative area. The risks, benefits, complications, treatment options, non-operative alternatives, expected recovery and outcomes were discussed with the patient. The possibilities of reaction to medication, pulmonary aspiration, injury to surrounding structures, bleeding, recurrent infection, the need for additional procedures, failure to diagnose a condition, and creating a complication requiring transfusion or operation were discussed with the patient. The patient concurred with the proposed plan, giving informed consent.  The site of surgery was properly noted/marked if necessary per policy. The patient has been actively warmed in preoperative area. Preoperative antibiotics have been ordered and given within 1 hours of incision. Venous thrombosis prophylaxis have been ordered including bilateral sequential compression devices    Procedure Details:     The patient was seen in the preoperative area, where the risks, benefits, and indications were discussed with the patient.  The patient agreed to proceed with the procedure. Allergies and medications were reviewed. At this time, he was taken back to the operating room, where general anesthesia was induced.  He was placed in supine position.  He was prepped and draped in a standard sterile  fashion.      Subsequently, we made 3.5 cm transverse incision over the left hemiscrotum and dissected down to the hydrocele.  The hydrocele was subsequently delivered, and it was drained. The excess hydrocele sac was resected.  Using a 3-0 Vicryl, we imbricated the hydrocele sac.  We subsequently used a 2-0 Vicryl to close the dartos and a 3-0 Vicryl subcuticular stitch to close the skin. Of note, a small button hole was made during the incision at the beginning of the surgery. This was closed with 2-0 vicryl to close the dartos and 3-0 vicryl in an simple horizontal mattress fashion.  This concluded the end of the procedure.  Dermabond was applied to the incision.  Fluffs and jockstrap were provided.  The patient was awoken from anesthesia and taken to PACU in stable condition.        Complications:  None; patient tolerated the procedure well.    Disposition: PACU - hemodynamically stable.  Condition: stable             Task Performed by [facility] First Assist or Physician Assistant:   ROBERTO Boyce was necessary to assist on this case due to the nature of the case and difficulty. During the case, Jackie Galdamez served as my assist by assisting with incision, excision of hydrocele sac, imbrication, and closure of the incision.        Attending Attestation: I was present and scrubbed for the entire procedure.    Marc Red  Phone Number: [phone]

## 2025-02-24 NOTE — ANESTHESIA PREPROCEDURE EVALUATION
Patient: Tomas Morataya    Procedure Information       Date/Time: 02/24/25 0700    Procedure: HYDROCELECTOMY (Left: Pelvis)    Location: [facility] OR 05 / [facility] OR    Surgeons: Marc Red MD            Relevant Problems   Cardiac   (+) Benign essential hypertension   (+) Coronary arteriosclerosis   (+) Essential hypertension   (+) Hyperlipidemia   (+) PVD (peripheral vascular disease) (CMS-HCC)      /Renal   (+) Benign prostatic hyperplasia without urinary obstruction      Endocrine   (+) Morbid obesity (Multi)   (+) Type 2 diabetes mellitus      Hematology   (+) Anemia      HEENT   (+) Vision loss       Clinical information reviewed:   Tobacco  Allergies  Meds  Problems  Med Hx  Surg Hx   Fam Hx  Soc   Hx        NPO Detail:  NPO/Void Status  Carbohydrate Drink Given Prior to Surgery? : N  Date of Last Liquid: 02/24/25  Time of Last Liquid: 0702 (sips with preop meds)  Date of Last Solid: 02/23/25  Time of Last Solid: 1800  Last Intake Type: Clear fluids         Physical Exam    Airway  Mallampati: II     Cardiovascular - normal exam  Rhythm: regular  Rate: normal     Dental    Pulmonary - normal exam     Abdominal - normal exam         Anesthesia Plan    History of general anesthesia?: yes  History of complications of general anesthesia?: no    ASA 3     general     Anesthetic plan and risks discussed with patient.    Plan discussed with CRNA and CAA.

## 2025-02-24 NOTE — INTERVAL H&P NOTE
H&P reviewed. The patient was examined and noted 1+ bilateral lower extremity edema L>R. CPAP with patient. No other changes to H&P

## 2025-02-24 NOTE — PERIOPERATIVE NURSING NOTE
Dr. Castillo to see pt related to double vision pt has had.  Stated pt may go home and vision is Ok.

## 2025-02-24 NOTE — ANESTHESIA PROCEDURE NOTES
Airway  Date/Time: 2/24/2025 7:31 AM  Urgency: elective    Airway not difficult    Staffing  Performed: KAPIL   Authorized by: Juan Davila MD    Performed by: MANNY Nails  Patient location during procedure: OR    Indications and Patient Condition  Indications for airway management: anesthesia and airway protection  Spontaneous Ventilation: absent  Sedation level: deep  Preoxygenated: yes  Patient position: sniffing  Mask difficulty assessment: 1 - vent by mask  Planned trial extubation    Final Airway Details  Final airway type: endotracheal airway      Successful airway: ETT  Cuffed: yes   Successful intubation technique: direct laryngoscopy  Facilitating devices/methods: intubating stylet  Endotracheal tube insertion site: oral  Blade: Ignacio  Blade size: #4  ETT size (mm): 7.5  Cormack-Lehane Classification: grade IIb - view of arytenoids or posterior of glottis only  Placement verified by: chest auscultation   Measured from: teeth  ETT to teeth (cm): 22  Number of attempts at approach: 1    Additional Comments  Lips/teeth in preanesthetic condition. LTA kit used.

## 2025-02-26 ASSESSMENT — DERMATOLOGY QUALITY OF LIFE (QOL) ASSESSMENT
RATE HOW BOTHERED YOU ARE BY EFFECTS OF YOUR SKIN PROBLEMS ON YOUR ACTIVITIES (EG, GOING OUT, ACCOMPLISHING WHAT YOU WANT, WORK ACTIVITIES OR YOUR RELATIONSHIPS WITH OTHERS): 0 - NEVER BOTHERED
WHAT SINGLE SKIN CONDITION LISTED BELOW IS THE PATIENT ANSWERING THE QUALITY-OF-LIFE ASSESSMENT QUESTIONS ABOUT: NONE OF THE ABOVE
RATE HOW EMOTIONALLY BOTHERED YOU ARE BY YOUR SKIN PROBLEM (FOR EXAMPLE, WORRY, EMBARRASSMENT, FRUSTRATION): 0 - NEVER BOTHERED
RATE HOW BOTHERED YOU ARE BY SYMPTOMS OF YOUR SKIN PROBLEM (EG, ITCHING, STINGING BURNING, HURTING OR SKIN IRRITATION): 1
RATE HOW EMOTIONALLY BOTHERED YOU ARE BY YOUR SKIN PROBLEM (FOR EXAMPLE, WORRY, EMBARRASSMENT, FRUSTRATION): 0 - NEVER BOTHERED
WHAT SINGLE SKIN CONDITION LISTED BELOW IS THE PATIENT ANSWERING THE QUALITY-OF-LIFE ASSESSMENT QUESTIONS ABOUT: NONE OF THE ABOVE
DATE THE QUALITY-OF-LIFE ASSESSMENT WAS COMPLETED: 67262
RATE HOW BOTHERED YOU ARE BY SYMPTOMS OF YOUR SKIN PROBLEM (EG, ITCHING, STINGING BURNING, HURTING OR SKIN IRRITATION): 1
RATE HOW BOTHERED YOU ARE BY EFFECTS OF YOUR SKIN PROBLEMS ON YOUR ACTIVITIES (EG, GOING OUT, ACCOMPLISHING WHAT YOU WANT, WORK ACTIVITIES OR YOUR RELATIONSHIPS WITH OTHERS): 0 - NEVER BOTHERED

## 2025-02-26 ASSESSMENT — PATIENT GLOBAL ASSESSMENT (PGA): WHAT IS THE PGA: PATIENT GLOBAL ASSESSMENT:  2 - MILD

## 2025-03-03 LAB
LAB AP ASR DISCLAIMER: NORMAL
LABORATORY COMMENT REPORT: NORMAL
PATH REPORT.COMMENTS IMP SPEC: NORMAL
PATH REPORT.FINAL DX SPEC: NORMAL
PATH REPORT.GROSS SPEC: NORMAL
PATH REPORT.RELEVANT HX SPEC: NORMAL
PATH REPORT.TOTAL CANCER: NORMAL
RESIDENT REVIEW: NORMAL

## 2025-03-03 NOTE — TELEPHONE ENCOUNTER
2025       Nancy Bradley DO  [address] [place] [address]  Via In Basket      Patient: Jeannine Hobson   YOB: 2000   Date of Visit: 3/3/2025       Dear Dr. Bradley:    I saw your patient, Jeannine Hobson, for an evaluation. Below are my notes for this visit with her.    If you have questions, please do not hesitate to call me.      Sincerely,        Babatunde Lopez MD        CC: No Recipients  Babatunde Lopez MD  3/7/2025  1:39 PM  Signed  RHEUMATOLOGY FOLLOW UP VISIT    Name: Jeannine Hobson  : 2000     Referred by: Nancy Bradley DO    Chief Complaint   Patient presents with   • Rheumatoid Arthritis     1 month follow up.  Pain in Left elbow to wrist 7/10.       HISTORY OF PRESENT ILLNESS    25-year-old female presents for follow-up of rheumatoid arthritis.  She has high titer rheumatoid factor  70 and high titer anti- consistent with rheumatoid arthritis.  Also has elevated ESR 68 mm/h.    Experiencing joint symptoms since 2024. Developed flexion contractures in both elbows due to delayed medical attention. Methotrexate 15 mg weekly effective for inflammation and flare-ups, minimal pain relief. No shoulder pain, discomfort in left elbow and hand, pain in knee, ankle, toes, neck, and back. No morning stiffness since starting medication. No nausea, vomiting, diarrhea, or hair loss from methotrexate. Employed as CNA, capable of self-administering subcutaneous injections every 2 weeks if necessary. No alcohol consumption. Slight weight gain (3-4 pounds). Current treatment: methotrexate 6 tablets weekly, prednisone, folic acid.            REVIEW OF SYSTEMS  Constitutional: no fevers or chills 4 lb weight loss  EYES: No dry eyes, no blurred vision, no diplopia, no history of iritis  ENT:  no dry mouth,no oral ulcers, no dysphagia, no chronic sinus disease   Cardiovascular: no chest pain orthopnea no PND no  IEL pt  Pt REFUSED   palpitations  Respiratory: no cough no shortness of breath no wheezing no stridor no dyspnea on exertion  GI: no nausea, no vomiting, no diarrhea, no constipation no heartburn, no abdominal pain  :no dysuria urgency no hematuria  ALMAS: as above no raynauds  Heme:no history of deep venous thrombosis or pulmonary embolism no anemia, no swollen glands  CNS: no parasthesias,no weakness, no ataxia, no tremors, no dizziness  PSY :no anxiety or depression  INTEGUMENTARY: no hair loss, no rashes  ENDO: No polyuria, no polydipsia, not cushingoid    Past Medical History:   Diagnosis Date   • Anemia    • Asthma (CMD)         Past Surgical History:   Procedure Laterality Date   •  delivery+postpartum care         • No past surgeries         Social History     Tobacco Use   • Smoking status: Never   • Smokeless tobacco: Never   Vaping Use   • Vaping status: never used   Substance Use Topics   • Alcohol use: Not Currently     Comment: rare   • Drug use: Not Currently     Types: Marijuana     Comment: Last smoked 25       Current Outpatient Medications   Medication Sig Dispense Refill   • methotrexate (RHEUMATREX) 2.5 MG tablet Take 8 tablets by mouth 1 day a week. 103 tablet 0   • folic acid (FOLATE) 1 MG tablet Take 1 tablet by mouth daily. 90 tablet 3   • methylPREDNISolone (MEDROL) 4 MG tablet Take 1 tablet by mouth daily. 30 tablet 1   • etonogestrel (Nexplanon) 68 MG implant 68 mg by Subdermal route every 3 years. Indications: Birth Control Treatment NDC   08416-725-18  EXP    2025  LOT#   [id number]     • ferrous sulfate 325 (65 FE) MG tablet Take 1 tablet by mouth daily (with breakfast). 30 tablet 6     No current facility-administered medications for this visit.           PHYSICAL EXAM    Vitals:    25 1416   BP: 119/74   BP Location: RUE - Right upper extremity   Patient Position: Sitting   Cuff Size: Regular   Pulse: 88   Resp: 16   Temp: 98.9 °F (37.2 °C)   TempSrc: Oral   SpO2: 98%    Weight: 76.1 kg (167 lb 12.3 oz)   Height: 5' 2\" (1.575 m)   PainSc: 7    PainLoc: Elbow   LMP: 02/01/2025     Body mass index is 30.69 kg/m².        Skin: no ulcers, no malar rash, no petechia no purpura.    Head and Face: Atraumatic no deformities normocephalic normal facies  Eyes: Pink conjunctiva, anicteric sclera, no periorbital swelling, no ptosis, pupils reactive to light, extraocular muscles intact.  No dry eyes.    ENT: No oral ulcers, no nasal ulcers,  no sinus tenderness, no malar rash, no temporal artery tenderness, no oral thrush, no dry mouth, no oral ulcers.  No TMJ tenderness     Neck: Fairly good range of motion of C-spine, no paracervical tenderness, no goiter, no adenopathy, no supraclavicular masses.    Cardiac exam: S1-S2 regular, no murmurs.    Lungs: clear, no rales or wheezing, no abnormal breath sounds, good breath sounds bilaterally.    Abdomen: no hepatomegaly or splenomegaly or tenderness, no masses, no ascites.    Back: no SI joint tenderness, no paralumbar tenderness,   Musculoskeletal exam:  Right shoulder abduction 180 degrees with no tenderness.  Left shoulder abduction 150 degrees with tenderness  Right elbow with flexion contracture deformity but no tenderness or warmth  Maximum extension of the right elbow is about 160 degrees  She has tenderness and swelling with severe contracture deformity of 90 degrees of the left elbow, left elbow with flexion contracture deformity but no tenderness or swelling.  Right wrist no synovitis or tenderness, left wrist with  tenderness and  synovitis   No tenderness of left MCP joints  Right MCP joints no tenderness  Bilateral PIP joints no synovitis or tenderness, no sclerodactyly, no digital ulcers, no digital pits, normal nails, no raynauds, no dactylitis  Bilateral knees no effusion no tenderness no warmth, bilateral knees puffy  Bilateral ankles no synovitis or tenderness  Bilateral MTP joints no synovitis or tenderness, no  dactylitis  Bilateral hip joints fairly good range of motion with no tenderness           Neurological exam: Normal gait, normal motor strength in upper and lower extremity, normal muscle tone, no tremors alert oriented x3.  good bilateral hand , no muscle atrophy.    Pain scale 7 out of 10  Tender joints 2  Swollen joints 1  ESR is 61 mm/h  JUARES 28 5.33 consistent with high activity      LABs  Lab Services on 03/03/2025   Component Date Value Ref Range Status   • Cholesterol 03/03/2025 180  <=189 mg/dL Final      Desirable         <190  Borderline High   190 to 224  High              >=225   • Triglycerides 03/03/2025 132 (H)  <=114 mg/dL Final      Desirable         <115  Borderline High   115 to 149  High              >=150   • HDL 03/03/2025 50  >=46 mg/dL Final      Low              <40  Borderline Low   40 to 45  Optimal          >45   • LDL 03/03/2025 104  <=119 mg/dL Final      Desirable         <120  Borderline High   120 to 159  High              >=160   • Non-HDL Cholesterol 03/03/2025 130  <=149 mg/dL Final    Desirable         <150  Borderline High   150 to 189  High              >=190   • Cholesterol/ HDL Ratio 03/03/2025 3.6  <=4.4 Final   • TSH 03/03/2025 0.910  0.350 - 5.000 mcUnits/mL Final    Findings most consistent with euthyroid state, no additional testing suggested. TSH may be normal in patients with thyroid dysfunction and pituitary disease. Clinical correlation recommended.    (Reflex TSH algorithm is not recommended in hospitalized patients. A variety of drugs, as well as serious acute and chronic illnesses may alter thyroid function tests. Commonly implicated drugs include glucocorticoids, dopamine, carbamazepine, iodine, amiodarone, lithium and heparin.)  Reference range in pregnancy (if applicable):  First Trimester 0.1 - 4.0 mcUnits/mL  Second Trimester 0.2 - 4.0 mcUnits/mL  Third Trimester 0.3 - 4.5 mcUnits/mL     • Hepatitis B Surface Antibody 03/03/2025 Negative   Final   •  Hepatitis B Surface Antigen 03/03/2025 Negative  Negative Final   • Hepatitis B Core Antibody, IgG And* 03/03/2025 Negative  Negative Final   • Hepatitis B Interpretation 03/03/2025    Final    No evidence of Hepatitis B infection.   • Hepatitis C Antibody 03/03/2025 Negative  Negative Final   • Quantiferon Plus Interpretation 03/03/2025 Negative  Negative Final    M. tuberculosis infection unlikely.  See Directory of Services for Interpretive Information on Quantiferon TB Gold Plus numeric results.   • PNIL 03/03/2025 0.07  IU/mL Final   • P TB Antigen1-NIL 03/03/2025 0.00  IU/mL Final   • P TB Antigen2-NIL 03/03/2025 0.00  IU/mL Final   • P Mitogen-NIL 03/03/2025 1.53  IU/mL Final   • C-Reactive Protein 03/03/2025 15.7 (H)  <10.0 mg/L Final   • RBC Sedimentation Rate 03/03/2025 61 (H)  0 - 20 mm/hr Final   • Fasting Status 03/03/2025 0  0 - 999 Hours Final   • Sodium 03/03/2025 138  135 - 145 mmol/L Final   • Potassium 03/03/2025 4.3  3.4 - 5.1 mmol/L Final   • Chloride 03/03/2025 105  97 - 110 mmol/L Final   • Carbon Dioxide 03/03/2025 27  21 - 32 mmol/L Final   • Anion Gap 03/03/2025 10  7 - 19 mmol/L Final   • Glucose 03/03/2025 89  70 - 99 mg/dL Final   • BUN 03/03/2025 11  6 - 20 mg/dL Final   • Creatinine 03/03/2025 0.57  0.51 - 0.95 mg/dL Final   • Glomerular Filtration Rate 03/03/2025 >90  >=60 Final    eGFR results = or >60 mL/min/1.73m2 = Normal kidney function. Estimated GFR calculated using the CKD-EPI-R (2021) equation that does not include race in the creatinine calculation.   • BUN/Cr 03/03/2025 19  7 - 25 Final   • Calcium 03/03/2025 9.6  8.4 - 10.2 mg/dL Final   • Bilirubin, Total 03/03/2025 0.4  0.2 - 1.0 mg/dL Final   • GOT/AST 03/03/2025 15  <=37 Units/L Final   • GPT/ALT 03/03/2025 14  <64 Units/L Final   • Alkaline Phosphatase 03/03/2025 82  45 - 117 Units/L Final   • Albumin 03/03/2025 3.7  3.4 - 5.0 g/dL Final   • Protein, Total 03/03/2025 8.1  6.4 - 8.2 g/dL Final   • Globulin 03/03/2025  4.4 (H)  2.0 - 4.0 g/dL Final   • A/G Ratio 03/03/2025 0.8 (L)  1.0 - 2.4 Final   • WBC 03/03/2025 3.0 (L)  4.2 - 11.0 K/mcL Final   • RBC 03/03/2025 4.65  4.00 - 5.20 mil/mcL Final   • HGB 03/03/2025 11.3 (L)  12.0 - 15.5 g/dL Final   • HCT 03/03/2025 37.6  36.0 - 46.5 % Final   • MCV 03/03/2025 80.9  78.0 - 100.0 fl Final   • MCH 03/03/2025 24.3 (L)  26.0 - 34.0 pg Final   • MCHC 03/03/2025 30.1 (L)  32.0 - 36.5 g/dL Final   • RDW-CV 03/03/2025 17.3 (H)  11.0 - 15.0 % Final   • RDW-SD 03/03/2025 50.1 (H)  39.0 - 50.0 fL Final   • PLT 03/03/2025 376  140 - 450 K/mcL Final   • NRBC 03/03/2025 0  <=0 /100 WBC Final   • Neutrophil, Percent 03/03/2025 50  % Final   • Lymphocytes, Percent 03/03/2025 40  % Final   • Mono, Percent 03/03/2025 8  % Final   • Eosinophils, Percent 03/03/2025 1  % Final   • Basophils, Percent 03/03/2025 1  % Final   • Immature Granulocytes 03/03/2025 0  % Final   • Absolute Neutrophils 03/03/2025 1.5 (L)  1.8 - 7.7 K/mcL Final   • Absolute Lymphocytes 03/03/2025 1.2  1.0 - 4.8 K/mcL Final   • Absolute Monocytes 03/03/2025 0.2 (L)  0.3 - 0.9 K/mcL Final   • Absolute Eosinophils  03/03/2025 0.0  0.0 - 0.5 K/mcL Final   • Absolute Basophils 03/03/2025 0.0  0.0 - 0.3 K/mcL Final   • Absolute Immature Granulocytes 03/03/2025 0.0  0.0 - 0.2 K/mcL Final               ASSESSMENT/PLAN             Orders Placed This Encounter   • Hepatitis Serology Panel Chronic with Reflex HCV PCR   • Quantiferon TB Plus   • C Reactive Protein   • Sedimentation Rate   • Comprehensive Metabolic Panel   • CBC with Automated Differential   • methotrexate (RHEUMATREX) 2.5 MG tablet       1. Rheumatoid Arthritis: Much better controlled now on methotrexate 15 mg weekly  - Joint symptoms since April 2024, developed flexion contractures in both elbows. Currently taking methotrexate 6 tablets per week without side effects. Continues to experience pain in left elbow, hand, and wrist. High titer rheumatoid factor and anti-CCP  consistent with rheumatoid arthritis. Previous rheumatoid factor: 70, anti-CCP: 272, elevated ESR: 63. L  - Increase methotrexate to 8 tablets per week.  - Avoid alcohol.  Warned against pregnancy once again as she is on methotrexate  - Ensure consistent use of birth control due to methotrexate risks.  - Prescription for methotrexate sent to pharmacy.  Will likely need to be started on biological therapy as she still has high disease activity    - Undergo hepatitis screening, QuantiFERON-TB Gold test, CBC, CMP, ESR, and CRP tests today.  Will order additional labs as we may need to start on biosimilar's of Humira  She was given a booklet on Humira biosimilar  Address this on her next visit and will review side effect profile in great detail then    Follow-up  - Follow up in 1 month via video visit.      Risks of medical conditions and side effects of medication were discussed.  Medical compliance with plan discussed and risks of non-compliance reviewed.    Patient education completed on disease process, etiology & prognosis.    Patient expresses understanding of the plan.    Return to clinic as clinically indicated as discussed with patient who verbalized understanding of & agreement with the plan.       Babatunde Lopez MD

## 2025-03-05 ENCOUNTER — APPOINTMENT (OUTPATIENT)
Dept: DERMATOLOGY | Facility: CLINIC | Age: 80
End: 2025-03-05
Payer: COMMERCIAL

## 2025-03-05 DIAGNOSIS — L57.0 ACTINIC KERATOSIS: Primary | ICD-10-CM

## 2025-03-05 DIAGNOSIS — Z12.83 ENCOUNTER FOR SCREENING FOR MALIGNANT NEOPLASM OF SKIN: ICD-10-CM

## 2025-03-05 DIAGNOSIS — L21.9 SEBORRHEIC DERMATITIS: ICD-10-CM

## 2025-03-05 DIAGNOSIS — D22.5 MELANOCYTIC NEVUS OF TRUNK: ICD-10-CM

## 2025-03-05 DIAGNOSIS — D18.01 HEMANGIOMA OF SKIN: ICD-10-CM

## 2025-03-05 DIAGNOSIS — L57.8 DIFFUSE PHOTODAMAGE OF SKIN: ICD-10-CM

## 2025-03-05 DIAGNOSIS — Z85.828 HISTORY OF NONMELANOMA SKIN CANCER: ICD-10-CM

## 2025-03-05 DIAGNOSIS — L82.1 SEBORRHEIC KERATOSIS: ICD-10-CM

## 2025-03-05 PROCEDURE — 99214 OFFICE O/P EST MOD 30 MIN: CPT | Performed by: DERMATOLOGY

## 2025-03-05 PROCEDURE — 17004 DESTROY PREMAL LESIONS 15/>: CPT | Performed by: DERMATOLOGY

## 2025-03-05 PROCEDURE — 1159F MED LIST DOCD IN RCRD: CPT | Performed by: DERMATOLOGY

## 2025-03-05 RX ORDER — KETOCONAZOLE 20 MG/G
CREAM TOPICAL
Qty: 60 G | Refills: 11 | Status: SHIPPED | OUTPATIENT
Start: 2025-03-05

## 2025-03-05 NOTE — PROGRESS NOTES
Subjective     Tomas Morataya is a 80 y.o. male who presents for the following: Skin Check.  He notes dry, scaly patches on his face recently, especially between his eyebrows and around his nose.  He denies any new, changing, or concerning skin lesions since his last visit; no bleeding, itching, or burning lesions.      Review of Systems:  No other skin or systemic complaints other than what is documented elsewhere in the note.    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 Gordon on 3/27/19  - SCC on right ear helix diagnosed in September 2013 s/p Mohs surgery by Dr. Morin  - AKs   - stasis dermatitis with early lipodermatosclerosis   - Tinea pedis  - no h/o melanoma    Family History:    No family history of melanoma or skin cancer    Social History:    The patient works as the  for local [employer]    Allergies:  Cefaclor, Cocoa, and Penicillins    Current Medications / CAM's:    Current Outpatient Medications:     acetaminophen (Tylenol) 500 mg tablet, Take 650 mg by mouth every 6 hours if needed for mild pain (1 - 3)., Disp: , Rfl:     amLODIPine (Norvasc) 10 mg tablet, Take 1 tablet (10 mg) by mouth once daily., Disp: 90 tablet, Rfl: 1    aspirin 81 mg EC tablet, Take 1 tablet (81 mg) by mouth once daily., Disp: , Rfl:     atorvastatin (Lipitor) 40 mg tablet, Take 1 tablet (40 mg) by mouth once daily.,  Disp: 90 tablet, Rfl: 1    cyanocobalamin, vitamin B-12, (Vitamin B-12) 1,000 mcg tablet extended release, Take 1 tablet (1,000 mcg) by mouth once daily., Disp: , Rfl:     doxazosin (Cardura) 4 mg tablet, Take 1 tablet (4 mg) by mouth once daily., Disp: 90 tablet, Rfl: 1    folic acid (Folvite) 1 mg tablet, Take 1 tablet (1 mg) by mouth once daily., Disp: , Rfl:     hydroCHLOROthiazide (HYDRODiuril) 25 mg tablet, Take 1 tablet (25 mg) by mouth once daily., Disp: 90 tablet, Rfl: 1    loratadine (Claritin) 10 mg tablet, Take 1 tablet (10 mg) by mouth once daily., Disp: , Rfl:     losartan (Cozaar) 100 mg tablet, Take 1 tablet (100 mg) by mouth once daily., Disp: 90 tablet, Rfl: 1    metFORMIN (Glucophage) 1,000 mg tablet, Take 1 tablet (1,000 mg) by mouth 2 times a day., Disp: 180 tablet, Rfl: 1    multivitamin tablet, Take by mouth., Disp: , Rfl:     pioglitazone (Actos) 15 mg tablet, Take 1 tablet (15 mg) by mouth once daily., Disp: 90 tablet, Rfl: 1    spironolactone (Aldactone) 25 mg tablet, Take 1 tablet (25 mg) by mouth once daily., Disp: 90 tablet, Rfl: 1    triamcinolone (Nasacort) 55 mcg nasal inhaler, Administer 1-2 sprays into each nostril once daily., Disp: , Rfl:     esomeprazole (NexIUM) 40 mg DR capsule, Take 1 capsule (40 mg) by mouth once daily in the morning. Take before meals for 5 days. Do not open capsule., Disp: 5 capsule, Rfl: 0    ketoconazole (NIZOral) 2 % cream, Apply twice daily to affected areas of face, Disp: 60 g, Rfl: 11    omeprazole (PriLOSEC) 10 mg DR capsule, Take 1 capsule (10 mg) by mouth once daily in the morning. Take before meals for 5 days. Do not crush or chew., Disp: 5 capsule, Rfl: 0    sulfamethoxazole-trimethoprim (Bactrim) 400-80 mg tablet, Take 1 tablet by mouth 2 times a day for 10 days., Disp: 20 tablet, Rfl: 0     Objective   Well appearing patient in no apparent distress; mood and affect are within normal limits.    A waist-up examination was performed including  scalp, face, eyes, ears, nose, lips, neck, chest, axillae, abdomen, back, and bilateral upper extremities. All findings within normal limits unless otherwise noted below.        Assessment/Plan   1. Actinic keratosis (19)  Head - Anterior (Face) (19)  Scattered on the patient's scalp, there are multiple erythematous, gritty, scaly macules     Actinic Keratoses - scattered on scalp.  The pre-cancerous nature of these lesions and treatment options were discussed with the patient today.  At this time, we recommend treatment with liquid nitrogen cryotherapy.  The patient expressed understanding, is in agreement with this plan, and wishes to proceed with cryotherapy today.    Destr of lesion - Head - Anterior (Face) (19)  Complexity: simple    Destruction method: cryotherapy    Informed consent: discussed and consent obtained    Lesion destroyed using liquid nitrogen: Yes    Cryotherapy cycles:  1  Outcome: patient tolerated procedure well with no complications    Post-procedure details: wound care instructions given      2. Seborrheic dermatitis  Head - Anterior (Face)  On the patient's face, mainly the glabella and bilateral eyebrows and perinasal creases, there are pink, scaly patches with whitish-yellowish, greasy scale    Seborrheic Dermatitis - flare on face.  The potentially chronic and intermittently flaring nature of this condition and treatment options were discussed extensively with the patient today.  At this time, we recommend topical anti-fungal therapy with Ketoconazole 2% cream, which the patient was instructed to apply twice daily to the affected areas of the face.  The risks, benefits, and side effects of this medication were discussed.  The patient expressed understanding and is in agreement with this plan.    ketoconazole (NIZOral) 2 % cream - Head - Anterior (Face)  Apply twice daily to affected areas of face    3. Melanocytic nevus of trunk  Scattered on the patient's face, neck, trunk, and  bilateral upper extremities, there are several small, round- to oval-shaped, brown-pigmented and pink-colored, symmetric, uniform-appearing macules and dome-shaped papules    Clinically benign- to slightly atypical-appearing nevi - the clinically benign- to slightly atypical-appearing nature of the patient's nevi was discussed with the patient today.  None of the patient's nevi meet threshold for biopsy today.  We emphasized the importance of performing monthly self-skin exams using the ABCDs of monitoring moles, which were reviewed with the patient today. We also emphasized the importance of sun avoidance and sun protection with daily sunscreen use.  The patient expressed understanding and is in agreement with this plan.    4. Seborrheic keratosis  Scattered on the patient's face, neck, trunk, and bilateral upper extremities, there are multiple tan- to light brown-colored, hyperkeratotic, stuck-on appearing papules of varying size and shape    Seborrheic Keratoses - the benign nature of these lesions was discussed with the patient today and reassurance provided.  No treatment is medically indicated for these lesions at this time.    5. Hemangioma of skin  Scattered on the patient's face, neck, trunk, and bilateral upper extremities, there are multiple small, round, cherry red- to purplish-colored, symmetric, uniform, vascular-appearing macules and papules    Cherry Angiomas - the benign nature of these vascular lesions was discussed with the patient today and reassurance provided.  No treatment is medically indicated for these lesions at this time.    6. History of nonmelanoma skin cancer  Right Ear  On the patient's right anterior vertex scalp, right ear helix, and left ear mid posterior helix, there are well-healed scars with no evidence of recurrent growth on exam today.    History of squamous cell carcinomas and actinic keratoses and photodamage.  There is no evidence of recurrence at the sites of the patient's  previously treated SCCs on exam today.  The signs and symptoms of skin cancer were reviewed and the patient was advised to practice sun protection and sun avoidance, use daily sunscreen, and perform regular self skin exams.  We will have the patient return to our office in 4 to 6 months for routine follow-up and skin exam, and the patient was instructed to call our office should the patient notice any new, changing, symptomatic, or otherwise concerning skin lesions before then.  The patient expressed understanding and is in agreement with this plan.    7. Diffuse photodamage of skin  Head - Anterior (Face)  Diffuse photodamage with actinic changes with telangiectasia and mottled pigmentation in sun-exposed areas.    Photodamage.  The signs and symptoms of skin cancer were reviewed and the patient was advised to practice sun protection and sun avoidance, use daily sunscreen, and perform regular self skin exams.  Sun protection was discussed, including avoiding the mid-day sun, wearing a sunscreen with SPF at least 50, and stressing the need for reapplication of sunscreen and applying more than they think they need.    8. Encounter for screening for malignant neoplasm of skin        Seen and discussed with attending physician Dr. Sisi Conde MD, PhD  Resident, Dermatology       I saw and evaluated the patient. I personally obtained the key and critical portions of the history and physical exam or was physically present for key and critical portions performed by the resident/fellow. I reviewed the resident/fellow's documentation and discussed the patient with the resident/fellow. I agree with the resident/fellow's medical decision making as documented in the note.    Yang Laird MD

## 2025-03-08 ENCOUNTER — HOSPITAL ENCOUNTER (EMERGENCY)
Facility: HOSPITAL | Age: 80
Discharge: HOME | End: 2025-03-08
Attending: EMERGENCY MEDICINE
Payer: COMMERCIAL

## 2025-03-08 VITALS
DIASTOLIC BLOOD PRESSURE: 78 MMHG | TEMPERATURE: 97.9 F | HEART RATE: 101 BPM | SYSTOLIC BLOOD PRESSURE: 147 MMHG | OXYGEN SATURATION: 100 % | RESPIRATION RATE: 16 BRPM

## 2025-03-08 DIAGNOSIS — N49.2 CELLULITIS, SCROTUM: Primary | ICD-10-CM

## 2025-03-08 LAB
ALBUMIN SERPL BCP-MCNC: 3.2 G/DL (ref 3.4–5)
ALP SERPL-CCNC: 50 U/L (ref 33–136)
ALT SERPL W P-5'-P-CCNC: 15 U/L (ref 10–52)
ANION GAP SERPL CALC-SCNC: 12 MMOL/L (ref 10–20)
APPEARANCE UR: CLEAR
AST SERPL W P-5'-P-CCNC: 15 U/L (ref 9–39)
BASOPHILS # BLD AUTO: 0.05 X10*3/UL (ref 0–0.1)
BASOPHILS NFR BLD AUTO: 0.6 %
BILIRUB SERPL-MCNC: 0.4 MG/DL (ref 0–1.2)
BILIRUB UR STRIP.AUTO-MCNC: NEGATIVE MG/DL
BUN SERPL-MCNC: 19 MG/DL (ref 6–23)
CALCIUM SERPL-MCNC: 8.7 MG/DL (ref 8.6–10.3)
CHLORIDE SERPL-SCNC: 104 MMOL/L (ref 98–107)
CO2 SERPL-SCNC: 23 MMOL/L (ref 21–32)
COLOR UR: ABNORMAL
CREAT SERPL-MCNC: 0.72 MG/DL (ref 0.5–1.3)
EGFRCR SERPLBLD CKD-EPI 2021: >90 ML/MIN/1.73M*2
EOSINOPHIL # BLD AUTO: 0.13 X10*3/UL (ref 0–0.4)
EOSINOPHIL NFR BLD AUTO: 1.4 %
ERYTHROCYTE [DISTWIDTH] IN BLOOD BY AUTOMATED COUNT: 14.6 % (ref 11.5–14.5)
GLUCOSE SERPL-MCNC: 117 MG/DL (ref 74–99)
GLUCOSE UR STRIP.AUTO-MCNC: NORMAL MG/DL
HCT VFR BLD AUTO: 35 % (ref 41–52)
HGB BLD-MCNC: 11.3 G/DL (ref 13.5–17.5)
HOLD SPECIMEN: NORMAL
IMM GRANULOCYTES # BLD AUTO: 0.03 X10*3/UL (ref 0–0.5)
IMM GRANULOCYTES NFR BLD AUTO: 0.3 % (ref 0–0.9)
KETONES UR STRIP.AUTO-MCNC: NEGATIVE MG/DL
LACTATE SERPL-SCNC: 2.3 MMOL/L (ref 0.4–2)
LEUKOCYTE ESTERASE UR QL STRIP.AUTO: ABNORMAL
LYMPHOCYTES # BLD AUTO: 1.47 X10*3/UL (ref 0.8–3)
LYMPHOCYTES NFR BLD AUTO: 16.3 %
MCH RBC QN AUTO: 28.9 PG (ref 26–34)
MCHC RBC AUTO-ENTMCNC: 32.3 G/DL (ref 32–36)
MCV RBC AUTO: 90 FL (ref 80–100)
MONOCYTES # BLD AUTO: 1.03 X10*3/UL (ref 0.05–0.8)
MONOCYTES NFR BLD AUTO: 11.4 %
MUCOUS THREADS #/AREA URNS AUTO: ABNORMAL /LPF
NEUTROPHILS # BLD AUTO: 6.29 X10*3/UL (ref 1.6–5.5)
NEUTROPHILS NFR BLD AUTO: 70 %
NITRITE UR QL STRIP.AUTO: NEGATIVE
NRBC BLD-RTO: 0 /100 WBCS (ref 0–0)
PH UR STRIP.AUTO: 5.5 [PH]
PLATELET # BLD AUTO: 249 X10*3/UL (ref 150–450)
POTASSIUM SERPL-SCNC: 4.3 MMOL/L (ref 3.5–5.3)
PROT SERPL-MCNC: 5.9 G/DL (ref 6.4–8.2)
PROT UR STRIP.AUTO-MCNC: NEGATIVE MG/DL
RBC # BLD AUTO: 3.91 X10*6/UL (ref 4.5–5.9)
RBC # UR STRIP.AUTO: ABNORMAL MG/DL
RBC #/AREA URNS AUTO: >20 /HPF
SODIUM SERPL-SCNC: 135 MMOL/L (ref 136–145)
SP GR UR STRIP.AUTO: 1.01
SQUAMOUS #/AREA URNS AUTO: ABNORMAL /HPF
UROBILINOGEN UR STRIP.AUTO-MCNC: NORMAL MG/DL
WBC # BLD AUTO: 9 X10*3/UL (ref 4.4–11.3)
WBC #/AREA URNS AUTO: ABNORMAL /HPF

## 2025-03-08 PROCEDURE — 81003 URINALYSIS AUTO W/O SCOPE: CPT | Performed by: EMERGENCY MEDICINE

## 2025-03-08 PROCEDURE — 84075 ASSAY ALKALINE PHOSPHATASE: CPT | Performed by: EMERGENCY MEDICINE

## 2025-03-08 PROCEDURE — 83605 ASSAY OF LACTIC ACID: CPT | Performed by: EMERGENCY MEDICINE

## 2025-03-08 PROCEDURE — 99284 EMERGENCY DEPT VISIT MOD MDM: CPT | Performed by: EMERGENCY MEDICINE

## 2025-03-08 PROCEDURE — 87086 URINE CULTURE/COLONY COUNT: CPT | Mod: PORLAB | Performed by: EMERGENCY MEDICINE

## 2025-03-08 PROCEDURE — 85025 COMPLETE CBC W/AUTO DIFF WBC: CPT | Performed by: EMERGENCY MEDICINE

## 2025-03-08 PROCEDURE — 96360 HYDRATION IV INFUSION INIT: CPT

## 2025-03-08 PROCEDURE — 2500000004 HC RX 250 GENERAL PHARMACY W/ HCPCS (ALT 636 FOR OP/ED): Performed by: EMERGENCY MEDICINE

## 2025-03-08 PROCEDURE — 36415 COLL VENOUS BLD VENIPUNCTURE: CPT | Performed by: EMERGENCY MEDICINE

## 2025-03-08 PROCEDURE — 87040 BLOOD CULTURE FOR BACTERIA: CPT | Mod: PORLAB | Performed by: EMERGENCY MEDICINE

## 2025-03-08 PROCEDURE — 81001 URINALYSIS AUTO W/SCOPE: CPT | Performed by: EMERGENCY MEDICINE

## 2025-03-08 PROCEDURE — 96361 HYDRATE IV INFUSION ADD-ON: CPT

## 2025-03-08 PROCEDURE — 2500000002 HC RX 250 W HCPCS SELF ADMINISTERED DRUGS (ALT 637 FOR MEDICARE OP, ALT 636 FOR OP/ED): Performed by: EMERGENCY MEDICINE

## 2025-03-08 RX ORDER — SULFAMETHOXAZOLE AND TRIMETHOPRIM 800; 160 MG/1; MG/1
1 TABLET ORAL ONCE
Status: COMPLETED | OUTPATIENT
Start: 2025-03-08 | End: 2025-03-08

## 2025-03-08 RX ORDER — SULFAMETHOXAZOLE AND TRIMETHOPRIM 400; 80 MG/1; MG/1
1 TABLET ORAL 2 TIMES DAILY
Qty: 20 TABLET | Refills: 0 | Status: SHIPPED | OUTPATIENT
Start: 2025-03-08 | End: 2025-03-18

## 2025-03-08 RX ADMIN — SODIUM CHLORIDE, POTASSIUM CHLORIDE, SODIUM LACTATE AND CALCIUM CHLORIDE 1000 ML: 600; 310; 30; 20 INJECTION, SOLUTION INTRAVENOUS at 04:01

## 2025-03-08 RX ADMIN — SULFAMETHOXAZOLE AND TRIMETHOPRIM 1 TABLET: 800; 160 TABLET ORAL at 06:13

## 2025-03-08 ASSESSMENT — LIFESTYLE VARIABLES
HAVE YOU EVER FELT YOU SHOULD CUT DOWN ON YOUR DRINKING: NO
HAVE PEOPLE ANNOYED YOU BY CRITICIZING YOUR DRINKING: NO
EVER FELT BAD OR GUILTY ABOUT YOUR DRINKING: NO
EVER HAD A DRINK FIRST THING IN THE MORNING TO STEADY YOUR NERVES TO GET RID OF A HANGOVER: NO
TOTAL SCORE: 0

## 2025-03-08 NOTE — DISCHARGE INSTRUCTIONS
Keep an eye on how this is changing. If the red area is getting bigger, the scabbed area is getting bigger, there is more drainage, or the drainage becomes thicker/yellow/green then please return to the ER. If you develop any fever, chills, or feel ill, please return.     If this is improving, then call Dr. Red on Monday for follow up Monday or Tuesday. If anything is worsening, please return to the ER for further evaluation.

## 2025-03-08 NOTE — ED PROVIDER NOTES
Tomas Morataya is a 80 y.o. patient presenting to the ED for     Additional History Obtained from:   Limitations to History:  ------------------------------------------------------------------------------------------------------------------------------------------  Physical Exam:  Appearance: Alert, cooperative,   Skin: Warm, dry, appropriate color for ethnicity.  Eyes: Cornea clear. No scleral icterus or injection.   ENT: Mucous membranes moist.  Pulmonary: No accessory muscle use or stridor. Clear lung sounds bilaterally without rhonchi or wheezing.   Cardiac: Heart sounds regular without murmur. B/L radial pulses full and symmetric.   Abdomen: Soft, not tender.  No rebound or guarding.   :   Musculoskeletal: No gross deformities.   Neurological: Face symmetrical. Voice clear. Appropriately conversant.   Psychiatric: Appropriate mood and affect.    Medical Decision Making:  Chronic Medical Conditions Significantly Affecting Care:  has a past medical history of Allergy to other foods, BPH (benign prostatic hyperplasia), Essential (primary) hypertension, Hyperlipidemia, Personal history of other endocrine, nutritional and metabolic disease, Personal history of other endocrine, nutritional and metabolic disease, and Type 2 diabetes mellitus.    Social Determinants of Health Significantly Affecting Care:    Differential Diagnosis Considered but not limited to:       External Records Reviewed:   I reviewed recent and relevant outside records including:     Independent Interpretation of Studies: The following studies were ordered as part of the emergency department work up and independently interpreted by me. See ED Course for details.           Escalation of Care:        Discussion of Management with Other Providers:   I discussed the patient/results with:     identified    Differential Diagnosis Considered but not limited to: Patient postop from hydrocele surgery with wound on the posterior aspect of his scrotum.  The patient is not able to see this area and discovered scabs in this area today when it began oozing.  It is unknown how long they have been there.  This is not at his surgical incision site.  He does not have any crepitus or purulence to suggest Nela's.  No palpable abscess or fluid collection.  Will obtain labs and discussed with urology.      External Records Reviewed:   I reviewed recent and relevant outside records including:     Independent Interpretation of Studies: The following studies were ordered as part of the emergency department work up and independently interpreted by me. See ED Course for details.    CBC with mild anemia and hemoglobin of 11.3.  Otherwise normal.  No bandemia.  CMP is within normal limits.  Lactate is minimally elevated at 2.3.  Urinalysis is positive for leukocyte esterase with RBCs and few WBCs.  No bacteria.    I discussed the case with Dr. Cardona, on-call for the patient's urologist.  He reviewed the image noted in the chart above.  We discussed my exam.  He is in agreement with treating this as cellulitis.  Given otherwise reassuring labs and exam does not feel transfer is necessary.  I am in agreement with this.    I discussed antibiotics and close follow-up plan with the patient and his wife at bedside.  I did show the patient's wife the area of scab and erythema.  She will monitor the area and bring the patient back to the emergency department immediately should it worsen in any way.    Diagnoses as of 03/21/25 0421   Cellulitis, scrotum              Melina Valles,   03/21/25 0434

## 2025-03-09 LAB
BACTERIA BLD CULT: NORMAL
BACTERIA BLD CULT: NORMAL
BACTERIA UR CULT: NORMAL

## 2025-03-10 NOTE — PROGRESS NOTES
[facility] [place]  Outpatient Clinic Note    Subjective   Tomas Morataya is a 80 y.o. male    History of Present Illness   Patient presenting to clinic today for FUV s/p Hydrocelectomy with Dr. Red 2/24/2025 and subsequent ED visit 3/8/2025 for evaluation of scrotal Cellulitis.   Started Bactrim BID for 10 days.   History bilateral hydrocelectomy and a bilateral spermatic cord block on 3/14/23   ED on Viagra. Today's visit, he is doing well overall. He denies fevers, chills, n/v, gross hematuria, or bothersome urinary symptoms. Scrotal cellulitis improving.     Past Medical History and Surgical History   Past Medical History:   Diagnosis Date    Allergy to other foods     History of food allergy    BPH (benign prostatic hyperplasia)     Essential (primary) hypertension     Benign essential hypertension    Hyperlipidemia     Personal history of other endocrine, nutritional and metabolic disease     History of hyperlipidemia    Personal history of other endocrine, nutritional and metabolic disease     History of diabetes mellitus    Type 2 diabetes mellitus      Past Surgical History:   Procedure Laterality Date    OTHER SURGICAL HISTORY  08/29/2019    Cyst excision    OTHER SURGICAL HISTORY  08/29/2019    Heart surgery       Medications  Current Outpatient Medications on File Prior to Visit   Medication Sig Dispense Refill    acetaminophen (Tylenol) 500 mg tablet Take 650 mg by mouth every 6 hours if needed for mild pain (1 - 3).      amLODIPine (Norvasc) 10 mg tablet Take 1 tablet (10 mg) by mouth once daily. 90 tablet 1    aspirin 81 mg EC tablet Take 1 tablet (81 mg) by mouth once daily.      atorvastatin (Lipitor) 40 mg tablet Take 1 tablet (40 mg) by mouth once daily. 90 tablet 1    cyanocobalamin, vitamin B-12, (Vitamin B-12) 1,000 mcg tablet extended release Take 1 tablet (1,000 mcg) by mouth once daily.      doxazosin (Cardura) 4 mg tablet Take 1 tablet (4 mg) by mouth once daily. 90 tablet 1     esomeprazole (NexIUM) 40 mg DR capsule Take 1 capsule (40 mg) by mouth once daily in the morning. Take before meals for 5 days. Do not open capsule. 5 capsule 0    folic acid (Folvite) 1 mg tablet Take 1 tablet (1 mg) by mouth once daily.      hydroCHLOROthiazide (HYDRODiuril) 25 mg tablet Take 1 tablet (25 mg) by mouth once daily. 90 tablet 1    ketoconazole (NIZOral) 2 % cream Apply twice daily to affected areas of face 60 g 11    loratadine (Claritin) 10 mg tablet Take 1 tablet (10 mg) by mouth once daily.      losartan (Cozaar) 100 mg tablet Take 1 tablet (100 mg) by mouth once daily. 90 tablet 1    metFORMIN (Glucophage) 1,000 mg tablet Take 1 tablet (1,000 mg) by mouth 2 times a day. 180 tablet 1    multivitamin tablet Take by mouth.      omeprazole (PriLOSEC) 10 mg DR capsule Take 1 capsule (10 mg) by mouth once daily in the morning. Take before meals for 5 days. Do not crush or chew. 5 capsule 0    pioglitazone (Actos) 15 mg tablet Take 1 tablet (15 mg) by mouth once daily. 90 tablet 1    spironolactone (Aldactone) 25 mg tablet Take 1 tablet (25 mg) by mouth once daily. 90 tablet 1    sulfamethoxazole-trimethoprim (Bactrim) 400-80 mg tablet Take 1 tablet by mouth 2 times a day for 10 days. 20 tablet 0    triamcinolone (Nasacort) 55 mcg nasal inhaler Administer 1-2 sprays into each nostril once daily.       No current facility-administered medications on file prior to visit.       Objective   Physicial Exam  General: Well developed, well nourished, alert and cooperative, appears in no acute distress  Eyes: Non-injected conjunctiva, sclera clear, no proptosis  Cardiac: Extremities are warm and well perfused. No edema, cyanosis or pallor.   Lungs: Breathing is easy, non-labored. Speaking in clear and complete sentences. Normal diaphragmatic movement.  MSK: Ambulatory with steady gait, unassisted  Neuro: alert and oriented to person, place and time  Psych: Demonstrates good judgement and reason, without  hallucinations, abnormal affect or abnormal behaviors.  Skin: no obvious lesions, no rashes.    Admission on 03/08/2025, Discharged on 03/08/2025   Component Date Value Ref Range Status    WBC 03/08/2025 9.0  4.4 - 11.3 x10*3/uL Final    nRBC 03/08/2025 0.0  0.0 - 0.0 /100 WBCs Final    RBC 03/08/2025 3.91 (L)  4.50 - 5.90 x10*6/uL Final    Hemoglobin 03/08/2025 11.3 (L)  13.5 - 17.5 g/dL Final    Hematocrit 03/08/2025 35.0 (L)  41.0 - 52.0 % Final    MCV 03/08/2025 90  80 - 100 fL Final    MCH 03/08/2025 28.9  26.0 - 34.0 pg Final    MCHC 03/08/2025 32.3  32.0 - 36.0 g/dL Final    RDW 03/08/2025 14.6 (H)  11.5 - 14.5 % Final    Platelets 03/08/2025 249  150 - 450 x10*3/uL Final    Neutrophils % 03/08/2025 70.0  40.0 - 80.0 % Final    Immature Granulocytes %, Automated 03/08/2025 0.3  0.0 - 0.9 % Final    Immature Granulocyte Count (IG) includes promyelocytes, myelocytes and metamyelocytes but does not include bands. Percent differential counts (%) should be interpreted in the context of the absolute cell counts (cells/UL).    Lymphocytes % 03/08/2025 16.3  13.0 - 44.0 % Final    Monocytes % 03/08/2025 11.4  2.0 - 10.0 % Final    Eosinophils % 03/08/2025 1.4  0.0 - 6.0 % Final    Basophils % 03/08/2025 0.6  0.0 - 2.0 % Final    Neutrophils Absolute 03/08/2025 6.29 (H)  1.60 - 5.50 x10*3/uL Final    Percent differential counts (%) should be interpreted in the context of the absolute cell counts (cells/uL).    Immature Granulocytes Absolute, Au* 03/08/2025 0.03  0.00 - 0.50 x10*3/uL Final    Lymphocytes Absolute 03/08/2025 1.47  0.80 - 3.00 x10*3/uL Final    Monocytes Absolute 03/08/2025 1.03 (H)  0.05 - 0.80 x10*3/uL Final    Eosinophils Absolute 03/08/2025 0.13  0.00 - 0.40 x10*3/uL Final    Basophils Absolute 03/08/2025 0.05  0.00 - 0.10 x10*3/uL Final    Glucose 03/08/2025 117 (H)  74 - 99 mg/dL Final    Sodium 03/08/2025 135 (L)  136 - 145 mmol/L Final    Potassium 03/08/2025 4.3  3.5 - 5.3 mmol/L Final     Chloride 03/08/2025 104  98 - 107 mmol/L Final    Bicarbonate 03/08/2025 23  21 - 32 mmol/L Final    Anion Gap 03/08/2025 12  10 - 20 mmol/L Final    Urea Nitrogen 03/08/2025 19  6 - 23 mg/dL Final    Creatinine 03/08/2025 0.72  0.50 - 1.30 mg/dL Final    eGFR 03/08/2025 >90  >60 mL/min/1.73m*2 Final    Calculations of estimated GFR are performed using the 2021 CKD-EPI Study Refit equation without the race variable for the IDMS-Traceable creatinine methods.  https://jasn.asnjournals.org/content/early/2021/09/22/ASN.9756282175    Calcium 03/08/2025 8.7  8.6 - 10.3 mg/dL Final    Albumin 03/08/2025 3.2 (L)  3.4 - 5.0 g/dL Final    Alkaline Phosphatase 03/08/2025 50  33 - 136 U/L Final    Total Protein 03/08/2025 5.9 (L)  6.4 - 8.2 g/dL Final    AST 03/08/2025 15  9 - 39 U/L Final    Bilirubin, Total 03/08/2025 0.4  0.0 - 1.2 mg/dL Final    ALT 03/08/2025 15  10 - 52 U/L Final    Patients treated with Sulfasalazine may generate falsely decreased results for ALT.    Lactate 03/08/2025 2.3 (H)  0.4 - 2.0 mmol/L Final    Blood Culture 03/08/2025 No growth at 2 days   Preliminary    Blood Culture 03/08/2025 No growth at 2 days   Preliminary    Color, Urine 03/08/2025 Light-Yellow  Light-Yellow, Yellow, Dark-Yellow Final    Appearance, Urine 03/08/2025 Clear  Clear Final    Specific Gravity, Urine 03/08/2025 1.011  1.005 - 1.035 Final    pH, Urine 03/08/2025 5.5  5.0, 5.5, 6.0, 6.5, 7.0, 7.5, 8.0 Final    Protein, Urine 03/08/2025 NEGATIVE  NEGATIVE, 10 (TRACE), 20 (TRACE) mg/dL Final    Glucose, Urine 03/08/2025 Normal  Normal mg/dL Final    Blood, Urine 03/08/2025 0.5 (2+) (A)  NEGATIVE mg/dL Final    Ketones, Urine 03/08/2025 NEGATIVE  NEGATIVE mg/dL Final    Bilirubin, Urine 03/08/2025 NEGATIVE  NEGATIVE mg/dL Final    Urobilinogen, Urine 03/08/2025 Normal  Normal mg/dL Final    Nitrite, Urine 03/08/2025 NEGATIVE  NEGATIVE Final    Leukocyte Esterase, Urine 03/08/2025 250 Kaitlin/uL (A)  NEGATIVE Final    Extra Tube  03/08/2025 Hold for add-ons.   Final    Auto resulted.    WBC, Urine 03/08/2025 1-5  1-5, NONE /HPF Final    RBC, Urine 03/08/2025 >20 (A)  NONE, 1-2, 3-5 /HPF Final    Squamous Epithelial Cells, Urine 03/08/2025 1-9 (SPARSE)  Reference range not established. /HPF Final    Mucus, Urine 03/08/2025 FEW  Reference range not established. /LPF Final    Urine Culture 03/08/2025 Growth indicates contamination with mixed bacterial francy. Repeat culture if clinically indicated.   Final   Admission on 02/24/2025, Discharged on 02/24/2025   Component Date Value Ref Range Status    POCT Glucose 02/24/2025 146 (H)  74 - 99 mg/dL Final    Case Report 02/24/2025    Final                    Value:Surgical Pathology                                Case: [id number]                                  Authorizing Provider:  Marc Red MD           Collected:           02/24/2025 0814              Ordering Location:     [facility]       Received:            02/24/2025 [facility]                                     [facility] [place]                                                               Pathologist:           Deanna Enciso MD                                                            Specimen:    HYDROCELE SAC LEFT, recurrent hydrocelic sac                                               FINAL DIAGNOSIS 02/24/2025    Final                    Value:A. Soft tissue, left hydrocele sac, excision:   -- Predominantly fibrovascular tissue, compatible with hydrocele sac.          02/24/2025    Final                    Value:By the signature on this report, the individual or group listed as making the Final Interpretation/Diagnosis certifies that they have reviewed this case.       Comment 02/24/2025    Final                    Value:: Dr. Larissa Villalta.         Resident Review 02/24/2025    Final                    Value:The gross and/or microscopic findings were reviewed in conjunction with pathology fellow, Herb  "Chelo MONGE.        Clinical History 02/24/2025    Final                    Value:Pre-op diagnosis:  Hydrocele, unspecified hydrocele type [N43.3]        Gross Description 02/24/2025    Final                    Value:Received in formalin, labeled with the patient's name and hospital number and \"recurrent hydrocele sac\", is a segment of membranous soft tissue measuring 9.1 x 3.6 x 1.6 cm. Representative sections are submitted in one cassette.  YURIY/LAZ        Disclaimer 02/24/2025    Final                    Value:One or more of the reagents used to perform assays on this specimen MAY have contained components considered to be analyte specific reagents (ASR's).  ASR's have not been cleared or approved by the U.S. Food and Drug Administration.  These assays were developed and their performance characteristics determined by the Department of Pathology at [facility]. The FDA does not require this test to go through premarket FDA review. This test is used for clinical purposes. It should not be regarded as investigational or for research. This laboratory is certified under the Clinical Laboratory Improvement Amendments (CLIA) as qualified to perform high complexity clinical laboratory testing.  The assays were performed with appropriate positive and negative controls which stained appropriately.      POCT Glucose 02/24/2025 153 (H)  74 - 99 mg/dL Final    POCT Glucose 02/24/2025 202 (H)  74 - 99 mg/dL Final   Pre-Admission Testing on 02/14/2025   Component Date Value Ref Range Status    Glucose 02/14/2025 137 (H)  74 - 99 mg/dL Final    Sodium 02/14/2025 136  136 - 145 mmol/L Final    Potassium 02/14/2025 4.7  3.5 - 5.3 mmol/L Final    Chloride 02/14/2025 101  98 - 107 mmol/L Final    Bicarbonate 02/14/2025 25  21 - 32 mmol/L Final    Anion Gap 02/14/2025 15  10 - 20 mmol/L Final    Urea Nitrogen 02/14/2025 18  6 - 23 mg/dL Final    Creatinine 02/14/2025 0.81  0.50 - 1.30 mg/dL Final    " eGFR 02/14/2025 >90  >60 mL/min/1.73m*2 Final    Calculations of estimated GFR are performed using the 2021 CKD-EPI Study Refit equation without the race variable for the IDMS-Traceable creatinine methods.  https://jasn.asnjournals.org/content/early/2021/09/22/ASN.7122185207    Calcium 02/14/2025 9.1  8.6 - 10.6 mg/dL Final    Albumin 02/14/2025 3.6  3.4 - 5.0 g/dL Final    Alkaline Phosphatase 02/14/2025 54  33 - 136 U/L Final    Total Protein 02/14/2025 6.0 (L)  6.4 - 8.2 g/dL Final    AST 02/14/2025 19  9 - 39 U/L Final    Bilirubin, Total 02/14/2025 0.5  0.0 - 1.2 mg/dL Final    ALT 02/14/2025 21  10 - 52 U/L Final    Patients treated with Sulfasalazine may generate falsely decreased results for ALT.    Hemoglobin A1C 02/14/2025 6.9 (H)  See comment % Final    Estimated Average Glucose 02/14/2025 151  Not Established mg/dL Final    WBC 02/14/2025 7.2  4.4 - 11.3 x10*3/uL Final    nRBC 02/14/2025 0.0  0.0 - 0.0 /100 WBCs Final    RBC 02/14/2025 4.50  4.50 - 5.90 x10*6/uL Final    Hemoglobin 02/14/2025 13.5  13.5 - 17.5 g/dL Final    Hematocrit 02/14/2025 41.1  41.0 - 52.0 % Final    MCV 02/14/2025 91  80 - 100 fL Final    MCH 02/14/2025 30.0  26.0 - 34.0 pg Final    MCHC 02/14/2025 32.8  32.0 - 36.0 g/dL Final    RDW 02/14/2025 14.4  11.5 - 14.5 % Final    Platelets 02/14/2025 239  150 - 450 x10*3/uL Final    Neutrophils % 02/14/2025 72.0  40.0 - 80.0 % Final    Immature Granulocytes %, Automated 02/14/2025 0.3  0.0 - 0.9 % Final    Immature Granulocyte Count (IG) includes promyelocytes, myelocytes and metamyelocytes but does not include bands. Percent differential counts (%) should be interpreted in the context of the absolute cell counts (cells/UL).    Lymphocytes % 02/14/2025 18.2  13.0 - 44.0 % Final    Monocytes % 02/14/2025 8.5  2.0 - 10.0 % Final    Eosinophils % 02/14/2025 0.6  0.0 - 6.0 % Final    Basophils % 02/14/2025 0.4  0.0 - 2.0 % Final    Neutrophils Absolute 02/14/2025 5.15  1.60 - 5.50 x10*3/uL  Final    Percent differential counts (%) should be interpreted in the context of the absolute cell counts (cells/uL).    Immature Granulocytes Absolute, Au* 02/14/2025 0.02  0.00 - 0.50 x10*3/uL Final    Lymphocytes Absolute 02/14/2025 1.30  0.80 - 3.00 x10*3/uL Final    Monocytes Absolute 02/14/2025 0.61  0.05 - 0.80 x10*3/uL Final    Eosinophils Absolute 02/14/2025 0.04  0.00 - 0.40 x10*3/uL Final    Basophils Absolute 02/14/2025 0.03  0.00 - 0.10 x10*3/uL Final    Vitamin B12 02/14/2025 686  211 - 911 pg/mL Final    Staph/MRSA Screen Culture 02/14/2025 Isolated: Methicillin Resistant Staphylococcus aureus (MRSA) (A)   Final      Review of Systems  All other systems have been reviewed and are negative for complaint.      Assessment and Plan     Continue Bactrim BID   POV scheduled with Dr. Red 3/14/2025    All questions and concerns were addressed. Patient verbalizes understanding and has no other questions at this time.   Josselyn Morris-- MALATHI KAMINSKI  Office Phone:  [phone]

## 2025-03-11 ENCOUNTER — OFFICE VISIT (OUTPATIENT)
Dept: UROLOGY | Facility: CLINIC | Age: 80
End: 2025-03-11
Payer: COMMERCIAL

## 2025-03-11 ENCOUNTER — APPOINTMENT (OUTPATIENT)
Dept: UROLOGY | Facility: CLINIC | Age: 80
End: 2025-03-11
Payer: COMMERCIAL

## 2025-03-11 VITALS — TEMPERATURE: 96 F

## 2025-03-11 DIAGNOSIS — N50.89 SCROTAL SWELLING: ICD-10-CM

## 2025-03-11 DIAGNOSIS — N43.3 HYDROCELE, UNSPECIFIED HYDROCELE TYPE: Primary | ICD-10-CM

## 2025-03-11 PROCEDURE — 1159F MED LIST DOCD IN RCRD: CPT | Performed by: NURSE PRACTITIONER

## 2025-03-11 PROCEDURE — 1036F TOBACCO NON-USER: CPT | Performed by: NURSE PRACTITIONER

## 2025-03-11 PROCEDURE — 1160F RVW MEDS BY RX/DR IN RCRD: CPT | Performed by: NURSE PRACTITIONER

## 2025-03-11 PROCEDURE — 1126F AMNT PAIN NOTED NONE PRSNT: CPT | Performed by: NURSE PRACTITIONER

## 2025-03-11 PROCEDURE — 99024 POSTOP FOLLOW-UP VISIT: CPT | Performed by: NURSE PRACTITIONER

## 2025-03-11 ASSESSMENT — PAIN SCALES - GENERAL: PAINLEVEL_OUTOF10: 0-NO PAIN

## 2025-03-12 LAB
BACTERIA BLD CULT: NORMAL
BACTERIA BLD CULT: NORMAL

## 2025-03-13 NOTE — PROGRESS NOTES
FUV    Last visit - 1/23/25     HISTORY OF PRESENT ILLNESS:   Tomas Morataya is a 80 y.o. male who is being seen today for POV s/p left hydrocelectomy 2/24/25  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   -ED on Viagra  PAST MEDICAL HISTORY:  Past Medical History:   Diagnosis Date    Allergy to other foods     History of food allergy    BPH (benign prostatic hyperplasia)     Essential (primary) hypertension     Benign essential hypertension    Hyperlipidemia     Personal history of other endocrine, nutritional and metabolic disease     History of hyperlipidemia    Personal history of other endocrine, nutritional and metabolic disease     History of diabetes mellitus    Type 2 diabetes mellitus        PAST SURGICAL HISTORY:  Past Surgical History:   Procedure Laterality Date    OTHER SURGICAL HISTORY  08/29/2019    Cyst excision    OTHER SURGICAL HISTORY  08/29/2019    Heart surgery        ALLERGIES:   Allergies   Allergen Reactions    Cefaclor Anaphylaxis and Swelling    Cocoa Hives     Chocolate    Penicillins Swelling and Rash     FACIAL SWELLING        MEDICATIONS:   Current Outpatient Medications   Medication Instructions    acetaminophen (TYLENOL) 650 mg, Every 6 hours PRN    amLODIPine (NORVASC) 10 mg, oral, Daily    aspirin 81 mg EC tablet 1 tablet, Daily    atorvastatin (LIPITOR) 40 mg, oral, Daily    cyanocobalamin, vitamin B-12, (Vitamin B-12) 1,000 mcg tablet extended release 1 tablet, Daily    doxazosin (CARDURA) 4 mg, oral, Daily    esomeprazole (NEXIUM) 40 mg, oral, Daily before breakfast, Do not open capsule.    folic acid (Folvite) 1 mg tablet 1 tablet, Daily    hydroCHLOROthiazide (HYDRODIURIL) 25 mg, oral, Daily    ketoconazole (NIZOral) 2 % cream Apply twice daily to affected areas of face    loratadine (Claritin) 10 mg tablet 1 tablet, Daily    losartan (COZAAR) 100 mg, oral, Daily    metFORMIN (GLUCOPHAGE) 1,000 mg, oral, 2 times daily    multivitamin tablet  "Take by mouth.    omeprazole (PRILOSEC) 10 mg, oral, Daily before breakfast, Do not crush or chew.    pioglitazone (ACTOS) 15 mg, oral, Daily    spironolactone (ALDACTONE) 25 mg, oral, Daily    sulfamethoxazole-trimethoprim (Bactrim) 400-80 mg tablet 1 tablet, oral, 2 times daily    triamcinolone (Nasacort) 55 mcg nasal inhaler 1-2 sprays, Daily        PHYSICAL EXAM:  There were no vitals taken for this visit.  Constitutional: Patient appears well-developed and well-nourished. No distress.    Pulmonary/Chest: Effort normal. No respiratory distress.   Abdominal: Soft, ND NT  : fullness of the right testicle  Musculoskeletal: Normal range of motion.    Neurological: Alert and oriented to person, place, and time.  Psychiatric: Normal mood and affect. Behavior is normal. Thought content normal.      Labs:  No results found for: \"TESTOSTERONE\"  Lab Results   Component Value Date    PSA 1.73 10/04/2024     No components found for: \"CBC\"  Lab Results   Component Value Date    CREATININE 0.72 03/08/2025     No components found for: \"TESTOTMS\"  No results found for: \"TESTF\"    Imaging: Scrotal US on 5/1/24 demonstrated No sign of intrauterine mass or torsion.  Large left hydrocele with some internal debris and internal septations. Underlying infection can not be excluded.  3 mm left epididymal head cyst.    Discussion:  Patient is s/p left hydrocelectomy on 2/24/25. Healing well. Had some drainage but now improving. He is wearing a diaper for the leaking. On exam, it appears that the drainage is not coming from the incision site but instead from the back of the scrotum on the skin below the incision. I debrided some of the necrotic tissue and drained a large amount of fluid from the wound. Packed the area and applied gauze to the wound. Will come back on Monday and see Meghna to change the gauze. Will teach his wife to do the packing. Will see me back in 2 weeks.       Assessment:      No diagnosis found.      Tomas Morataya " is a 80 y.o. male here for FUV     Plan:   1)fu with NP next week and see me back in 2 weeks  All questions and concerns were addressed. Patient verbalizes understanding and has no other questions at this time.     Scribe Attestation  By signing my name below, I, Aliyah Diony Subramanian   attest that this documentation has been prepared under the direction and in the presence of Marc Red MD.

## 2025-03-14 ENCOUNTER — OFFICE VISIT (OUTPATIENT)
Dept: UROLOGY | Facility: HOSPITAL | Age: 80
End: 2025-03-14
Payer: COMMERCIAL

## 2025-03-14 DIAGNOSIS — N40.0 BENIGN PROSTATIC HYPERPLASIA WITHOUT URINARY OBSTRUCTION: ICD-10-CM

## 2025-03-14 LAB
POC APPEARANCE, URINE: CLEAR
POC BILIRUBIN, URINE: NEGATIVE
POC BLOOD, URINE: NEGATIVE
POC COLOR, URINE: YELLOW
POC GLUCOSE, URINE: NEGATIVE MG/DL
POC KETONES, URINE: NEGATIVE MG/DL
POC LEUKOCYTES, URINE: NEGATIVE
POC NITRITE,URINE: NEGATIVE
POC PH, URINE: 6 PH
POC PROTEIN, URINE: NEGATIVE MG/DL
POC SPECIFIC GRAVITY, URINE: 1.02
POC UROBILINOGEN, URINE: 0.2 EU/DL

## 2025-03-14 PROCEDURE — 81003 URINALYSIS AUTO W/O SCOPE: CPT | Performed by: UROLOGY

## 2025-03-14 PROCEDURE — 99211 OFF/OP EST MAY X REQ PHY/QHP: CPT | Performed by: UROLOGY

## 2025-03-14 PROCEDURE — 1159F MED LIST DOCD IN RCRD: CPT | Performed by: UROLOGY

## 2025-03-17 ENCOUNTER — OFFICE VISIT (OUTPATIENT)
Dept: UROLOGY | Facility: HOSPITAL | Age: 80
End: 2025-03-17
Payer: COMMERCIAL

## 2025-03-17 DIAGNOSIS — N43.3 HYDROCELE, UNSPECIFIED HYDROCELE TYPE: Primary | ICD-10-CM

## 2025-03-17 DIAGNOSIS — N49.2 CELLULITIS, SCROTUM: ICD-10-CM

## 2025-03-17 PROCEDURE — 1159F MED LIST DOCD IN RCRD: CPT | Performed by: NURSE PRACTITIONER

## 2025-03-17 PROCEDURE — 1160F RVW MEDS BY RX/DR IN RCRD: CPT | Performed by: NURSE PRACTITIONER

## 2025-03-17 PROCEDURE — G2211 COMPLEX E/M VISIT ADD ON: HCPCS | Performed by: NURSE PRACTITIONER

## 2025-03-17 PROCEDURE — 99213 OFFICE O/P EST LOW 20 MIN: CPT | Performed by: NURSE PRACTITIONER

## 2025-03-17 PROCEDURE — 99214 OFFICE O/P EST MOD 30 MIN: CPT | Performed by: NURSE PRACTITIONER

## 2025-03-17 PROCEDURE — 1036F TOBACCO NON-USER: CPT | Performed by: NURSE PRACTITIONER

## 2025-03-17 RX ORDER — SULFAMETHOXAZOLE AND TRIMETHOPRIM 400; 80 MG/1; MG/1
1 TABLET ORAL 2 TIMES DAILY
Qty: 20 TABLET | Refills: 0 | Status: SHIPPED | OUTPATIENT
Start: 2025-03-17 | End: 2025-03-27

## 2025-03-17 NOTE — PROGRESS NOTES
[facility]  Outpatient Clinic Note    Patient Name:  Tomas Morataya  MRN:  [id number]  :  1945  Date of Service: 3/17/2025     Visit type: Follow up visit    HPI    Interval History:  Tomas Morataya is a 80 y.o. male who is being seen today for  problems listed below.     Problem list/Chief complaints:  Bilateral hydrocele 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; s/p left hydrocelectomy 25  ED - on Viagra  Left epididymal head cyst      3/14/25: Visit with Dr. Red. Tomas Morataya is a 80 y.o. male who is being seen today for POV s/p left hydrocelectomy 25  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   -ED on Viagra  Healing well. Had some drainage but now improving. He is wearing a diaper for the leaking. On exam, it appears that the drainage is not coming from the incision site but instead from the back of the scrotum on the skin below the incision. I debrided some of the necrotic tissue and drained a large amount of fluid from the wound. Packed the area and applied gauze to the wound. Will come back on Monday and see Meghna to change the gauze. Will teach his wife to do the packing. Will see me back in 2 weeks.     3/17/25: Patient presents for follow up and dressing change. He is accompanied by his wife. He reports drainage from wound and is wearing pad to keep cloths dry. He is taking antibiotics and will complete the course tomorrow. He denies fever or chills.    Past Medical History:   Diagnosis Date    Allergy to other foods     History of food allergy    BPH (benign prostatic hyperplasia)     Essential (primary) hypertension     Benign essential hypertension    Hyperlipidemia     Personal history of other endocrine, nutritional and metabolic disease     History of hyperlipidemia    Personal history of other endocrine, nutritional and metabolic disease     History of diabetes mellitus    Type 2 diabetes  mellitus        Past Surgical History:   Procedure Laterality Date    OTHER SURGICAL HISTORY  08/29/2019    Cyst excision    OTHER SURGICAL HISTORY  08/29/2019    Heart surgery       Social History     Socioeconomic History    Marital status:      Spouse name: Not on file    Number of children: Not on file    Years of education: Not on file    Highest education level: Not on file   Occupational History    Not on file   Tobacco Use    Smoking status: Never    Smokeless tobacco: Never   Vaping Use    Vaping status: Never Used   Substance and Sexual Activity    Alcohol use: Yes     Alcohol/week: 1.0 standard drink of alcohol     Types: 1 Glasses of wine per week    Drug use: Never    Sexual activity: Defer   Other Topics Concern    Not on file   Social History Narrative    Not on file     Social Drivers of Health     Financial Resource Strain: Not on file   Food Insecurity: Not on file   Transportation Needs: Not on file   Physical Activity: Not on file   Stress: Not on file   Social Connections: Not on file   Intimate Partner Violence: Not on file   Housing Stability: Not on file       Allergies   Allergen Reactions    Cefaclor Anaphylaxis and Swelling    Cocoa Hives     Chocolate    Penicillins Swelling and Rash     FACIAL SWELLING          Current Outpatient Medications:     acetaminophen (Tylenol) 500 mg tablet, Take 650 mg by mouth every 6 hours if needed for mild pain (1 - 3)., Disp: , Rfl:     amLODIPine (Norvasc) 10 mg tablet, Take 1 tablet (10 mg) by mouth once daily., Disp: 90 tablet, Rfl: 1    aspirin 81 mg EC tablet, Take 1 tablet (81 mg) by mouth once daily., Disp: , Rfl:     atorvastatin (Lipitor) 40 mg tablet, Take 1 tablet (40 mg) by mouth once daily., Disp: 90 tablet, Rfl: 1    cyanocobalamin, vitamin B-12, (Vitamin B-12) 1,000 mcg tablet extended release, Take 1 tablet (1,000 mcg) by mouth once daily., Disp: , Rfl:     doxazosin (Cardura) 4 mg tablet, Take 1 tablet (4 mg) by mouth once daily.,  Disp: 90 tablet, Rfl: 1    folic acid (Folvite) 1 mg tablet, Take 1 tablet (1 mg) by mouth once daily., Disp: , Rfl:     hydroCHLOROthiazide (HYDRODiuril) 25 mg tablet, Take 1 tablet (25 mg) by mouth once daily., Disp: 90 tablet, Rfl: 1    ketoconazole (NIZOral) 2 % cream, Apply twice daily to affected areas of face, Disp: 60 g, Rfl: 11    loratadine (Claritin) 10 mg tablet, Take 1 tablet (10 mg) by mouth once daily., Disp: , Rfl:     losartan (Cozaar) 100 mg tablet, Take 1 tablet (100 mg) by mouth once daily., Disp: 90 tablet, Rfl: 1    metFORMIN (Glucophage) 1,000 mg tablet, Take 1 tablet (1,000 mg) by mouth 2 times a day., Disp: 180 tablet, Rfl: 1    multivitamin tablet, Take by mouth., Disp: , Rfl:     pioglitazone (Actos) 15 mg tablet, Take 1 tablet (15 mg) by mouth once daily., Disp: 90 tablet, Rfl: 1    spironolactone (Aldactone) 25 mg tablet, Take 1 tablet (25 mg) by mouth once daily., Disp: 90 tablet, Rfl: 1    sulfamethoxazole-trimethoprim (Bactrim) 400-80 mg tablet, Take 1 tablet by mouth 2 times a day for 10 days., Disp: 20 tablet, Rfl: 0    triamcinolone (Nasacort) 55 mcg nasal inhaler, Administer 1-2 sprays into each nostril once daily., Disp: , Rfl:      Review of system:  All other systems have been reviewed and are negative for complaints      Last recorded vitals:  There were no vitals taken for this visit.    Physical Exam:  General: Appears comfortable and in no apparent distress.  Head: Normocephalic, atraumatic  Eyes: Non-injected conjunctiva, sclera clear, no proptosis  Lungs: Breathing is easy, non-labored. Speaking in clear and complete sentences. Normal diaphragmatic movement.  Cardiovascular: no peripheral edema, cyanosis or pallor.   Abdomen: soft, non-distended, non-tender  : Bladder: non tender, not distended; scrotal wound with yellow drainage and foul smell  MSK: Ambulatory with steady gait, unassisted  Skin: No visible rashes or lesions  Neurologic: Alert, oriented to person,  place, and time  Psychiatric: mood and affect appropriate      Imaging  === 05/01/24 ===    US SCROTUM WITH DOPPLERS    - Impression -  No sign of intrauterine mass or torsion.    Large left hydrocele with some internal debris and internal  septations. Underlying infection can not be excluded.    3 mm left epididymal head cyst.      MACRO:  None    Signed by: Toño Eller 5/2/2024 6:19 PM  Dictation workstation:   [id number]    Labs  Office Visit on 03/14/2025   Component Date Value    POC Color, Urine 03/14/2025 Yellow     POC Appearance, Urine 03/14/2025 Clear     POC Glucose, Urine 03/14/2025 NEGATIVE     POC Bilirubin, Urine 03/14/2025 NEGATIVE     POC Ketones, Urine 03/14/2025 NEGATIVE     POC Specific Gravity, Ur* 03/14/2025 1.020     POC Blood, Urine 03/14/2025 NEGATIVE     POC PH, Urine 03/14/2025 6.0     POC Protein, Urine 03/14/2025 NEGATIVE     POC Urobilinogen, Urine 03/14/2025 0.2     Poc Nitrite, Urine 03/14/2025 NEGATIVE     POC Leukocytes, Urine 03/14/2025 NEGATIVE        Assessment and Plan:  Tomas Morataya is a 80 y.o. male with history of left epididymal head cyst, ED, bilateral hydrocele  s/p bilateral hydrocelectomy and a bilateral spermatic cord block on 3/14/23; left hydrocele s/p left hydrocelectomy on 2/24/24, who presents for follow up and wound care.    Plan:  -Scrotal wound packed with dry gauze bandage. Patient tolerated dressing change well  -Discussed with Dr. Red, will extend patient's antibiotics course.  -Prescription for Bactrim 400-80 mg BID x 10 days sent to pharmacy  -Patient's wife is unable to do dressing changes at home  -Follow-up in 2 days for dressing change, or sooner if needed, to reassess symptoms and for medication refill.    All questions and concerns were addressed. Patient verbalizes understanding and has no other questions at this time.     Some elements copied from Dr. Red's note on 3/14/25, the elements have been updated and all reflect current  decision making from today, 03/17/25    E&M visit today is associated with current or anticipated ongoing medical care services related to a patient's single, serious condition or a complex condition.    YAMILEX Lao-CNP   [facility]  03/17/25 2:43 PM

## 2025-03-19 ENCOUNTER — OFFICE VISIT (OUTPATIENT)
Dept: UROLOGY | Facility: HOSPITAL | Age: 80
End: 2025-03-19
Payer: COMMERCIAL

## 2025-03-19 DIAGNOSIS — S31.30XS: ICD-10-CM

## 2025-03-19 DIAGNOSIS — Z98.890 HISTORY OF HYDROCELECTOMY: Primary | ICD-10-CM

## 2025-03-19 PROBLEM — S31.30XA OPEN WND SCROTUM/TESTES: Status: ACTIVE | Noted: 2025-03-19

## 2025-03-19 PROCEDURE — 1159F MED LIST DOCD IN RCRD: CPT | Performed by: NURSE PRACTITIONER

## 2025-03-19 PROCEDURE — 1160F RVW MEDS BY RX/DR IN RCRD: CPT | Performed by: NURSE PRACTITIONER

## 2025-03-19 PROCEDURE — 99214 OFFICE O/P EST MOD 30 MIN: CPT | Performed by: NURSE PRACTITIONER

## 2025-03-19 PROCEDURE — 1036F TOBACCO NON-USER: CPT | Performed by: NURSE PRACTITIONER

## 2025-03-19 PROCEDURE — G2211 COMPLEX E/M VISIT ADD ON: HCPCS | Performed by: NURSE PRACTITIONER

## 2025-03-19 NOTE — PROGRESS NOTES
[facility]  Outpatient Clinic Note    Patient Name:  Tomas Morataya  MRN:  [id number]  :  1945  Date of Service: 3/19/2025     Visit type: Follow up visit    HPI    Interval History:  Tomas Morataya is a 80 y.o. male who is being seen today for  problems listed below.     Problem list/Chief complaints:  Bilateral hydrocele 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; s/p left hydrocelectomy 25  ED - on Viagra  Left epididymal head cyst      3/14/25: Visit with Dr. Red. Tomas Morataya is a 80 y.o. male who is being seen today for POV s/p left hydrocelectomy 25  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   -ED on Viagra  Healing well. Had some drainage but now improving. He is wearing a diaper for the leaking. On exam, it appears that the drainage is not coming from the incision site but instead from the back of the scrotum on the skin below the incision. I debrided some of the necrotic tissue and drained a large amount of fluid from the wound. Packed the area and applied gauze to the wound. Will come back on Monday and see Meghna to change the gauze. Will teach his wife to do the packing. Will see me back in 2 weeks.     3/17/25: Patient presents for follow up and dressing change. He is accompanied by his wife. He reports drainage from wound and is wearing pad to keep cloths dry. He is taking antibiotics and will complete the course tomorrow. He denies fever or chills.    3/19/25: Patient presents for follow up and scrotal wound packing. He is taking antibiotics as prescribed. He denies any urinary issues, no fever or chills.    Past Medical History:   Diagnosis Date    Allergy to other foods     History of food allergy    BPH (benign prostatic hyperplasia)     Essential (primary) hypertension     Benign essential hypertension    Hyperlipidemia     Personal history of other endocrine, nutritional and metabolic disease      History of hyperlipidemia    Personal history of other endocrine, nutritional and metabolic disease     History of diabetes mellitus    Type 2 diabetes mellitus        Past Surgical History:   Procedure Laterality Date    OTHER SURGICAL HISTORY  08/29/2019    Cyst excision    OTHER SURGICAL HISTORY  08/29/2019    Heart surgery       Social History     Socioeconomic History    Marital status:      Spouse name: Not on file    Number of children: Not on file    Years of education: Not on file    Highest education level: Not on file   Occupational History    Not on file   Tobacco Use    Smoking status: Never    Smokeless tobacco: Never   Vaping Use    Vaping status: Never Used   Substance and Sexual Activity    Alcohol use: Yes     Alcohol/week: 1.0 standard drink of alcohol     Types: 1 Glasses of wine per week    Drug use: Never    Sexual activity: Defer   Other Topics Concern    Not on file   Social History Narrative    Not on file     Social Drivers of Health     Financial Resource Strain: Not on file   Food Insecurity: Not on file   Transportation Needs: Not on file   Physical Activity: Not on file   Stress: Not on file   Social Connections: Not on file   Intimate Partner Violence: Not on file   Housing Stability: Not on file       Allergies   Allergen Reactions    Cefaclor Anaphylaxis and Swelling    Cocoa Hives     Chocolate    Penicillins Swelling and Rash     FACIAL SWELLING          Current Outpatient Medications:     acetaminophen (Tylenol) 500 mg tablet, Take 650 mg by mouth every 6 hours if needed for mild pain (1 - 3)., Disp: , Rfl:     amLODIPine (Norvasc) 10 mg tablet, Take 1 tablet (10 mg) by mouth once daily., Disp: 90 tablet, Rfl: 1    aspirin 81 mg EC tablet, Take 1 tablet (81 mg) by mouth once daily., Disp: , Rfl:     atorvastatin (Lipitor) 40 mg tablet, Take 1 tablet (40 mg) by mouth once daily., Disp: 90 tablet, Rfl: 1    cyanocobalamin, vitamin B-12, (Vitamin B-12) 1,000 mcg tablet  extended release, Take 1 tablet (1,000 mcg) by mouth once daily., Disp: , Rfl:     doxazosin (Cardura) 4 mg tablet, Take 1 tablet (4 mg) by mouth once daily., Disp: 90 tablet, Rfl: 1    folic acid (Folvite) 1 mg tablet, Take 1 tablet (1 mg) by mouth once daily., Disp: , Rfl:     hydroCHLOROthiazide (HYDRODiuril) 25 mg tablet, Take 1 tablet (25 mg) by mouth once daily., Disp: 90 tablet, Rfl: 1    ketoconazole (NIZOral) 2 % cream, Apply twice daily to affected areas of face, Disp: 60 g, Rfl: 11    loratadine (Claritin) 10 mg tablet, Take 1 tablet (10 mg) by mouth once daily., Disp: , Rfl:     losartan (Cozaar) 100 mg tablet, Take 1 tablet (100 mg) by mouth once daily., Disp: 90 tablet, Rfl: 1    metFORMIN (Glucophage) 1,000 mg tablet, Take 1 tablet (1,000 mg) by mouth 2 times a day., Disp: 180 tablet, Rfl: 1    multivitamin tablet, Take by mouth., Disp: , Rfl:     pioglitazone (Actos) 15 mg tablet, Take 1 tablet (15 mg) by mouth once daily., Disp: 90 tablet, Rfl: 1    spironolactone (Aldactone) 25 mg tablet, Take 1 tablet (25 mg) by mouth once daily., Disp: 90 tablet, Rfl: 1    sulfamethoxazole-trimethoprim (Bactrim) 400-80 mg tablet, Take 1 tablet by mouth 2 times a day for 10 days., Disp: 20 tablet, Rfl: 0    triamcinolone (Nasacort) 55 mcg nasal inhaler, Administer 1-2 sprays into each nostril once daily., Disp: , Rfl:      Review of system:  All other systems have been reviewed and are negative for complaints      Last recorded vitals:  There were no vitals taken for this visit.    Physical Exam:  General: Appears comfortable and in no apparent distress.  Head: Normocephalic, atraumatic  Eyes: Non-injected conjunctiva, sclera clear, no proptosis  Lungs: Breathing is easy, non-labored. Speaking in clear and complete sentences. Normal diaphragmatic movement.  Cardiovascular: no peripheral edema, cyanosis or pallor.   Abdomen: soft, non-distended, non-tender  : Bladder: non tender, not distended; scrotal wound with  pink heathy tissue, yellow drainage and foul smell noted.  MSK: Ambulatory with steady gait, unassisted  Skin: No visible rashes or lesions  Neurologic: Alert, oriented to person, place, and time  Psychiatric: mood and affect appropriate      Imaging  === 05/01/24 ===    US SCROTUM WITH DOPPLERS    - Impression -  No sign of intrauterine mass or torsion.    Large left hydrocele with some internal debris and internal  septations. Underlying infection can not be excluded.    3 mm left epididymal head cyst.      MACRO:  None    Signed by: Toño Eller 5/2/2024 6:19 PM  Dictation workstation:   [id number]    Labs  Office Visit on 03/14/2025   Component Date Value    POC Color, Urine 03/14/2025 Yellow     POC Appearance, Urine 03/14/2025 Clear     POC Glucose, Urine 03/14/2025 NEGATIVE     POC Bilirubin, Urine 03/14/2025 NEGATIVE     POC Ketones, Urine 03/14/2025 NEGATIVE     POC Specific Gravity, Ur* 03/14/2025 1.020     POC Blood, Urine 03/14/2025 NEGATIVE     POC PH, Urine 03/14/2025 6.0     POC Protein, Urine 03/14/2025 NEGATIVE     POC Urobilinogen, Urine 03/14/2025 0.2     Poc Nitrite, Urine 03/14/2025 NEGATIVE     POC Leukocytes, Urine 03/14/2025 NEGATIVE        Assessment and Plan:  Tomas Morataya is a 80 y.o. male with history of left epididymal head cyst, ED, bilateral hydrocele  s/p bilateral hydrocelectomy and a bilateral spermatic cord block on 3/14/23; left hydrocele s/p left hydrocelectomy on 2/24/24, who presents for follow up and wound care.    Plan:  -Scrotal wound packed with dry gauze bandage. Patient tolerated dressing change well  -Continue antibiotics as prescribed  -Follow-up in 2 days for dressing change, or sooner if needed, to reassess symptoms and for medication refill.    All questions and concerns were addressed. Patient verbalizes understanding and has no other questions at this time.     Some elements copied from Dr. Red's note on 3/14/25, the elements have been updated and all  reflect current decision making from today, 03/19/25    E&M visit today is associated with current or anticipated ongoing medical care services related to a patient's single, serious condition or a complex condition.    YAMILEX Lao-CNP   [facility]  03/19/25 3:26 PM

## 2025-03-21 ENCOUNTER — OFFICE VISIT (OUTPATIENT)
Dept: UROLOGY | Facility: HOSPITAL | Age: 80
End: 2025-03-21
Payer: COMMERCIAL

## 2025-03-21 DIAGNOSIS — S31.30XS: Primary | ICD-10-CM

## 2025-03-21 DIAGNOSIS — Z98.890 HISTORY OF HYDROCELECTOMY: ICD-10-CM

## 2025-03-21 PROCEDURE — 99213 OFFICE O/P EST LOW 20 MIN: CPT | Performed by: NURSE PRACTITIONER

## 2025-03-21 NOTE — PROGRESS NOTES
[facility]  Outpatient Clinic Note    Patient Name:  Tomas Morataya  MRN:  [id number]  :  1945  Date of Service: 3/21/2025     Visit type: Follow up visit    HPI    Interval History:  Tomas Morataya is a 80 y.o. male who is being seen today for  problems listed below.     Problem list/Chief complaints:  Bilateral hydrocele 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; s/p left hydrocelectomy 25  ED - on Viagra  Left epididymal head cyst      3/14/25: Visit with Dr. Red. Tomas Morataya is a 80 y.o. male who is being seen today for POV s/p left hydrocelectomy 25  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   -ED on Viagra  Healing well. Had some drainage but now improving. He is wearing a diaper for the leaking. On exam, it appears that the drainage is not coming from the incision site but instead from the back of the scrotum on the skin below the incision. I debrided some of the necrotic tissue and drained a large amount of fluid from the wound. Packed the area and applied gauze to the wound. Will come back on Monday and see Meghna to change the gauze. Will teach his wife to do the packing. Will see me back in 2 weeks.     3/17/25: Patient presents for follow up and dressing change. He is accompanied by his wife. He reports drainage from wound and is wearing pad to keep cloths dry. He is taking antibiotics and will complete the course tomorrow. He denies fever or chills.    3/19/25: Patient presents for follow up and scrotal wound packing. He is taking antibiotics as prescribed. He denies any urinary issues, no fever or chills.    3/21/25: Patient presents for follow up and wound packing. He noticed small amount of blood from his wound yesterday but it has since cleared up. He denies having any fever or chills.    Past Medical History:   Diagnosis Date    Allergy to other foods     History of food allergy    BPH (benign  prostatic hyperplasia)     Essential (primary) hypertension     Benign essential hypertension    Hyperlipidemia     Personal history of other endocrine, nutritional and metabolic disease     History of hyperlipidemia    Personal history of other endocrine, nutritional and metabolic disease     History of diabetes mellitus    Type 2 diabetes mellitus        Past Surgical History:   Procedure Laterality Date    OTHER SURGICAL HISTORY  08/29/2019    Cyst excision    OTHER SURGICAL HISTORY  08/29/2019    Heart surgery       Social History     Socioeconomic History    Marital status:      Spouse name: Not on file    Number of children: Not on file    Years of education: Not on file    Highest education level: Not on file   Occupational History    Not on file   Tobacco Use    Smoking status: Never    Smokeless tobacco: Never   Vaping Use    Vaping status: Never Used   Substance and Sexual Activity    Alcohol use: Yes     Alcohol/week: 1.0 standard drink of alcohol     Types: 1 Glasses of wine per week    Drug use: Never    Sexual activity: Defer   Other Topics Concern    Not on file   Social History Narrative    Not on file     Social Drivers of Health     Financial Resource Strain: Not on file   Food Insecurity: Not on file   Transportation Needs: Not on file   Physical Activity: Not on file   Stress: Not on file   Social Connections: Not on file   Intimate Partner Violence: Not on file   Housing Stability: Not on file       Allergies   Allergen Reactions    Cefaclor Anaphylaxis and Swelling    Cocoa Hives     Chocolate    Penicillins Swelling and Rash     FACIAL SWELLING          Current Outpatient Medications:     acetaminophen (Tylenol) 500 mg tablet, Take 650 mg by mouth every 6 hours if needed for mild pain (1 - 3)., Disp: , Rfl:     amLODIPine (Norvasc) 10 mg tablet, Take 1 tablet (10 mg) by mouth once daily., Disp: 90 tablet, Rfl: 1    aspirin 81 mg EC tablet, Take 1 tablet (81 mg) by mouth once daily.,  Disp: , Rfl:     atorvastatin (Lipitor) 40 mg tablet, Take 1 tablet (40 mg) by mouth once daily., Disp: 90 tablet, Rfl: 1    cyanocobalamin, vitamin B-12, (Vitamin B-12) 1,000 mcg tablet extended release, Take 1 tablet (1,000 mcg) by mouth once daily., Disp: , Rfl:     doxazosin (Cardura) 4 mg tablet, Take 1 tablet (4 mg) by mouth once daily., Disp: 90 tablet, Rfl: 1    folic acid (Folvite) 1 mg tablet, Take 1 tablet (1 mg) by mouth once daily., Disp: , Rfl:     hydroCHLOROthiazide (HYDRODiuril) 25 mg tablet, Take 1 tablet (25 mg) by mouth once daily., Disp: 90 tablet, Rfl: 1    ketoconazole (NIZOral) 2 % cream, Apply twice daily to affected areas of face, Disp: 60 g, Rfl: 11    loratadine (Claritin) 10 mg tablet, Take 1 tablet (10 mg) by mouth once daily., Disp: , Rfl:     losartan (Cozaar) 100 mg tablet, Take 1 tablet (100 mg) by mouth once daily., Disp: 90 tablet, Rfl: 1    metFORMIN (Glucophage) 1,000 mg tablet, Take 1 tablet (1,000 mg) by mouth 2 times a day., Disp: 180 tablet, Rfl: 1    multivitamin tablet, Take by mouth., Disp: , Rfl:     pioglitazone (Actos) 15 mg tablet, Take 1 tablet (15 mg) by mouth once daily., Disp: 90 tablet, Rfl: 1    spironolactone (Aldactone) 25 mg tablet, Take 1 tablet (25 mg) by mouth once daily., Disp: 90 tablet, Rfl: 1    sulfamethoxazole-trimethoprim (Bactrim) 400-80 mg tablet, Take 1 tablet by mouth 2 times a day for 10 days., Disp: 20 tablet, Rfl: 0    triamcinolone (Nasacort) 55 mcg nasal inhaler, Administer 1-2 sprays into each nostril once daily., Disp: , Rfl:      Review of system:  All other systems have been reviewed and are negative for complaints      Last recorded vitals:  There were no vitals taken for this visit.    Physical Exam:  General: Appears comfortable and in no apparent distress.  Head: Normocephalic, atraumatic  Eyes: Non-injected conjunctiva, sclera clear, no proptosis  Lungs: Breathing is easy, non-labored. Speaking in clear and complete sentences.  Normal diaphragmatic movement.  Cardiovascular: no peripheral edema, cyanosis or pallor.   Abdomen: soft, non-distended, non-tender  : Bladder: non tender, not distended; scrotal wound with pink heathy tissue, yellow drainage and foul smell noted.  MSK: Ambulatory with steady gait, unassisted  Skin: No visible rashes or lesions  Neurologic: Alert, oriented to person, place, and time  Psychiatric: mood and affect appropriate      Imaging  === 05/01/24 ===    US SCROTUM WITH DOPPLERS    - Impression -  No sign of intrauterine mass or torsion.    Large left hydrocele with some internal debris and internal  septations. Underlying infection can not be excluded.    3 mm left epididymal head cyst.      MACRO:  None    Signed by: Toño Eller 5/2/2024 6:19 PM  Dictation workstation:   [id number]    Labs  Office Visit on 03/14/2025   Component Date Value    POC Color, Urine 03/14/2025 Yellow     POC Appearance, Urine 03/14/2025 Clear     POC Glucose, Urine 03/14/2025 NEGATIVE     POC Bilirubin, Urine 03/14/2025 NEGATIVE     POC Ketones, Urine 03/14/2025 NEGATIVE     POC Specific Gravity, Ur* 03/14/2025 1.020     POC Blood, Urine 03/14/2025 NEGATIVE     POC PH, Urine 03/14/2025 6.0     POC Protein, Urine 03/14/2025 NEGATIVE     POC Urobilinogen, Urine 03/14/2025 0.2     Poc Nitrite, Urine 03/14/2025 NEGATIVE     POC Leukocytes, Urine 03/14/2025 NEGATIVE        Assessment and Plan:  Tomas Morataya is a 80 y.o. male with history of left epididymal head cyst, ED, bilateral hydrocele  s/p bilateral hydrocelectomy and a bilateral spermatic cord block on 3/14/23; left hydrocele s/p left hydrocelectomy on 2/24/24, who presents for follow up and wound care.    Plan:  -Scrotal wound packed with dry gauze bandage. Patient tolerated dressing change well  -Continue antibiotics as prescribed  -Follow-up in 2 days for dressing change, or sooner if needed, to reassess symptoms and for medication refill.    All questions and  concerns were addressed. Patient verbalizes understanding and has no other questions at this time.     Some elements copied from Dr. Red's note on 3/14/25, the elements have been updated and all reflect current decision making from today, 03/21/25    E&M visit today is associated with current or anticipated ongoing medical care services related to a patient's single, serious condition or a complex condition.    YAMILEX Lao-CNP   [facility]  03/21/25 4:46 PM

## 2025-03-23 NOTE — PROGRESS NOTES
[facility]  Outpatient Clinic Note    Patient Name:  Tomas Morataya  MRN:  [id number]  :  1945  Date of Service: 3/24/2025     Visit type: Follow up visit    HPI    Interval History:  Tomas Morataya is a 80 y.o. male who is being seen today for  problems listed below.     Problem list/Chief complaints:  Bilateral hydrocele 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; s/p left hydrocelectomy 25  ED - on Viagra  Left epididymal head cyst      3/14/25: Visit with Dr. Red. Tomas Morataya is a 80 y.o. male who is being seen today for POV s/p left hydrocelectomy 25  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   -ED on Viagra  Healing well. Had some drainage but now improving. He is wearing a diaper for the leaking. On exam, it appears that the drainage is not coming from the incision site but instead from the back of the scrotum on the skin below the incision. I debrided some of the necrotic tissue and drained a large amount of fluid from the wound. Packed the area and applied gauze to the wound. Will come back on Monday and see Meghna to change the gauze. Will teach his wife to do the packing. Will see me back in 2 weeks.     3/17/25: Patient presents for follow up and dressing change. He is accompanied by his wife. He reports drainage from wound and is wearing pad to keep cloths dry. He is taking antibiotics and will complete the course tomorrow. He denies fever or chills.    3/19/25: Patient presents for follow up and scrotal wound packing. He is taking antibiotics as prescribed. He denies any urinary issues, no fever or chills.    3/21/25: Patient presents for follow up and wound packing. He noticed small amount of blood from his wound yesterday but it has since cleared up. He denies having any fever or chills.    3/24/25: Patient presents for follow up and wound packing. He reports some bleeding from wound site likely  due to sitting on hard surface, but bleeding has since resolved. He denies any other issues, no fever or chills.    Past Medical History:   Diagnosis Date    Allergy to other foods     History of food allergy    BPH (benign prostatic hyperplasia)     Essential (primary) hypertension     Benign essential hypertension    Hyperlipidemia     Personal history of other endocrine, nutritional and metabolic disease     History of hyperlipidemia    Personal history of other endocrine, nutritional and metabolic disease     History of diabetes mellitus    Type 2 diabetes mellitus        Past Surgical History:   Procedure Laterality Date    OTHER SURGICAL HISTORY  08/29/2019    Cyst excision    OTHER SURGICAL HISTORY  08/29/2019    Heart surgery       Social History     Socioeconomic History    Marital status:      Spouse name: Not on file    Number of children: Not on file    Years of education: Not on file    Highest education level: Not on file   Occupational History    Not on file   Tobacco Use    Smoking status: Never    Smokeless tobacco: Never   Vaping Use    Vaping status: Never Used   Substance and Sexual Activity    Alcohol use: Yes     Alcohol/week: 1.0 standard drink of alcohol     Types: 1 Glasses of wine per week    Drug use: Never    Sexual activity: Defer   Other Topics Concern    Not on file   Social History Narrative    Not on file     Social Drivers of Health     Financial Resource Strain: Not on file   Food Insecurity: Not on file   Transportation Needs: Not on file   Physical Activity: Not on file   Stress: Not on file   Social Connections: Not on file   Intimate Partner Violence: Not on file   Housing Stability: Not on file       Allergies   Allergen Reactions    Cefaclor Anaphylaxis and Swelling    Cocoa Hives     Chocolate    Penicillins Swelling and Rash     FACIAL SWELLING          Current Outpatient Medications:     acetaminophen (Tylenol) 500 mg tablet, Take 650 mg by mouth every 6 hours if  needed for mild pain (1 - 3)., Disp: , Rfl:     amLODIPine (Norvasc) 10 mg tablet, Take 1 tablet (10 mg) by mouth once daily., Disp: 90 tablet, Rfl: 1    aspirin 81 mg EC tablet, Take 1 tablet (81 mg) by mouth once daily., Disp: , Rfl:     atorvastatin (Lipitor) 40 mg tablet, Take 1 tablet (40 mg) by mouth once daily., Disp: 90 tablet, Rfl: 1    cyanocobalamin, vitamin B-12, (Vitamin B-12) 1,000 mcg tablet extended release, Take 1 tablet (1,000 mcg) by mouth once daily., Disp: , Rfl:     doxazosin (Cardura) 4 mg tablet, Take 1 tablet (4 mg) by mouth once daily., Disp: 90 tablet, Rfl: 1    folic acid (Folvite) 1 mg tablet, Take 1 tablet (1 mg) by mouth once daily., Disp: , Rfl:     hydroCHLOROthiazide (HYDRODiuril) 25 mg tablet, Take 1 tablet (25 mg) by mouth once daily., Disp: 90 tablet, Rfl: 1    ketoconazole (NIZOral) 2 % cream, Apply twice daily to affected areas of face, Disp: 60 g, Rfl: 11    loratadine (Claritin) 10 mg tablet, Take 1 tablet (10 mg) by mouth once daily., Disp: , Rfl:     losartan (Cozaar) 100 mg tablet, Take 1 tablet (100 mg) by mouth once daily., Disp: 90 tablet, Rfl: 1    metFORMIN (Glucophage) 1,000 mg tablet, Take 1 tablet (1,000 mg) by mouth 2 times a day., Disp: 180 tablet, Rfl: 1    multivitamin tablet, Take by mouth., Disp: , Rfl:     pioglitazone (Actos) 15 mg tablet, Take 1 tablet (15 mg) by mouth once daily., Disp: 90 tablet, Rfl: 1    spironolactone (Aldactone) 25 mg tablet, Take 1 tablet (25 mg) by mouth once daily., Disp: 90 tablet, Rfl: 1    sulfamethoxazole-trimethoprim (Bactrim) 400-80 mg tablet, Take 1 tablet by mouth 2 times a day for 10 days., Disp: 20 tablet, Rfl: 0    triamcinolone (Nasacort) 55 mcg nasal inhaler, Administer 1-2 sprays into each nostril once daily., Disp: , Rfl:      Review of system:  All other systems have been reviewed and are negative for complaints      Last recorded vitals:  There were no vitals taken for this visit.    Physical Exam:  General:  Appears comfortable and in no apparent distress.  Head: Normocephalic, atraumatic  Eyes: Non-injected conjunctiva, sclera clear, no proptosis  Lungs: Breathing is easy, non-labored. Speaking in clear and complete sentences. Normal diaphragmatic movement.  Cardiovascular: no peripheral edema, cyanosis or pallor.   Abdomen: soft, non-distended, non-tender  : Bladder: non tender, not distended; scrotal wound with pink heathy tissue, yellow drainage and foul smell noted.  MSK: Ambulatory with cane  Skin: No visible rashes or lesions  Neurologic: Alert, oriented to person, place, and time  Psychiatric: mood and affect appropriate      Imaging  === 05/01/24 ===    US SCROTUM WITH DOPPLERS    - Impression -  No sign of intrauterine mass or torsion.    Large left hydrocele with some internal debris and internal  septations. Underlying infection can not be excluded.    3 mm left epididymal head cyst.      MACRO:  None    Signed by: Toño Eller 5/2/2024 6:19 PM  Dictation workstation:   [id number]    Labs  Office Visit on 03/14/2025   Component Date Value    POC Color, Urine 03/14/2025 Yellow     POC Appearance, Urine 03/14/2025 Clear     POC Glucose, Urine 03/14/2025 NEGATIVE     POC Bilirubin, Urine 03/14/2025 NEGATIVE     POC Ketones, Urine 03/14/2025 NEGATIVE     POC Specific Gravity, Ur* 03/14/2025 1.020     POC Blood, Urine 03/14/2025 NEGATIVE     POC PH, Urine 03/14/2025 6.0     POC Protein, Urine 03/14/2025 NEGATIVE     POC Urobilinogen, Urine 03/14/2025 0.2     Poc Nitrite, Urine 03/14/2025 NEGATIVE     POC Leukocytes, Urine 03/14/2025 NEGATIVE        Assessment and Plan:  Tomas Morataya is a 80 y.o. male with history of left epididymal head cyst, ED, bilateral hydrocele  s/p bilateral hydrocelectomy and a bilateral spermatic cord block on 3/14/23; left hydrocele s/p left hydrocelectomy on 2/24/24, who presents for follow up and wound care.    Plan:  -Scrotal wound packed with dry gauze bandage. Patient  tolerated dressing change well  -Continue antibiotics as prescribed  -Follow-up in 2 days for dressing change, or sooner if needed, to reassess symptoms and for medication refill.    All questions and concerns were addressed. Patient verbalizes understanding and has no other questions at this time.     Some elements copied from Dr. Red's note on 3/14/25, the elements have been updated and all reflect current decision making from today, 03/24/25    E&M visit today is associated with current or anticipated ongoing medical care services related to a patient's single, serious condition or a complex condition.    YAMILEX Lao-CNP   [facility]  03/24/25 12:55 PM

## 2025-03-24 ENCOUNTER — OFFICE VISIT (OUTPATIENT)
Dept: UROLOGY | Facility: HOSPITAL | Age: 80
End: 2025-03-24
Payer: COMMERCIAL

## 2025-03-24 DIAGNOSIS — S31.30XS: Primary | ICD-10-CM

## 2025-03-24 DIAGNOSIS — Z98.890 HISTORY OF HYDROCELECTOMY: ICD-10-CM

## 2025-03-24 PROCEDURE — 1160F RVW MEDS BY RX/DR IN RCRD: CPT | Performed by: NURSE PRACTITIONER

## 2025-03-24 PROCEDURE — 1159F MED LIST DOCD IN RCRD: CPT | Performed by: NURSE PRACTITIONER

## 2025-03-24 PROCEDURE — 1036F TOBACCO NON-USER: CPT | Performed by: NURSE PRACTITIONER

## 2025-03-24 PROCEDURE — 99213 OFFICE O/P EST LOW 20 MIN: CPT | Performed by: NURSE PRACTITIONER

## 2025-03-24 PROCEDURE — G2211 COMPLEX E/M VISIT ADD ON: HCPCS | Performed by: NURSE PRACTITIONER

## 2025-03-25 NOTE — PROGRESS NOTES
[facility]  Outpatient Clinic Note    Patient Name:  Tomas Morataya  MRN:  [id number]  :  1945  Date of Service: 3/26/2025     Visit type: Follow up visit    HPI    Interval History:  Tomas Morataya is a 80 y.o. male who is being seen today for  problems listed below.     Problem list/Chief complaints:  Bilateral hydrocele 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; s/p left hydrocelectomy 25  ED - on Viagra  Left epididymal head cyst      3/14/25: Visit with Dr. Red. Tomas Morataya is a 80 y.o. male who is being seen today for POV s/p left hydrocelectomy 25  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   -ED on Viagra  Healing well. Had some drainage but now improving. He is wearing a diaper for the leaking. On exam, it appears that the drainage is not coming from the incision site but instead from the back of the scrotum on the skin below the incision. I debrided some of the necrotic tissue and drained a large amount of fluid from the wound. Packed the area and applied gauze to the wound. Will come back on Monday and see Meghna to change the gauze. Will teach his wife to do the packing. Will see me back in 2 weeks.     3/17/25: Patient presents for follow up and dressing change. He is accompanied by his wife. He reports drainage from wound and is wearing pad to keep cloths dry. He is taking antibiotics and will complete the course tomorrow. He denies fever or chills.    3/19/25: Patient presents for follow up and scrotal wound packing. He is taking antibiotics as prescribed. He denies any urinary issues, no fever or chills.    3/21/25: Patient presents for follow up and wound packing. He noticed small amount of blood from his wound yesterday but it has since cleared up. He denies having any fever or chills.    3/24/25: Patient presents for follow up and wound packing. He reports some bleeding from wound site likely  due to sitting on hard surface, but bleeding has since resolved. He denies any other issues, no fever or chills.    3/26/25: Patient presents for follow up and wound care. He reports minimal bleeding from wound, no pain, no burning sensation, no fever or chills. Wound packing not present and might have fallen off earlier. Dr. House joined visit to assess patient's wound and states it's healing well.     Past Medical History:   Diagnosis Date    Allergy to other foods     History of food allergy    BPH (benign prostatic hyperplasia)     Essential (primary) hypertension     Benign essential hypertension    Hyperlipidemia     Personal history of other endocrine, nutritional and metabolic disease     History of hyperlipidemia    Personal history of other endocrine, nutritional and metabolic disease     History of diabetes mellitus    Type 2 diabetes mellitus        Past Surgical History:   Procedure Laterality Date    OTHER SURGICAL HISTORY  08/29/2019    Cyst excision    OTHER SURGICAL HISTORY  08/29/2019    Heart surgery       Social History     Socioeconomic History    Marital status:      Spouse name: Not on file    Number of children: Not on file    Years of education: Not on file    Highest education level: Not on file   Occupational History    Not on file   Tobacco Use    Smoking status: Never    Smokeless tobacco: Never   Vaping Use    Vaping status: Never Used   Substance and Sexual Activity    Alcohol use: Yes     Alcohol/week: 1.0 standard drink of alcohol     Types: 1 Glasses of wine per week    Drug use: Never    Sexual activity: Defer   Other Topics Concern    Not on file   Social History Narrative    Not on file     Social Drivers of Health     Financial Resource Strain: Not on file   Food Insecurity: Not on file   Transportation Needs: Not on file   Physical Activity: Not on file   Stress: Not on file   Social Connections: Not on file   Intimate Partner Violence: Not on file   Housing Stability:  Not on file       Allergies   Allergen Reactions    Cefaclor Anaphylaxis and Swelling    Cocoa Hives     Chocolate    Penicillins Swelling and Rash     FACIAL SWELLING          Current Outpatient Medications:     acetaminophen (Tylenol) 500 mg tablet, Take 650 mg by mouth every 6 hours if needed for mild pain (1 - 3)., Disp: , Rfl:     amLODIPine (Norvasc) 10 mg tablet, Take 1 tablet (10 mg) by mouth once daily., Disp: 90 tablet, Rfl: 1    aspirin 81 mg EC tablet, Take 1 tablet (81 mg) by mouth once daily., Disp: , Rfl:     atorvastatin (Lipitor) 40 mg tablet, Take 1 tablet (40 mg) by mouth once daily., Disp: 90 tablet, Rfl: 1    cyanocobalamin, vitamin B-12, (Vitamin B-12) 1,000 mcg tablet extended release, Take 1 tablet (1,000 mcg) by mouth once daily., Disp: , Rfl:     doxazosin (Cardura) 4 mg tablet, Take 1 tablet (4 mg) by mouth once daily., Disp: 90 tablet, Rfl: 1    folic acid (Folvite) 1 mg tablet, Take 1 tablet (1 mg) by mouth once daily., Disp: , Rfl:     hydroCHLOROthiazide (HYDRODiuril) 25 mg tablet, Take 1 tablet (25 mg) by mouth once daily., Disp: 90 tablet, Rfl: 1    ketoconazole (NIZOral) 2 % cream, Apply twice daily to affected areas of face, Disp: 60 g, Rfl: 11    loratadine (Claritin) 10 mg tablet, Take 1 tablet (10 mg) by mouth once daily., Disp: , Rfl:     losartan (Cozaar) 100 mg tablet, Take 1 tablet (100 mg) by mouth once daily., Disp: 90 tablet, Rfl: 1    metFORMIN (Glucophage) 1,000 mg tablet, Take 1 tablet (1,000 mg) by mouth 2 times a day., Disp: 180 tablet, Rfl: 1    multivitamin tablet, Take by mouth., Disp: , Rfl:     pioglitazone (Actos) 15 mg tablet, Take 1 tablet (15 mg) by mouth once daily., Disp: 90 tablet, Rfl: 1    spironolactone (Aldactone) 25 mg tablet, Take 1 tablet (25 mg) by mouth once daily., Disp: 90 tablet, Rfl: 1    sulfamethoxazole-trimethoprim (Bactrim) 400-80 mg tablet, Take 1 tablet by mouth 2 times a day for 10 days., Disp: 20 tablet, Rfl: 0    triamcinolone  (Nasacort) 55 mcg nasal inhaler, Administer 1-2 sprays into each nostril once daily., Disp: , Rfl:      Review of system:  All other systems have been reviewed and are negative for complaints      Last recorded vitals:  There were no vitals taken for this visit.    Physical Exam:  General: Appears comfortable and in no apparent distress.  Head: Normocephalic, atraumatic  Eyes: Non-injected conjunctiva, sclera clear, no proptosis  Lungs: Breathing is easy, non-labored. Speaking in clear and complete sentences. Normal diaphragmatic movement.  Cardiovascular: no peripheral edema, cyanosis or pallor.   Abdomen: soft, non-distended, non-tender  : Bladder: non tender, not distended; scrotal wound with pink heathy tissue, serous drainage, no foul smell noted.  MSK: Ambulatory with cane  Skin: No visible rashes or lesions  Neurologic: Alert, oriented to person, place, and time  Psychiatric: mood and affect appropriate      Imaging  === 05/01/24 ===    US SCROTUM WITH DOPPLERS    - Impression -  No sign of intrauterine mass or torsion.    Large left hydrocele with some internal debris and internal  septations. Underlying infection can not be excluded.    3 mm left epididymal head cyst.      MACRO:  None    Signed by: Toño Ellre 5/2/2024 6:19 PM  Dictation workstation:   [id number]    Labs  Office Visit on 03/14/2025   Component Date Value    POC Color, Urine 03/14/2025 Yellow     POC Appearance, Urine 03/14/2025 Clear     POC Glucose, Urine 03/14/2025 NEGATIVE     POC Bilirubin, Urine 03/14/2025 NEGATIVE     POC Ketones, Urine 03/14/2025 NEGATIVE     POC Specific Gravity, Ur* 03/14/2025 1.020     POC Blood, Urine 03/14/2025 NEGATIVE     POC PH, Urine 03/14/2025 6.0     POC Protein, Urine 03/14/2025 NEGATIVE     POC Urobilinogen, Urine 03/14/2025 0.2     Poc Nitrite, Urine 03/14/2025 NEGATIVE     POC Leukocytes, Urine 03/14/2025 NEGATIVE        Assessment and Plan:  Tomas Morataya is a 80 y.o. male with history of  left epididymal head cyst, ED, bilateral hydrocele  s/p bilateral hydrocelectomy and a bilateral spermatic cord block on 3/14/23; left hydrocele s/p left hydrocelectomy on 2/24/24, who presents for follow up and wound care.    Plan:  -Dr. House assessed wound during clinic visit  -Scrotal wound packed with dry gauze bandage. Patient tolerated dressing change well  -Continue antibiotics as prescribed  -Follow-up in 2 days for dressing change, or sooner if needed, to reassess symptoms and for medication refill.    All questions and concerns were addressed. Patient verbalizes understanding and has no other questions at this time.     Some elements copied from Dr. Red's note on 3/14/25, the elements have been updated and all reflect current decision making from today, 03/26/25    E&M visit today is associated with current or anticipated ongoing medical care services related to a patient's single, serious condition or a complex condition.    YAMILEX Lao-CNP   [facility]  03/26/25 9:39 AM

## 2025-03-26 ENCOUNTER — OFFICE VISIT (OUTPATIENT)
Dept: UROLOGY | Facility: HOSPITAL | Age: 80
End: 2025-03-26
Payer: COMMERCIAL

## 2025-03-26 DIAGNOSIS — Z98.890 HISTORY OF HYDROCELECTOMY: ICD-10-CM

## 2025-03-26 DIAGNOSIS — S31.30XS: Primary | ICD-10-CM

## 2025-03-26 PROCEDURE — 99213 OFFICE O/P EST LOW 20 MIN: CPT | Performed by: NURSE PRACTITIONER

## 2025-03-26 PROCEDURE — 1036F TOBACCO NON-USER: CPT | Performed by: NURSE PRACTITIONER

## 2025-03-26 PROCEDURE — 1160F RVW MEDS BY RX/DR IN RCRD: CPT | Performed by: NURSE PRACTITIONER

## 2025-03-26 PROCEDURE — G2211 COMPLEX E/M VISIT ADD ON: HCPCS | Performed by: NURSE PRACTITIONER

## 2025-03-26 PROCEDURE — 1159F MED LIST DOCD IN RCRD: CPT | Performed by: NURSE PRACTITIONER

## 2025-03-27 NOTE — PROGRESS NOTES
[facility]  Outpatient Clinic Note    Patient Name:  Tomas Morataya  MRN:  [id number]  :  1945  Date of Service: 3/28/2025     Visit type: Follow up visit    HPI    Interval History:  Tomas Morataya is a 80 y.o. male who is being seen today for  problems listed below.     Problem list/Chief complaints:  Bilateral hydrocele 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; s/p left hydrocelectomy 25  ED - on Viagra  Left epididymal head cyst      3/14/25: Visit with Dr. Red. Tomas Morataya is a 80 y.o. male who is being seen today for POV s/p left hydrocelectomy 25  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   -ED on Viagra  Healing well. Had some drainage but now improving. He is wearing a diaper for the leaking. On exam, it appears that the drainage is not coming from the incision site but instead from the back of the scrotum on the skin below the incision. I debrided some of the necrotic tissue and drained a large amount of fluid from the wound. Packed the area and applied gauze to the wound. Will come back on Monday and see Meghna to change the gauze. Will teach his wife to do the packing. Will see me back in 2 weeks.     3/17/25: Patient presents for follow up and dressing change. He is accompanied by his wife. He reports drainage from wound and is wearing pad to keep cloths dry. He is taking antibiotics and will complete the course tomorrow. He denies fever or chills.    3/19/25: Patient presents for follow up and scrotal wound packing. He is taking antibiotics as prescribed. He denies any urinary issues, no fever or chills.    3/21/25: Patient presents for follow up and wound packing. He noticed small amount of blood from his wound yesterday but it has since cleared up. He denies having any fever or chills.    3/24/25: Patient presents for follow up and wound packing. He reports some bleeding from wound site likely  due to sitting on hard surface, but bleeding has since resolved. He denies any other issues, no fever or chills.    3/26/25: Patient presents for follow up and wound care. He reports minimal bleeding from wound, no pain, no burning sensation, no fever or chills. Wound packing not present and might have fallen off earlier. Dr. House joined visit to assess patient's wound and states it's healing well.     3/26/25: Patient presents for follow up and dressing change. Wound packing not present on assessment and must have fallen out. Wound packed with no issues, patient tolerated well.    Past Medical History:   Diagnosis Date    Allergy to other foods     History of food allergy    BPH (benign prostatic hyperplasia)     Essential (primary) hypertension     Benign essential hypertension    Hyperlipidemia     Personal history of other endocrine, nutritional and metabolic disease     History of hyperlipidemia    Personal history of other endocrine, nutritional and metabolic disease     History of diabetes mellitus    Type 2 diabetes mellitus        Past Surgical History:   Procedure Laterality Date    OTHER SURGICAL HISTORY  08/29/2019    Cyst excision    OTHER SURGICAL HISTORY  08/29/2019    Heart surgery       Social History     Socioeconomic History    Marital status:      Spouse name: Not on file    Number of children: Not on file    Years of education: Not on file    Highest education level: Not on file   Occupational History    Not on file   Tobacco Use    Smoking status: Never    Smokeless tobacco: Never   Vaping Use    Vaping status: Never Used   Substance and Sexual Activity    Alcohol use: Yes     Alcohol/week: 1.0 standard drink of alcohol     Types: 1 Glasses of wine per week    Drug use: Never    Sexual activity: Defer   Other Topics Concern    Not on file   Social History Narrative    Not on file     Social Drivers of Health     Financial Resource Strain: Not on file   Food Insecurity: Not on file    Transportation Needs: Not on file   Physical Activity: Not on file   Stress: Not on file   Social Connections: Not on file   Intimate Partner Violence: Not on file   Housing Stability: Not on file       Allergies   Allergen Reactions    Cefaclor Anaphylaxis and Swelling    Cocoa Hives     Chocolate    Penicillins Swelling and Rash     FACIAL SWELLING          Current Outpatient Medications:     acetaminophen (Tylenol) 500 mg tablet, Take 650 mg by mouth every 6 hours if needed for mild pain (1 - 3)., Disp: , Rfl:     amLODIPine (Norvasc) 10 mg tablet, Take 1 tablet (10 mg) by mouth once daily., Disp: 90 tablet, Rfl: 1    aspirin 81 mg EC tablet, Take 1 tablet (81 mg) by mouth once daily., Disp: , Rfl:     atorvastatin (Lipitor) 40 mg tablet, Take 1 tablet (40 mg) by mouth once daily., Disp: 90 tablet, Rfl: 1    cyanocobalamin, vitamin B-12, (Vitamin B-12) 1,000 mcg tablet extended release, Take 1 tablet (1,000 mcg) by mouth once daily., Disp: , Rfl:     doxazosin (Cardura) 4 mg tablet, Take 1 tablet (4 mg) by mouth once daily., Disp: 90 tablet, Rfl: 1    folic acid (Folvite) 1 mg tablet, Take 1 tablet (1 mg) by mouth once daily., Disp: , Rfl:     hydroCHLOROthiazide (HYDRODiuril) 25 mg tablet, Take 1 tablet (25 mg) by mouth once daily., Disp: 90 tablet, Rfl: 1    ketoconazole (NIZOral) 2 % cream, Apply twice daily to affected areas of face, Disp: 60 g, Rfl: 11    loratadine (Claritin) 10 mg tablet, Take 1 tablet (10 mg) by mouth once daily., Disp: , Rfl:     losartan (Cozaar) 100 mg tablet, Take 1 tablet (100 mg) by mouth once daily., Disp: 90 tablet, Rfl: 1    metFORMIN (Glucophage) 1,000 mg tablet, Take 1 tablet (1,000 mg) by mouth 2 times a day., Disp: 180 tablet, Rfl: 1    multivitamin tablet, Take by mouth., Disp: , Rfl:     pioglitazone (Actos) 15 mg tablet, Take 1 tablet (15 mg) by mouth once daily., Disp: 90 tablet, Rfl: 1    spironolactone (Aldactone) 25 mg tablet, Take 1 tablet (25 mg) by mouth once  daily., Disp: 90 tablet, Rfl: 1    triamcinolone (Nasacort) 55 mcg nasal inhaler, Administer 1-2 sprays into each nostril once daily., Disp: , Rfl:      Review of system:  All other systems have been reviewed and are negative for complaints      Last recorded vitals:  There were no vitals taken for this visit.    Physical Exam:  General: Appears comfortable and in no apparent distress.  Head: Normocephalic, atraumatic  Eyes: Non-injected conjunctiva, sclera clear, no proptosis  Lungs: Breathing is easy, non-labored. Speaking in clear and complete sentences. Normal diaphragmatic movement.  Cardiovascular: no peripheral edema, cyanosis or pallor.   Abdomen: soft, non-distended, non-tender  : Bladder: non tender, not distended; scrotal wound with pink heathy tissue, serous drainage, no foul smell noted.  MSK: Ambulatory with cane  Skin: No visible rashes or lesions  Neurologic: Alert, oriented to person, place, and time  Psychiatric: mood and affect appropriate      Imaging  === 05/01/24 ===    US SCROTUM WITH DOPPLERS    - Impression -  No sign of intrauterine mass or torsion.    Large left hydrocele with some internal debris and internal  septations. Underlying infection can not be excluded.    3 mm left epididymal head cyst.      MACRO:  None    Signed by: Toño Eller 5/2/2024 6:19 PM  Dictation workstation:   [id number]    Labs  Office Visit on 03/14/2025   Component Date Value    POC Color, Urine 03/14/2025 Yellow     POC Appearance, Urine 03/14/2025 Clear     POC Glucose, Urine 03/14/2025 NEGATIVE     POC Bilirubin, Urine 03/14/2025 NEGATIVE     POC Ketones, Urine 03/14/2025 NEGATIVE     POC Specific Gravity, Ur* 03/14/2025 1.020     POC Blood, Urine 03/14/2025 NEGATIVE     POC PH, Urine 03/14/2025 6.0     POC Protein, Urine 03/14/2025 NEGATIVE     POC Urobilinogen, Urine 03/14/2025 0.2     Poc Nitrite, Urine 03/14/2025 NEGATIVE     POC Leukocytes, Urine 03/14/2025 NEGATIVE        Assessment and  Plan:  Tomas Morataya is a 80 y.o. male with history of left epididymal head cyst, ED, bilateral hydrocele  s/p bilateral hydrocelectomy and a bilateral spermatic cord block on 3/14/23; left hydrocele s/p left hydrocelectomy on 2/24/24, who presents for follow up and wound care.    Plan:  -Scrotal wound packed with dry gauze bandage. Patient tolerated dressing change well  -Patient to use bacitracin on scrotal wound to help with healing  -Follow-up in 3 days for dressing change, or sooner if needed, to reassess symptoms and for medication refill.    All questions and concerns were addressed. Patient verbalizes understanding and has no other questions at this time.     Some elements copied from Dr. Red's note on 3/14/25, the elements have been updated and all reflect current decision making from today, 03/28/25    E&M visit today is associated with current or anticipated ongoing medical care services related to a patient's single, serious condition or a complex condition.    YAMILEX Lao-CNP   [facility]  03/28/25 2:29 PM

## 2025-03-28 ENCOUNTER — APPOINTMENT (OUTPATIENT)
Dept: UROLOGY | Facility: HOSPITAL | Age: 80
End: 2025-03-28
Payer: COMMERCIAL

## 2025-03-28 ENCOUNTER — OFFICE VISIT (OUTPATIENT)
Dept: UROLOGY | Facility: HOSPITAL | Age: 80
End: 2025-03-28
Payer: COMMERCIAL

## 2025-03-28 DIAGNOSIS — Z98.890 HISTORY OF HYDROCELECTOMY: ICD-10-CM

## 2025-03-28 DIAGNOSIS — S31.30XS: Primary | ICD-10-CM

## 2025-03-28 PROCEDURE — 1159F MED LIST DOCD IN RCRD: CPT | Performed by: NURSE PRACTITIONER

## 2025-03-28 PROCEDURE — 1036F TOBACCO NON-USER: CPT | Performed by: NURSE PRACTITIONER

## 2025-03-28 PROCEDURE — G2211 COMPLEX E/M VISIT ADD ON: HCPCS | Performed by: NURSE PRACTITIONER

## 2025-03-28 PROCEDURE — 1160F RVW MEDS BY RX/DR IN RCRD: CPT | Performed by: NURSE PRACTITIONER

## 2025-03-28 PROCEDURE — 99213 OFFICE O/P EST LOW 20 MIN: CPT | Performed by: NURSE PRACTITIONER

## 2025-03-30 NOTE — PROGRESS NOTES
[facility]  Outpatient Clinic Note    Patient Name:  Tomas Moartaya  MRN:  [id number]  :  1945  Date of Service: 3/31/2025     Visit type: Follow up visit    HPI    Interval History:  Tomas Morataya is a 80 y.o. male who is being seen today for  problems listed below.     Problem list/Chief complaints:  Bilateral hydrocele 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; s/p left hydrocelectomy 25  ED - on Viagra  Left epididymal head cyst      3/14/25: Visit with Dr. Red. Tomas Morataya is a 80 y.o. male who is being seen today for POV s/p left hydrocelectomy 25  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   -ED on Viagra  Healing well. Had some drainage but now improving. He is wearing a diaper for the leaking. On exam, it appears that the drainage is not coming from the incision site but instead from the back of the scrotum on the skin below the incision. I debrided some of the necrotic tissue and drained a large amount of fluid from the wound. Packed the area and applied gauze to the wound. Will come back on Monday and see Meghna to change the gauze. Will teach his wife to do the packing. Will see me back in 2 weeks.     3/17/25: Patient presents for follow up and dressing change. He is accompanied by his wife. He reports drainage from wound and is wearing pad to keep cloths dry. He is taking antibiotics and will complete the course tomorrow. He denies fever or chills.    3/19/25: Patient presents for follow up and scrotal wound packing. He is taking antibiotics as prescribed. He denies any urinary issues, no fever or chills.    3/21/25: Patient presents for follow up and wound packing. He noticed small amount of blood from his wound yesterday but it has since cleared up. He denies having any fever or chills.    3/24/25: Patient presents for follow up and wound packing. He reports some bleeding from wound site likely  due to sitting on hard surface, but bleeding has since resolved. He denies any other issues, no fever or chills.    3/26/25: Patient presents for follow up and wound care. He reports minimal bleeding from wound, no pain, no burning sensation, no fever or chills. Wound packing not present and might have fallen off earlier. Dr. House joined visit to assess patient's wound and states it's healing well.     3/26/25: Patient presents for follow up and dressing change. Wound packing not present on assessment and must have fallen out. Wound packed with no issues, patient tolerated well.    3/31/25: Patient presents for follow up and dressing change. He states packing fell out while he was using the bathroom yesterday, he noted small amount of bloody drainage that resolved. He denies fever or chills. Wound packed with no issues, patient tolerated well, advised to apply bacitracin to wound.    Past Medical History:   Diagnosis Date    Allergy to other foods     History of food allergy    BPH (benign prostatic hyperplasia)     Essential (primary) hypertension     Benign essential hypertension    Hyperlipidemia     Personal history of other endocrine, nutritional and metabolic disease     History of hyperlipidemia    Personal history of other endocrine, nutritional and metabolic disease     History of diabetes mellitus    Type 2 diabetes mellitus        Past Surgical History:   Procedure Laterality Date    OTHER SURGICAL HISTORY  08/29/2019    Cyst excision    OTHER SURGICAL HISTORY  08/29/2019    Heart surgery       Social History     Socioeconomic History    Marital status:      Spouse name: Not on file    Number of children: Not on file    Years of education: Not on file    Highest education level: Not on file   Occupational History    Not on file   Tobacco Use    Smoking status: Never    Smokeless tobacco: Never   Vaping Use    Vaping status: Never Used   Substance and Sexual Activity    Alcohol use: Yes      Alcohol/week: 1.0 standard drink of alcohol     Types: 1 Glasses of wine per week    Drug use: Never    Sexual activity: Defer   Other Topics Concern    Not on file   Social History Narrative    Not on file     Social Drivers of Health     Financial Resource Strain: Not on file   Food Insecurity: Not on file   Transportation Needs: Not on file   Physical Activity: Not on file   Stress: Not on file   Social Connections: Not on file   Intimate Partner Violence: Not on file   Housing Stability: Not on file       Allergies   Allergen Reactions    Cefaclor Anaphylaxis and Swelling    Cocoa Hives     Chocolate    Penicillins Swelling and Rash     FACIAL SWELLING          Current Outpatient Medications:     acetaminophen (Tylenol) 500 mg tablet, Take 650 mg by mouth every 6 hours if needed for mild pain (1 - 3)., Disp: , Rfl:     amLODIPine (Norvasc) 10 mg tablet, Take 1 tablet (10 mg) by mouth once daily., Disp: 90 tablet, Rfl: 1    aspirin 81 mg EC tablet, Take 1 tablet (81 mg) by mouth once daily., Disp: , Rfl:     atorvastatin (Lipitor) 40 mg tablet, Take 1 tablet (40 mg) by mouth once daily., Disp: 90 tablet, Rfl: 1    cyanocobalamin, vitamin B-12, (Vitamin B-12) 1,000 mcg tablet extended release, Take 1 tablet (1,000 mcg) by mouth once daily., Disp: , Rfl:     doxazosin (Cardura) 4 mg tablet, Take 1 tablet (4 mg) by mouth once daily., Disp: 90 tablet, Rfl: 1    folic acid (Folvite) 1 mg tablet, Take 1 tablet (1 mg) by mouth once daily., Disp: , Rfl:     hydroCHLOROthiazide (HYDRODiuril) 25 mg tablet, Take 1 tablet (25 mg) by mouth once daily., Disp: 90 tablet, Rfl: 1    ketoconazole (NIZOral) 2 % cream, Apply twice daily to affected areas of face, Disp: 60 g, Rfl: 11    loratadine (Claritin) 10 mg tablet, Take 1 tablet (10 mg) by mouth once daily., Disp: , Rfl:     losartan (Cozaar) 100 mg tablet, Take 1 tablet (100 mg) by mouth once daily., Disp: 90 tablet, Rfl: 1    metFORMIN (Glucophage) 1,000 mg tablet, Take 1  tablet (1,000 mg) by mouth 2 times a day., Disp: 180 tablet, Rfl: 1    multivitamin tablet, Take by mouth., Disp: , Rfl:     pioglitazone (Actos) 15 mg tablet, Take 1 tablet (15 mg) by mouth once daily., Disp: 90 tablet, Rfl: 1    spironolactone (Aldactone) 25 mg tablet, Take 1 tablet (25 mg) by mouth once daily., Disp: 90 tablet, Rfl: 1    triamcinolone (Nasacort) 55 mcg nasal inhaler, Administer 1-2 sprays into each nostril once daily., Disp: , Rfl:      Review of system:  All other systems have been reviewed and are negative for complaints      Last recorded vitals:  There were no vitals taken for this visit.    Physical Exam:  General: Appears comfortable and in no apparent distress.  Head: Normocephalic, atraumatic  Eyes: Non-injected conjunctiva, sclera clear, no proptosis  Lungs: Breathing is easy, non-labored. Speaking in clear and complete sentences. Normal diaphragmatic movement.  Cardiovascular: no peripheral edema, cyanosis or pallor.   Abdomen: soft, non-distended, non-tender  : Bladder: non tender, not distended; scrotal wound with pink heathy tissue, serous drainage, no foul smell noted.  MSK: Ambulatory with cane  Skin: No visible rashes or lesions  Neurologic: Alert, oriented to person, place, and time  Psychiatric: mood and affect appropriate      Imaging  === 05/01/24 ===    US SCROTUM WITH DOPPLERS    - Impression -  No sign of intrauterine mass or torsion.    Large left hydrocele with some internal debris and internal  septations. Underlying infection can not be excluded.    3 mm left epididymal head cyst.      MACRO:  None    Signed by: Toño Eller 5/2/2024 6:19 PM  Dictation workstation:   [id number]    Labs  Office Visit on 03/14/2025   Component Date Value    POC Color, Urine 03/14/2025 Yellow     POC Appearance, Urine 03/14/2025 Clear     POC Glucose, Urine 03/14/2025 NEGATIVE     POC Bilirubin, Urine 03/14/2025 NEGATIVE     POC Ketones, Urine 03/14/2025 NEGATIVE     POC  Specific Gravity, Ur* 03/14/2025 1.020     POC Blood, Urine 03/14/2025 NEGATIVE     POC PH, Urine 03/14/2025 6.0     POC Protein, Urine 03/14/2025 NEGATIVE     POC Urobilinogen, Urine 03/14/2025 0.2     Poc Nitrite, Urine 03/14/2025 NEGATIVE     POC Leukocytes, Urine 03/14/2025 NEGATIVE        Assessment and Plan:  Tomas Morataya is a 80 y.o. male with history of left epididymal head cyst, ED, bilateral hydrocele  s/p bilateral hydrocelectomy and a bilateral spermatic cord block on 3/14/23; left hydrocele s/p left hydrocelectomy on 2/24/24, who presents for follow up and wound care.    Plan:  -Scrotal wound packed with dry gauze bandage. Patient tolerated dressing change well  -Patient to use bacitracin on scrotal wound to help with healing  -Follow-up in 3 days for dressing change, or sooner if needed, to reassess symptoms and for medication refill.    All questions and concerns were addressed. Patient verbalizes understanding and has no other questions at this time.     Some elements copied from my note on 3/28/25, the elements have been updated and all reflect current decision making from today, 03/31/25    E&M visit today is associated with current or anticipated ongoing medical care services related to a patient's single, serious condition or a complex condition.    YAMILEX Lao-CNP   [facility]  03/31/25 12:52 PM

## 2025-03-31 ENCOUNTER — OFFICE VISIT (OUTPATIENT)
Dept: UROLOGY | Facility: HOSPITAL | Age: 80
End: 2025-03-31
Payer: COMMERCIAL

## 2025-03-31 DIAGNOSIS — Z48.89 POSTOPERATIVE VISIT: ICD-10-CM

## 2025-03-31 DIAGNOSIS — Z98.890 HISTORY OF HYDROCELECTOMY: ICD-10-CM

## 2025-03-31 DIAGNOSIS — S31.30XS: Primary | ICD-10-CM

## 2025-03-31 PROCEDURE — 99213 OFFICE O/P EST LOW 20 MIN: CPT | Performed by: NURSE PRACTITIONER

## 2025-03-31 PROCEDURE — 1159F MED LIST DOCD IN RCRD: CPT | Performed by: NURSE PRACTITIONER

## 2025-03-31 PROCEDURE — G2211 COMPLEX E/M VISIT ADD ON: HCPCS | Performed by: NURSE PRACTITIONER

## 2025-04-02 ENCOUNTER — OFFICE VISIT (OUTPATIENT)
Dept: UROLOGY | Facility: HOSPITAL | Age: 80
End: 2025-04-02
Payer: COMMERCIAL

## 2025-04-02 DIAGNOSIS — S31.30XS: Primary | ICD-10-CM

## 2025-04-02 DIAGNOSIS — Z98.890 HISTORY OF HYDROCELECTOMY: ICD-10-CM

## 2025-04-02 PROCEDURE — G2211 COMPLEX E/M VISIT ADD ON: HCPCS | Performed by: NURSE PRACTITIONER

## 2025-04-02 PROCEDURE — 1160F RVW MEDS BY RX/DR IN RCRD: CPT | Performed by: NURSE PRACTITIONER

## 2025-04-02 PROCEDURE — 99213 OFFICE O/P EST LOW 20 MIN: CPT | Performed by: NURSE PRACTITIONER

## 2025-04-02 PROCEDURE — 1036F TOBACCO NON-USER: CPT | Performed by: NURSE PRACTITIONER

## 2025-04-02 PROCEDURE — 1159F MED LIST DOCD IN RCRD: CPT | Performed by: NURSE PRACTITIONER

## 2025-04-02 NOTE — PROGRESS NOTES
[facility]  Outpatient Clinic Note    Patient Name:  Tomas Morataya  MRN:  [id number]  :  1945  Date of Service: 2025     Visit type: Follow up visit    HPI    Interval History:  Tomas Morataya is a 80 y.o. male who is being seen today for  problems listed below.     Problem list/Chief complaints:  Bilateral hydrocele 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; s/p left hydrocelectomy 25  ED - on Viagra  Left epididymal head cyst      3/14/25: Visit with Dr. Red. Tomas Morataya is a 80 y.o. male who is being seen today for POV s/p left hydrocelectomy 25  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   -ED on Viagra  Healing well. Had some drainage but now improving. He is wearing a diaper for the leaking. On exam, it appears that the drainage is not coming from the incision site but instead from the back of the scrotum on the skin below the incision. I debrided some of the necrotic tissue and drained a large amount of fluid from the wound. Packed the area and applied gauze to the wound. Will come back on Monday and see Meghna to change the gauze. Will teach his wife to do the packing. Will see me back in 2 weeks.     3/17/25: Patient presents for follow up and dressing change. He is accompanied by his wife. He reports drainage from wound and is wearing pad to keep cloths dry. He is taking antibiotics and will complete the course tomorrow. He denies fever or chills.    3/19/25: Patient presents for follow up and scrotal wound packing. He is taking antibiotics as prescribed. He denies any urinary issues, no fever or chills.    3/21/25: Patient presents for follow up and wound packing. He noticed small amount of blood from his wound yesterday but it has since cleared up. He denies having any fever or chills.    3/24/25: Patient presents for follow up and wound packing. He reports some bleeding from wound site likely due  to sitting on hard surface, but bleeding has since resolved. He denies any other issues, no fever or chills.    3/26/25: Patient presents for follow up and wound care. He reports minimal bleeding from wound, no pain, no burning sensation, no fever or chills. Wound packing not present and might have fallen off earlier. Dr. House joined visit to assess patient's wound and states it's healing well.     3/26/25: Patient presents for follow up and dressing change. Wound packing not present on assessment and must have fallen out. Wound packed with no issues, patient tolerated well.    3/31/25: Patient presents for follow up and dressing change. He states packing fell out while he was using the bathroom yesterday, he noted small amount of bloody drainage that resolved. He denies fever or chills. Wound packed with no issues, patient tolerated well, advised to apply bacitracin to wound.    4/2/25: Patient presents for follow up and wound dressing change. He states he's doing fine, no fever or chills.    Past Medical History:   Diagnosis Date    Allergy to other foods     History of food allergy    BPH (benign prostatic hyperplasia)     Essential (primary) hypertension     Benign essential hypertension    Hyperlipidemia     Personal history of other endocrine, nutritional and metabolic disease     History of hyperlipidemia    Personal history of other endocrine, nutritional and metabolic disease     History of diabetes mellitus    Type 2 diabetes mellitus        Past Surgical History:   Procedure Laterality Date    OTHER SURGICAL HISTORY  08/29/2019    Cyst excision    OTHER SURGICAL HISTORY  08/29/2019    Heart surgery       Social History     Socioeconomic History    Marital status:      Spouse name: Not on file    Number of children: Not on file    Years of education: Not on file    Highest education level: Not on file   Occupational History    Not on file   Tobacco Use    Smoking status: Never    Smokeless  tobacco: Never   Vaping Use    Vaping status: Never Used   Substance and Sexual Activity    Alcohol use: Yes     Alcohol/week: 1.0 standard drink of alcohol     Types: 1 Glasses of wine per week    Drug use: Never    Sexual activity: Defer   Other Topics Concern    Not on file   Social History Narrative    Not on file     Social Drivers of Health     Financial Resource Strain: Not on file   Food Insecurity: Not on file   Transportation Needs: Not on file   Physical Activity: Not on file   Stress: Not on file   Social Connections: Not on file   Intimate Partner Violence: Not on file   Housing Stability: Not on file       Allergies   Allergen Reactions    Cefaclor Anaphylaxis and Swelling    Cocoa Hives     Chocolate    Penicillins Swelling and Rash     FACIAL SWELLING          Current Outpatient Medications:     acetaminophen (Tylenol) 500 mg tablet, Take 650 mg by mouth every 6 hours if needed for mild pain (1 - 3)., Disp: , Rfl:     amLODIPine (Norvasc) 10 mg tablet, Take 1 tablet (10 mg) by mouth once daily., Disp: 90 tablet, Rfl: 1    aspirin 81 mg EC tablet, Take 1 tablet (81 mg) by mouth once daily., Disp: , Rfl:     atorvastatin (Lipitor) 40 mg tablet, Take 1 tablet (40 mg) by mouth once daily., Disp: 90 tablet, Rfl: 1    cyanocobalamin, vitamin B-12, (Vitamin B-12) 1,000 mcg tablet extended release, Take 1 tablet (1,000 mcg) by mouth once daily., Disp: , Rfl:     doxazosin (Cardura) 4 mg tablet, Take 1 tablet (4 mg) by mouth once daily., Disp: 90 tablet, Rfl: 1    folic acid (Folvite) 1 mg tablet, Take 1 tablet (1 mg) by mouth once daily., Disp: , Rfl:     hydroCHLOROthiazide (HYDRODiuril) 25 mg tablet, Take 1 tablet (25 mg) by mouth once daily., Disp: 90 tablet, Rfl: 1    ketoconazole (NIZOral) 2 % cream, Apply twice daily to affected areas of face, Disp: 60 g, Rfl: 11    loratadine (Claritin) 10 mg tablet, Take 1 tablet (10 mg) by mouth once daily., Disp: , Rfl:     losartan (Cozaar) 100 mg tablet, Take 1  tablet (100 mg) by mouth once daily., Disp: 90 tablet, Rfl: 1    metFORMIN (Glucophage) 1,000 mg tablet, Take 1 tablet (1,000 mg) by mouth 2 times a day., Disp: 180 tablet, Rfl: 1    multivitamin tablet, Take by mouth., Disp: , Rfl:     pioglitazone (Actos) 15 mg tablet, Take 1 tablet (15 mg) by mouth once daily., Disp: 90 tablet, Rfl: 1    spironolactone (Aldactone) 25 mg tablet, Take 1 tablet (25 mg) by mouth once daily., Disp: 90 tablet, Rfl: 1    triamcinolone (Nasacort) 55 mcg nasal inhaler, Administer 1-2 sprays into each nostril once daily., Disp: , Rfl:      Review of system:  All other systems have been reviewed and are negative for complaints      Last recorded vitals:  There were no vitals taken for this visit.    Physical Exam:  General: Appears comfortable and in no apparent distress.  Head: Normocephalic, atraumatic  Eyes: Non-injected conjunctiva, sclera clear, no proptosis  Lungs: Breathing is easy, non-labored. Speaking in clear and complete sentences. Normal diaphragmatic movement.  Cardiovascular: no peripheral edema, cyanosis or pallor.   Abdomen: soft, non-distended, non-tender  : Bladder: non tender, not distended; scrotal wound with pink heathy tissue, minimal serous drainage, no foul smell noted.  MSK: Ambulatory with cane  Skin: No visible rashes or lesions  Neurologic: Alert, oriented to person, place, and time  Psychiatric: mood and affect appropriate      Imaging  === 05/01/24 ===    US SCROTUM WITH DOPPLERS    - Impression -  No sign of intrauterine mass or torsion.    Large left hydrocele with some internal debris and internal  septations. Underlying infection can not be excluded.    3 mm left epididymal head cyst.      MACRO:  None    Signed by: Toño Eller 5/2/2024 6:19 PM  Dictation workstation:   [id number]    Labs  Office Visit on 03/14/2025   Component Date Value    POC Color, Urine 03/14/2025 Yellow     POC Appearance, Urine 03/14/2025 Clear     POC Glucose, Urine  03/14/2025 NEGATIVE     POC Bilirubin, Urine 03/14/2025 NEGATIVE     POC Ketones, Urine 03/14/2025 NEGATIVE     POC Specific Gravity, Ur* 03/14/2025 1.020     POC Blood, Urine 03/14/2025 NEGATIVE     POC PH, Urine 03/14/2025 6.0     POC Protein, Urine 03/14/2025 NEGATIVE     POC Urobilinogen, Urine 03/14/2025 0.2     Poc Nitrite, Urine 03/14/2025 NEGATIVE     POC Leukocytes, Urine 03/14/2025 NEGATIVE        Assessment and Plan:  Tomas Morataya is a 80 y.o. male with history of left epididymal head cyst, ED, bilateral hydrocele  s/p bilateral hydrocelectomy and a bilateral spermatic cord block on 3/14/23; left hydrocele s/p left hydrocelectomy on 2/24/24, who presents for follow up and wound care.    Plan:  -Scrotal wound packed with dry gauze bandage. Patient tolerated dressing change well  -Patient to use bacitracin on scrotal wound to help with healing  -Follow-up in 2 days for dressing change, or sooner if needed, to reassess symptoms and for medication refill.    All questions and concerns were addressed. Patient verbalizes understanding and has no other questions at this time.     Some elements copied from my note on 3/31/25, the elements have been updated and all reflect current decision making from today, 04/02/25    E&M visit today is associated with current or anticipated ongoing medical care services related to a patient's single, serious condition or a complex condition.    YAMILEX Lao-CNP   [facility]  04/02/25 11:57 AM

## 2025-04-04 ENCOUNTER — OFFICE VISIT (OUTPATIENT)
Dept: UROLOGY | Facility: HOSPITAL | Age: 80
End: 2025-04-04
Payer: COMMERCIAL

## 2025-04-04 DIAGNOSIS — S31.30XS: Primary | ICD-10-CM

## 2025-04-04 PROCEDURE — 99211 OFF/OP EST MAY X REQ PHY/QHP: CPT | Performed by: UROLOGY

## 2025-04-04 NOTE — PROGRESS NOTES
FUV    Last visit - 3/14/25     HISTORY OF PRESENT ILLNESS:   Tomas Morataya is a 80 y.o. male who is being seen today for FUV s/p left hydrocelectomy 2/24/25  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   -ED on Viagra  PAST MEDICAL HISTORY:  Past Medical History:   Diagnosis Date    Allergy to other foods     History of food allergy    BPH (benign prostatic hyperplasia)     Essential (primary) hypertension     Benign essential hypertension    Hyperlipidemia     Personal history of other endocrine, nutritional and metabolic disease     History of hyperlipidemia    Personal history of other endocrine, nutritional and metabolic disease     History of diabetes mellitus    Type 2 diabetes mellitus        PAST SURGICAL HISTORY:  Past Surgical History:   Procedure Laterality Date    OTHER SURGICAL HISTORY  08/29/2019    Cyst excision    OTHER SURGICAL HISTORY  08/29/2019    Heart surgery        ALLERGIES:   Allergies   Allergen Reactions    Cefaclor Anaphylaxis and Swelling    Cocoa Hives     Chocolate    Penicillins Swelling and Rash     FACIAL SWELLING        MEDICATIONS:   Current Outpatient Medications   Medication Instructions    acetaminophen (TYLENOL) 650 mg, Every 6 hours PRN    amLODIPine (NORVASC) 10 mg, oral, Daily    aspirin 81 mg EC tablet 1 tablet, Daily    atorvastatin (LIPITOR) 40 mg, oral, Daily    cyanocobalamin, vitamin B-12, (Vitamin B-12) 1,000 mcg tablet extended release 1 tablet, Daily    doxazosin (CARDURA) 4 mg, oral, Daily    folic acid (Folvite) 1 mg tablet 1 tablet, Daily    hydroCHLOROthiazide (HYDRODIURIL) 25 mg, oral, Daily    ketoconazole (NIZOral) 2 % cream Apply twice daily to affected areas of face    loratadine (Claritin) 10 mg tablet 1 tablet, Daily    losartan (COZAAR) 100 mg, oral, Daily    metFORMIN (GLUCOPHAGE) 1,000 mg, oral, 2 times daily    multivitamin tablet Take by mouth.    pioglitazone (ACTOS) 15 mg, oral, Daily    spironolactone  "(ALDACTONE) 25 mg, oral, Daily    triamcinolone (Nasacort) 55 mcg nasal inhaler 1-2 sprays, Daily        PHYSICAL EXAM:  There were no vitals taken for this visit.  Constitutional: Patient appears well-developed and well-nourished. No distress.    Pulmonary/Chest: Effort normal. No respiratory distress.   Abdominal: Soft, ND NT  : fullness of the right testicle  Musculoskeletal: Normal range of motion.    Neurological: Alert and oriented to person, place, and time.  Psychiatric: Normal mood and affect. Behavior is normal. Thought content normal.      Labs:  No results found for: \"TESTOSTERONE\"  Lab Results   Component Value Date    PSA 1.73 10/04/2024     No components found for: \"CBC\"  Lab Results   Component Value Date    CREATININE 0.72 03/08/2025     No components found for: \"TESTOTMS\"  No results found for: \"TESTF\"    Imaging: Scrotal US on 5/1/24 demonstrated No sign of intrauterine mass or torsion.  Large left hydrocele with some internal debris and internal septations. Underlying infection can not be excluded.  3 mm left epididymal head cyst.    Discussion:  Patient is s/p left hydrocelectomy on 2/24/25. Healing well. Had some drainage from the back of the scrotum on the skin below the incision which is now improving. At the last visit, we had debrided some of the necrotic tissue and drained a large amount of fluid from the wound. He has been following with Meghna to change dressings. Today again we packed the upper portion and applied gauze to the wound. Will come back on Monday next week and see Meghna to change the gauze.       Assessment:      No diagnosis found.      Tomas Morataya is a 80 y.o. male here for FUV     Plan:   1)fu with Meghna Monday next week to change dressing.  2) fu with me in 4-6 weeks  All questions and concerns were addressed. Patient verbalizes understanding and has no other questions at this time.     Scribe Attestation  By signing my name below, IAliyah , Diony   attest " that this documentation has been prepared under the direction and in the presence of Marc Red MD.

## 2025-04-06 NOTE — PROGRESS NOTES
[facility]  Outpatient Clinic Note    Patient Name:  Tomas Morataya  MRN:  [id number]  :  1945  Date of Service: 2025     Visit type: Follow up visit    HPI    Interval History:  Tomas Morataya is a 80 y.o. male who is being seen today for  problems listed below.     Problem list/Chief complaints:  Bilateral hydrocele 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; s/p left hydrocelectomy 25  ED - on Viagra  Left epididymal head cyst      3/14/25: Visit with Dr. Red. Tomas Morataya is a 80 y.o. male who is being seen today for POV s/p left hydrocelectomy 25  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   -ED on Viagra  Healing well. Had some drainage but now improving. He is wearing a diaper for the leaking. On exam, it appears that the drainage is not coming from the incision site but instead from the back of the scrotum on the skin below the incision. I debrided some of the necrotic tissue and drained a large amount of fluid from the wound. Packed the area and applied gauze to the wound. Will come back on Monday and see Meghna to change the gauze. Will teach his wife to do the packing. Will see me back in 2 weeks.     3/17/25: Patient presents for follow up and dressing change. He is accompanied by his wife. He reports drainage from wound and is wearing pad to keep cloths dry. He is taking antibiotics and will complete the course tomorrow. He denies fever or chills.    3/19/25: Patient presents for follow up and scrotal wound packing. He is taking antibiotics as prescribed. He denies any urinary issues, no fever or chills.    3/21/25: Patient presents for follow up and wound packing. He noticed small amount of blood from his wound yesterday but it has since cleared up. He denies having any fever or chills.    3/24/25: Patient presents for follow up and wound packing. He reports some bleeding from wound site likely due  to sitting on hard surface, but bleeding has since resolved. He denies any other issues, no fever or chills.    3/26/25: Patient presents for follow up and wound care. He reports minimal bleeding from wound, no pain, no burning sensation, no fever or chills. Wound packing not present and might have fallen off earlier. Dr. House joined visit to assess patient's wound and states it's healing well.     3/26/25: Patient presents for follow up and dressing change. Wound packing not present on assessment and must have fallen out. Wound packed with no issues, patient tolerated well.    3/31/25: Patient presents for follow up and dressing change. He states packing fell out while he was using the bathroom yesterday, he noted small amount of bloody drainage that resolved. He denies fever or chills. Wound packed with no issues, patient tolerated well, advised to apply bacitracin to wound.    4/2/25: Patient presents for follow up and wound dressing change. He states he's doing fine, no fever or chills.    4/4/25: Visit with Dr. Red. Patient is s/p left hydrocelectomy on 2/24/25. Healing well. Had some drainage from the back of the scrotum on the skin below the incision which is now improving. At the last visit, we had debrided some of the necrotic tissue and drained a large amount of fluid from the wound. He has been following with Meghna to change dressings. Today again we packed the upper portion and applied gauze to the wound. Will come back on Monday next week and see Meghna to change the gauze.     4/7/25: Patient presents for follow up and dressing change. Patient reports drainage from wound and states he removed the dressing yesterday in the shower. He denies pain, no fever or chills.      Past Medical History:   Diagnosis Date    Allergy to other foods     History of food allergy    BPH (benign prostatic hyperplasia)     Essential (primary) hypertension     Benign essential hypertension    Hyperlipidemia     Personal  history of other endocrine, nutritional and metabolic disease     History of hyperlipidemia    Personal history of other endocrine, nutritional and metabolic disease     History of diabetes mellitus    Type 2 diabetes mellitus        Past Surgical History:   Procedure Laterality Date    OTHER SURGICAL HISTORY  08/29/2019    Cyst excision    OTHER SURGICAL HISTORY  08/29/2019    Heart surgery       Social History     Socioeconomic History    Marital status:      Spouse name: Not on file    Number of children: Not on file    Years of education: Not on file    Highest education level: Not on file   Occupational History    Not on file   Tobacco Use    Smoking status: Never    Smokeless tobacco: Never   Vaping Use    Vaping status: Never Used   Substance and Sexual Activity    Alcohol use: Yes     Alcohol/week: 1.0 standard drink of alcohol     Types: 1 Glasses of wine per week    Drug use: Never    Sexual activity: Defer   Other Topics Concern    Not on file   Social History Narrative    Not on file     Social Drivers of Health     Financial Resource Strain: Not on file   Food Insecurity: Not on file   Transportation Needs: Not on file   Physical Activity: Not on file   Stress: Not on file   Social Connections: Not on file   Intimate Partner Violence: Not on file   Housing Stability: Not on file       Allergies   Allergen Reactions    Cefaclor Anaphylaxis and Swelling    Penicillins Swelling and Rash     FACIAL SWELLING          Current Outpatient Medications:     acetaminophen (Tylenol) 500 mg tablet, Take 650 mg by mouth every 6 hours if needed for mild pain (1 - 3)., Disp: , Rfl:     amLODIPine (Norvasc) 10 mg tablet, Take 1 tablet (10 mg) by mouth once daily., Disp: 90 tablet, Rfl: 1    aspirin 81 mg EC tablet, Take 1 tablet (81 mg) by mouth once daily., Disp: , Rfl:     atorvastatin (Lipitor) 40 mg tablet, Take 1 tablet (40 mg) by mouth once daily., Disp: 90 tablet, Rfl: 1    cyanocobalamin, vitamin B-12,  (Vitamin B-12) 1,000 mcg tablet extended release, Take 1 tablet (1,000 mcg) by mouth once daily., Disp: , Rfl:     doxazosin (Cardura) 4 mg tablet, Take 1 tablet (4 mg) by mouth once daily., Disp: 90 tablet, Rfl: 1    folic acid (Folvite) 1 mg tablet, Take 1 tablet (1 mg) by mouth once daily., Disp: , Rfl:     hydroCHLOROthiazide (HYDRODiuril) 25 mg tablet, Take 1 tablet (25 mg) by mouth once daily., Disp: 90 tablet, Rfl: 1    ketoconazole (NIZOral) 2 % cream, Apply twice daily to affected areas of face, Disp: 60 g, Rfl: 11    loratadine (Claritin) 10 mg tablet, Take 1 tablet (10 mg) by mouth once daily., Disp: , Rfl:     losartan (Cozaar) 100 mg tablet, Take 1 tablet (100 mg) by mouth once daily., Disp: 90 tablet, Rfl: 1    metFORMIN (Glucophage) 1,000 mg tablet, Take 1 tablet (1,000 mg) by mouth 2 times a day., Disp: 180 tablet, Rfl: 1    multivitamin tablet, Take by mouth., Disp: , Rfl:     pioglitazone (Actos) 15 mg tablet, Take 1 tablet (15 mg) by mouth once daily., Disp: 90 tablet, Rfl: 1    spironolactone (Aldactone) 25 mg tablet, Take 1 tablet (25 mg) by mouth once daily., Disp: 90 tablet, Rfl: 1    triamcinolone (Nasacort) 55 mcg nasal inhaler, Administer 1-2 sprays into each nostril once daily., Disp: , Rfl:      Review of system:  All other systems have been reviewed and are negative for complaints      Last recorded vitals:  There were no vitals taken for this visit.    Physical Exam:  General: Appears comfortable and in no apparent distress.  Head: Normocephalic, atraumatic  Eyes: Non-injected conjunctiva, sclera clear, no proptosis  Lungs: Breathing is easy, non-labored. Speaking in clear and complete sentences. Normal diaphragmatic movement.  Cardiovascular: no peripheral edema, cyanosis or pallor.   Abdomen: soft, non-distended, non-tender  : Bladder: non tender, not distended; scrotal wound with pink heathy tissue, minimal serous drainage, no foul smell noted.  MSK: Ambulatory with cane  Skin: No  visible rashes or lesions  Neurologic: Alert, oriented to person, place, and time  Psychiatric: mood and affect appropriate      Imaging  === 05/01/24 ===    US SCROTUM WITH DOPPLERS    - Impression -  No sign of intrauterine mass or torsion.    Large left hydrocele with some internal debris and internal  septations. Underlying infection can not be excluded.    3 mm left epididymal head cyst.      MACRO:  None    Signed by: Toño Eller 5/2/2024 6:19 PM  Dictation workstation:   [id number]    Labs  Office Visit on 03/14/2025   Component Date Value    POC Color, Urine 03/14/2025 Yellow     POC Appearance, Urine 03/14/2025 Clear     POC Glucose, Urine 03/14/2025 NEGATIVE     POC Bilirubin, Urine 03/14/2025 NEGATIVE     POC Ketones, Urine 03/14/2025 NEGATIVE     POC Specific Gravity, Ur* 03/14/2025 1.020     POC Blood, Urine 03/14/2025 NEGATIVE     POC PH, Urine 03/14/2025 6.0     POC Protein, Urine 03/14/2025 NEGATIVE     POC Urobilinogen, Urine 03/14/2025 0.2     Poc Nitrite, Urine 03/14/2025 NEGATIVE     POC Leukocytes, Urine 03/14/2025 NEGATIVE        Assessment and Plan:  Tomas Morataya is a 80 y.o. male with history of left epididymal head cyst, ED, bilateral hydrocele  s/p bilateral hydrocelectomy and a bilateral spermatic cord block on 3/14/23; left hydrocele s/p left hydrocelectomy on 2/24/24, who presents for follow up and wound care.    Plan:  -Scrotal wound packed with dry gauze bandage. Patient tolerated dressing change well  -Patient to use bacitracin on scrotal wound to help with healing  -Follow-up in 2 days for dressing change, or sooner if needed, to reassess symptoms and for medication refill.    All questions and concerns were addressed. Patient verbalizes understanding and has no other questions at this time.     Some elements copied from Dr. Red's note on 4/4/25, the elements have been updated and all reflect current decision making from today, 04/07/25    E&M visit today is associated  with current or anticipated ongoing medical care services related to a patient's single, serious condition or a complex condition.    YAMILEX Lao-CNP   [facility]  04/07/25 1:45 PM

## 2025-04-07 ENCOUNTER — APPOINTMENT (OUTPATIENT)
Dept: PRIMARY CARE | Facility: CLINIC | Age: 80
End: 2025-04-07
Payer: COMMERCIAL

## 2025-04-07 ENCOUNTER — OFFICE VISIT (OUTPATIENT)
Dept: UROLOGY | Facility: HOSPITAL | Age: 80
End: 2025-04-07
Payer: COMMERCIAL

## 2025-04-07 VITALS
BODY MASS INDEX: 40.19 KG/M2 | OXYGEN SATURATION: 97 % | DIASTOLIC BLOOD PRESSURE: 58 MMHG | WEIGHT: 249 LBS | TEMPERATURE: 98.4 F | HEART RATE: 100 BPM | SYSTOLIC BLOOD PRESSURE: 110 MMHG

## 2025-04-07 DIAGNOSIS — M54.50 LOW BACK PAIN WITHOUT SCIATICA, UNSPECIFIED BACK PAIN LATERALITY, UNSPECIFIED CHRONICITY: ICD-10-CM

## 2025-04-07 DIAGNOSIS — I25.10 CORONARY ARTERIOSCLEROSIS: ICD-10-CM

## 2025-04-07 DIAGNOSIS — S31.30XS: Primary | ICD-10-CM

## 2025-04-07 DIAGNOSIS — D51.9 ANEMIA DUE TO VITAMIN B12 DEFICIENCY, UNSPECIFIED B12 DEFICIENCY TYPE: Primary | ICD-10-CM

## 2025-04-07 DIAGNOSIS — E11.69 TYPE 2 DIABETES MELLITUS WITH OTHER SPECIFIED COMPLICATION, UNSPECIFIED WHETHER LONG TERM INSULIN USE (MULTI): ICD-10-CM

## 2025-04-07 DIAGNOSIS — I10 HYPERTENSION, UNSPECIFIED TYPE: ICD-10-CM

## 2025-04-07 DIAGNOSIS — Z98.890 HISTORY OF HYDROCELECTOMY: ICD-10-CM

## 2025-04-07 DIAGNOSIS — I10 ESSENTIAL HYPERTENSION: ICD-10-CM

## 2025-04-07 DIAGNOSIS — E78.5 HYPERLIPIDEMIA, UNSPECIFIED HYPERLIPIDEMIA TYPE: ICD-10-CM

## 2025-04-07 DIAGNOSIS — N40.0 BENIGN PROSTATIC HYPERPLASIA, UNSPECIFIED WHETHER LOWER URINARY TRACT SYMPTOMS PRESENT: ICD-10-CM

## 2025-04-07 PROCEDURE — G2211 COMPLEX E/M VISIT ADD ON: HCPCS | Performed by: NURSE PRACTITIONER

## 2025-04-07 PROCEDURE — 99213 OFFICE O/P EST LOW 20 MIN: CPT | Performed by: NURSE PRACTITIONER

## 2025-04-07 PROCEDURE — 1159F MED LIST DOCD IN RCRD: CPT | Performed by: NURSE PRACTITIONER

## 2025-04-07 PROCEDURE — 1036F TOBACCO NON-USER: CPT | Performed by: FAMILY MEDICINE

## 2025-04-07 PROCEDURE — 3078F DIAST BP <80 MM HG: CPT | Performed by: FAMILY MEDICINE

## 2025-04-07 PROCEDURE — 1159F MED LIST DOCD IN RCRD: CPT | Performed by: FAMILY MEDICINE

## 2025-04-07 PROCEDURE — 1160F RVW MEDS BY RX/DR IN RCRD: CPT | Performed by: NURSE PRACTITIONER

## 2025-04-07 PROCEDURE — 3074F SYST BP LT 130 MM HG: CPT | Performed by: FAMILY MEDICINE

## 2025-04-07 PROCEDURE — 99214 OFFICE O/P EST MOD 30 MIN: CPT | Performed by: FAMILY MEDICINE

## 2025-04-07 PROCEDURE — 1036F TOBACCO NON-USER: CPT | Performed by: NURSE PRACTITIONER

## 2025-04-07 RX ORDER — HYDROCHLOROTHIAZIDE 25 MG/1
25 TABLET ORAL DAILY
Qty: 90 TABLET | Refills: 1 | Status: SHIPPED | OUTPATIENT
Start: 2025-04-07 | End: 2025-10-04

## 2025-04-07 RX ORDER — ATORVASTATIN CALCIUM 40 MG/1
40 TABLET, FILM COATED ORAL DAILY
Qty: 90 TABLET | Refills: 1 | Status: SHIPPED | OUTPATIENT
Start: 2025-04-07 | End: 2025-10-04

## 2025-04-07 RX ORDER — METFORMIN HYDROCHLORIDE 1000 MG/1
1000 TABLET ORAL 2 TIMES DAILY
Qty: 180 TABLET | Refills: 1 | Status: SHIPPED | OUTPATIENT
Start: 2025-04-07 | End: 2025-10-04

## 2025-04-07 RX ORDER — DOXAZOSIN 4 MG/1
4 TABLET ORAL DAILY
Qty: 90 TABLET | Refills: 1 | Status: SHIPPED | OUTPATIENT
Start: 2025-04-07

## 2025-04-07 RX ORDER — AMLODIPINE BESYLATE 10 MG/1
10 TABLET ORAL DAILY
Qty: 90 TABLET | Refills: 1 | Status: SHIPPED | OUTPATIENT
Start: 2025-04-07 | End: 2025-10-04

## 2025-04-07 RX ORDER — SPIRONOLACTONE 25 MG/1
25 TABLET ORAL DAILY
Qty: 90 TABLET | Refills: 1 | Status: SHIPPED | OUTPATIENT
Start: 2025-04-07 | End: 2025-10-04

## 2025-04-07 RX ORDER — PIOGLITAZONEHYDROCHLORIDE 15 MG/1
15 TABLET ORAL DAILY
Qty: 90 TABLET | Refills: 1 | Status: SHIPPED | OUTPATIENT
Start: 2025-04-07 | End: 2025-10-04

## 2025-04-07 RX ORDER — LOSARTAN POTASSIUM 100 MG/1
100 TABLET ORAL DAILY
Qty: 90 TABLET | Refills: 1 | Status: SHIPPED | OUTPATIENT
Start: 2025-04-07 | End: 2025-10-04

## 2025-04-07 ASSESSMENT — ENCOUNTER SYMPTOMS
HYPERTENSION: 1
PALPITATIONS: 0
POLYDIPSIA: 0
DYSPHORIC MOOD: 0
VOMITING: 0
POLYPHAGIA: 0
SHORTNESS OF BREATH: 0
DIARRHEA: 0
CONSTIPATION: 0
DYSURIA: 0
SLEEP DISTURBANCE: 0
FATIGUE: 0
HEADACHES: 0
MYALGIAS: 0
DIZZINESS: 0
NAUSEA: 0
ABDOMINAL PAIN: 0

## 2025-04-07 NOTE — PROGRESS NOTES
Subjective   Patient ID: Tomas Morataya is a 80 y.o. male who presents for Hypertension (Recheck), Hyperlipidemia, and Diabetes and multiple issues.  Blood work from February.    Hypertension  Pertinent negatives include no chest pain, headaches, palpitations or shortness of breath.   Hyperlipidemia  Pertinent negatives include no chest pain, myalgias or shortness of breath.   Diabetes  Pertinent negatives for hypoglycemia include no dizziness or headaches. Pertinent negatives for diabetes include no chest pain, no fatigue, no polydipsia, no polyphagia and no polyuria.      Anemia: has been stable.  No unusual bleeding.  Last H&H was slightly low but postsurgery  HTN: on low end. No dizziness  DM: controlled. Last A1c 6.9 in feb. Not checking BS  BPH: Stable.  CAD: Stable.  Lipids: Has been stable  Anemia: Has been stable.  Low back pain: Recurrent and chronic but flaring recently.  No injury.  Having some weakness occasionally in legs.  MRI of back over 10 years ago showed arthritis.  No bowel or bladder changes    Review of Systems   Constitutional:  Negative for fatigue.   HENT: Negative.     Eyes:  Negative for visual disturbance.   Respiratory:  Negative for shortness of breath.    Cardiovascular:  Negative for chest pain and palpitations.   Gastrointestinal:  Negative for abdominal pain, constipation, diarrhea, nausea and vomiting.   Endocrine: Negative for cold intolerance, heat intolerance, polydipsia, polyphagia and polyuria.   Genitourinary:  Negative for dysuria.   Musculoskeletal:  Negative for myalgias.   Skin:  Negative for rash.   Neurological:  Negative for dizziness and headaches.   Psychiatric/Behavioral:  Negative for dysphoric mood and sleep disturbance.        Objective   /58   Pulse 100   Temp 36.9 °C (98.4 °F)   Wt 113 kg (249 lb)   SpO2 97%   BMI 40.19 kg/m²     Physical Exam  Vitals and nursing note reviewed.   Constitutional:       General: He is not in acute distress.      Appearance: Normal appearance. He is not toxic-appearing.   HENT:      Head: Normocephalic.      Mouth/Throat:      Pharynx: Oropharynx is clear.   Eyes:      General: No scleral icterus.     Pupils: Pupils are equal, round, and reactive to light.   Neck:      Vascular: No carotid bruit.   Cardiovascular:      Rate and Rhythm: Normal rate and regular rhythm.      Heart sounds: No murmur heard.  Pulmonary:      Effort: Pulmonary effort is normal. No respiratory distress.      Breath sounds: Normal breath sounds.   Abdominal:      Palpations: Abdomen is soft.      Tenderness: There is no abdominal tenderness. There is no guarding.   Musculoskeletal:         General: No tenderness.      Cervical back: Neck supple.      Right lower leg: Edema (Trace) present.      Left lower leg: Edema (Trace) present.      Comments: L-spine nontender to palpation   Lymphadenopathy:      Cervical: No cervical adenopathy.   Skin:     General: Skin is warm.   Neurological:      General: No focal deficit present.      Mental Status: He is alert.      Cranial Nerves: No cranial nerve deficit.   Psychiatric:         Mood and Affect: Mood normal.         Assessment/Plan   Problem List Items Addressed This Visit             ICD-10-CM    Hyperlipidemia E78.5    Relevant Medications    atorvastatin (Lipitor) 40 mg tablet    Other Relevant Orders    Comprehensive Metabolic Panel    Lipid Panel    Follow Up In Primary Care    Type 2 diabetes mellitus E11.9    Relevant Medications    pioglitazone (Actos) 15 mg tablet    metFORMIN (Glucophage) 1,000 mg tablet    Other Relevant Orders    Comprehensive Metabolic Panel    Hemoglobin A1C    Follow Up In Primary Care    Essential hypertension I10    Relevant Medications    losartan (Cozaar) 100 mg tablet    hydroCHLOROthiazide (HYDRODiuril) 25 mg tablet    atorvastatin (Lipitor) 40 mg tablet    Other Relevant Orders    Comprehensive Metabolic Panel    Follow Up In Primary Care    Anemia - Primary D64.9     Relevant Orders    CBC and Auto Differential    Vitamin B12    Follow Up In Primary Care    Coronary arteriosclerosis I25.10    Relevant Medications    atorvastatin (Lipitor) 40 mg tablet    amLODIPine (Norvasc) 10 mg tablet    Other Relevant Orders    Comprehensive Metabolic Panel    Follow Up In Primary Care     Other Visit Diagnoses         Codes    Hypertension, unspecified type     I10    Relevant Medications    spironolactone (Aldactone) 25 mg tablet    amLODIPine (Norvasc) 10 mg tablet    Other Relevant Orders    Comprehensive Metabolic Panel    Follow Up In Primary Care    Benign prostatic hyperplasia, unspecified whether lower urinary tract symptoms present     N40.0    Relevant Medications    doxazosin (Cardura) 4 mg tablet    Other Relevant Orders    Comprehensive Metabolic Panel    Follow Up In Primary Care    Low back pain without sciatica, unspecified back pain laterality, unspecified chronicity     M54.50    Relevant Orders    XR lumbar spine 2-3 views             Reviewed blood work.  Recommendations given.  Patient to discuss activity restrictions with urology before considering PT for low back pain (patient has open scrotal wound from recent surgery)

## 2025-04-07 NOTE — PATIENT INSTRUCTIONS
Recommend a predominant low fat whole foods plant based diet.  Cut back on meat, dairy, processed carbs, salt and oils(especially palm and coconut). Increase fiber in your diet.  Decrease alcohol as much as possible if you drink. Recommend regular exercise most days of the week(goal up to 150min per week). Also recommend good sleep habits aiming for 7-8 hours per night.     You were referred for xrays    Follow up with your specialists as scheduled    Continue your current meds    Keep track of your home blood pressures.  Log your blood pressure for 10 days. Goal is <130/80.  Notify the office if your blood pressure is consistently high or low for possible medication adjustment.     Return in 6 months for recheck and wellness visit, sooner if needed

## 2025-04-08 NOTE — PROGRESS NOTES
[facility]  Outpatient Clinic Note    Patient Name:  Tomas Morataya  MRN:  [id number]  :  1945  Date of Service: 2025     Visit type: Follow up visit    HPI    Interval History:  Tomas Morataya is a 80 y.o. male who is being seen today for  problems listed below.     Problem list/Chief complaints:  Bilateral hydrocele 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; s/p left hydrocelectomy 25  ED - on Viagra  Left epididymal head cyst      3/14/25: Visit with Dr. Red. Tomas Morataya is a 80 y.o. male who is being seen today for POV s/p left hydrocelectomy 25  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   -ED on Viagra  Healing well. Had some drainage but now improving. He is wearing a diaper for the leaking. On exam, it appears that the drainage is not coming from the incision site but instead from the back of the scrotum on the skin below the incision. I debrided some of the necrotic tissue and drained a large amount of fluid from the wound. Packed the area and applied gauze to the wound. Will come back on Monday and see Meghna to change the gauze. Will teach his wife to do the packing. Will see me back in 2 weeks.     3/17/25: Patient presents for follow up and dressing change. He is accompanied by his wife. He reports drainage from wound and is wearing pad to keep cloths dry. He is taking antibiotics and will complete the course tomorrow. He denies fever or chills.    3/19/25: Patient presents for follow up and scrotal wound packing. He is taking antibiotics as prescribed. He denies any urinary issues, no fever or chills.    3/21/25: Patient presents for follow up and wound packing. He noticed small amount of blood from his wound yesterday but it has since cleared up. He denies having any fever or chills.    3/24/25: Patient presents for follow up and wound packing. He reports some bleeding from wound site likely due  to sitting on hard surface, but bleeding has since resolved. He denies any other issues, no fever or chills.    3/26/25: Patient presents for follow up and wound care. He reports minimal bleeding from wound, no pain, no burning sensation, no fever or chills. Wound packing not present and might have fallen off earlier. Dr. House joined visit to assess patient's wound and states it's healing well.     3/26/25: Patient presents for follow up and dressing change. Wound packing not present on assessment and must have fallen out. Wound packed with no issues, patient tolerated well.    3/31/25: Patient presents for follow up and dressing change. He states packing fell out while he was using the bathroom yesterday, he noted small amount of bloody drainage that resolved. He denies fever or chills. Wound packed with no issues, patient tolerated well, advised to apply bacitracin to wound.    4/2/25: Patient presents for follow up and wound dressing change. He states he's doing fine, no fever or chills.    4/4/25: Visit with Dr. Red. Patient is s/p left hydrocelectomy on 2/24/25. Healing well. Had some drainage from the back of the scrotum on the skin below the incision which is now improving. At the last visit, we had debrided some of the necrotic tissue and drained a large amount of fluid from the wound. He has been following with Meghna to change dressings. Today again we packed the upper portion and applied gauze to the wound. Will come back on Monday next week and see Meghna to change the gauze.     4/7/25: Patient presents for follow up and dressing change. Patient reports drainage from wound and states he removed the dressing yesterday in the shower. He denies pain, no fever or chills.    4/9/25: Patient presents for dressing change. Patient states wound packing fell out  yesterday. He denies pain, no fever or chills.    Past Medical History:   Diagnosis Date    Allergy to other foods     History of food allergy    BPH  (benign prostatic hyperplasia)     Essential (primary) hypertension     Benign essential hypertension    Hyperlipidemia     Personal history of other endocrine, nutritional and metabolic disease     History of hyperlipidemia    Personal history of other endocrine, nutritional and metabolic disease     History of diabetes mellitus    Type 2 diabetes mellitus        Past Surgical History:   Procedure Laterality Date    OTHER SURGICAL HISTORY  08/29/2019    Cyst excision    OTHER SURGICAL HISTORY  08/29/2019    Heart surgery       Social History     Socioeconomic History    Marital status:      Spouse name: Not on file    Number of children: Not on file    Years of education: Not on file    Highest education level: Not on file   Occupational History    Not on file   Tobacco Use    Smoking status: Never    Smokeless tobacco: Never   Vaping Use    Vaping status: Never Used   Substance and Sexual Activity    Alcohol use: Yes     Alcohol/week: 1.0 standard drink of alcohol     Types: 1 Glasses of wine per week    Drug use: Never    Sexual activity: Defer   Other Topics Concern    Not on file   Social History Narrative    Not on file     Social Drivers of Health     Financial Resource Strain: Not on file   Food Insecurity: Not on file   Transportation Needs: Not on file   Physical Activity: Not on file   Stress: Not on file   Social Connections: Not on file   Intimate Partner Violence: Not on file   Housing Stability: Not on file       Allergies   Allergen Reactions    Cefaclor Anaphylaxis and Swelling    Penicillins Swelling and Rash     FACIAL SWELLING          Current Outpatient Medications:     acetaminophen (Tylenol) 500 mg tablet, Take 650 mg by mouth every 6 hours if needed for mild pain (1 - 3)., Disp: , Rfl:     amLODIPine (Norvasc) 10 mg tablet, Take 1 tablet (10 mg) by mouth once daily., Disp: 90 tablet, Rfl: 1    aspirin 81 mg EC tablet, Take 1 tablet (81 mg) by mouth once daily., Disp: , Rfl:      atorvastatin (Lipitor) 40 mg tablet, Take 1 tablet (40 mg) by mouth once daily., Disp: 90 tablet, Rfl: 1    cyanocobalamin, vitamin B-12, (Vitamin B-12) 1,000 mcg tablet extended release, Take 1 tablet (1,000 mcg) by mouth once daily., Disp: , Rfl:     doxazosin (Cardura) 4 mg tablet, Take 1 tablet (4 mg) by mouth once daily., Disp: 90 tablet, Rfl: 1    folic acid (Folvite) 1 mg tablet, Take 1 tablet (1 mg) by mouth once daily., Disp: , Rfl:     hydroCHLOROthiazide (HYDRODiuril) 25 mg tablet, Take 1 tablet (25 mg) by mouth once daily., Disp: 90 tablet, Rfl: 1    ketoconazole (NIZOral) 2 % cream, Apply twice daily to affected areas of face, Disp: 60 g, Rfl: 11    loratadine (Claritin) 10 mg tablet, Take 1 tablet (10 mg) by mouth once daily., Disp: , Rfl:     losartan (Cozaar) 100 mg tablet, Take 1 tablet (100 mg) by mouth once daily., Disp: 90 tablet, Rfl: 1    metFORMIN (Glucophage) 1,000 mg tablet, Take 1 tablet (1,000 mg) by mouth 2 times a day., Disp: 180 tablet, Rfl: 1    multivitamin tablet, Take by mouth., Disp: , Rfl:     pioglitazone (Actos) 15 mg tablet, Take 1 tablet (15 mg) by mouth once daily., Disp: 90 tablet, Rfl: 1    spironolactone (Aldactone) 25 mg tablet, Take 1 tablet (25 mg) by mouth once daily., Disp: 90 tablet, Rfl: 1    triamcinolone (Nasacort) 55 mcg nasal inhaler, Administer 1-2 sprays into each nostril once daily., Disp: , Rfl:      Review of system:  All other systems have been reviewed and are negative for complaints      Last recorded vitals:  There were no vitals taken for this visit.    Physical Exam:  General: Appears comfortable and in no apparent distress.  Head: Normocephalic, atraumatic  Eyes: Non-injected conjunctiva, sclera clear, no proptosis  Lungs: Breathing is easy, non-labored. Speaking in clear and complete sentences. Normal diaphragmatic movement.  Cardiovascular: no peripheral edema, cyanosis or pallor.   Abdomen: soft, non-distended, non-tender  : Bladder: non tender,  not distended; scrotal wound with pink heathy tissue, minimal serous drainage, no foul smell noted.  MSK: Ambulatory with cane  Skin: No visible rashes or lesions  Neurologic: Alert, oriented to person, place, and time  Psychiatric: mood and affect appropriate      Imaging  === 05/01/24 ===    US SCROTUM WITH DOPPLERS    - Impression -  No sign of intrauterine mass or torsion.    Large left hydrocele with some internal debris and internal  septations. Underlying infection can not be excluded.    3 mm left epididymal head cyst.      MACRO:  None    Signed by: Toño Eller 5/2/2024 6:19 PM  Dictation workstation:   [id number]    Labs  Office Visit on 03/14/2025   Component Date Value    POC Color, Urine 03/14/2025 Yellow     POC Appearance, Urine 03/14/2025 Clear     POC Glucose, Urine 03/14/2025 NEGATIVE     POC Bilirubin, Urine 03/14/2025 NEGATIVE     POC Ketones, Urine 03/14/2025 NEGATIVE     POC Specific Gravity, Ur* 03/14/2025 1.020     POC Blood, Urine 03/14/2025 NEGATIVE     POC PH, Urine 03/14/2025 6.0     POC Protein, Urine 03/14/2025 NEGATIVE     POC Urobilinogen, Urine 03/14/2025 0.2     Poc Nitrite, Urine 03/14/2025 NEGATIVE     POC Leukocytes, Urine 03/14/2025 NEGATIVE        Assessment and Plan:  Tomas Morataya is a 80 y.o. male with history of left epididymal head cyst, ED, bilateral hydrocele  s/p bilateral hydrocelectomy and a bilateral spermatic cord block on 3/14/23; left hydrocele s/p left hydrocelectomy on 2/24/24, who presents for follow up and wound care.    Plan:  -Scrotal wound packed with dry gauze bandage. Patient tolerated dressing change well  -Patient to use bacitracin on scrotal wound to help with healing  -Follow-up in 2 days for dressing change, or sooner if needed, to reassess symptoms and for medication refill.    All questions and concerns were addressed. Patient verbalizes understanding and has no other questions at this time.     Some elements copied from my note on  4/7/25, the elements have been updated and all reflect current decision making from today, 04/09/25    E&M visit today is associated with current or anticipated ongoing medical care services related to a patient's single, serious condition or a complex condition.    Meghna Reyes, YAMILEX-CNP   [facility]  04/09/25 2:39 PM

## 2025-04-09 ENCOUNTER — OFFICE VISIT (OUTPATIENT)
Dept: UROLOGY | Facility: HOSPITAL | Age: 80
End: 2025-04-09
Payer: COMMERCIAL

## 2025-04-09 DIAGNOSIS — S31.30XS: Primary | ICD-10-CM

## 2025-04-09 DIAGNOSIS — Z98.890 HISTORY OF HYDROCELECTOMY: ICD-10-CM

## 2025-04-09 PROCEDURE — 1159F MED LIST DOCD IN RCRD: CPT | Performed by: NURSE PRACTITIONER

## 2025-04-09 PROCEDURE — 99213 OFFICE O/P EST LOW 20 MIN: CPT | Performed by: NURSE PRACTITIONER

## 2025-04-09 PROCEDURE — G2211 COMPLEX E/M VISIT ADD ON: HCPCS | Performed by: NURSE PRACTITIONER

## 2025-04-09 PROCEDURE — 1160F RVW MEDS BY RX/DR IN RCRD: CPT | Performed by: NURSE PRACTITIONER

## 2025-04-09 PROCEDURE — 1036F TOBACCO NON-USER: CPT | Performed by: NURSE PRACTITIONER

## 2025-04-11 ENCOUNTER — OFFICE VISIT (OUTPATIENT)
Dept: UROLOGY | Facility: HOSPITAL | Age: 80
End: 2025-04-11
Payer: COMMERCIAL

## 2025-04-11 ENCOUNTER — APPOINTMENT (OUTPATIENT)
Dept: UROLOGY | Facility: HOSPITAL | Age: 80
End: 2025-04-11
Payer: COMMERCIAL

## 2025-04-11 DIAGNOSIS — S31.30XS: Primary | ICD-10-CM

## 2025-04-11 PROCEDURE — 1036F TOBACCO NON-USER: CPT | Performed by: UROLOGY

## 2025-04-11 PROCEDURE — 99024 POSTOP FOLLOW-UP VISIT: CPT | Performed by: UROLOGY

## 2025-04-11 PROCEDURE — 99211 OFF/OP EST MAY X REQ PHY/QHP: CPT | Performed by: UROLOGY

## 2025-04-11 PROCEDURE — 1159F MED LIST DOCD IN RCRD: CPT | Performed by: UROLOGY

## 2025-04-11 NOTE — PROGRESS NOTES
[facility]    Last visit - 3/14/25     HISTORY OF PRESENT ILLNESS:   Tomas Morataya is a 80 y.o. male who is being seen today for wound packing s/p left hydrocelectomy 2/24/25  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   -ED on Viagra  PAST MEDICAL HISTORY:  Past Medical History:   Diagnosis Date    Allergy to other foods     History of food allergy    BPH (benign prostatic hyperplasia)     Essential (primary) hypertension     Benign essential hypertension    Hyperlipidemia     Personal history of other endocrine, nutritional and metabolic disease     History of hyperlipidemia    Personal history of other endocrine, nutritional and metabolic disease     History of diabetes mellitus    Type 2 diabetes mellitus        PAST SURGICAL HISTORY:  Past Surgical History:   Procedure Laterality Date    OTHER SURGICAL HISTORY  08/29/2019    Cyst excision    OTHER SURGICAL HISTORY  08/29/2019    Heart surgery        ALLERGIES:   Allergies   Allergen Reactions    Cefaclor Anaphylaxis and Swelling    Penicillins Swelling and Rash     FACIAL SWELLING        MEDICATIONS:   Current Outpatient Medications   Medication Instructions    acetaminophen (TYLENOL) 650 mg, Every 6 hours PRN    amLODIPine (NORVASC) 10 mg, oral, Daily    aspirin 81 mg EC tablet 1 tablet, Daily    atorvastatin (LIPITOR) 40 mg, oral, Daily    cyanocobalamin, vitamin B-12, (Vitamin B-12) 1,000 mcg tablet extended release 1 tablet, Daily    doxazosin (CARDURA) 4 mg, oral, Daily    folic acid (Folvite) 1 mg tablet 1 tablet, Daily    hydroCHLOROthiazide (HYDRODIURIL) 25 mg, oral, Daily    ketoconazole (NIZOral) 2 % cream Apply twice daily to affected areas of face    loratadine (Claritin) 10 mg tablet 1 tablet, Daily    losartan (COZAAR) 100 mg, oral, Daily    metFORMIN (GLUCOPHAGE) 1,000 mg, oral, 2 times daily    multivitamin tablet Take by mouth.    pioglitazone (ACTOS) 15 mg, oral, Daily    spironolactone (ALDACTONE) 25 mg, oral,  "Daily    triamcinolone (Nasacort) 55 mcg nasal inhaler 1-2 sprays, Daily        PHYSICAL EXAM:  There were no vitals taken for this visit.  Constitutional: Patient appears well-developed and well-nourished. No distress.    Pulmonary/Chest: Effort normal. No respiratory distress.   Abdominal: Soft, ND NT  : fullness of the right testicle  Musculoskeletal: Normal range of motion.    Neurological: Alert and oriented to person, place, and time.  Psychiatric: Normal mood and affect. Behavior is normal. Thought content normal.      Labs:  No results found for: \"TESTOSTERONE\"  Lab Results   Component Value Date    PSA 1.73 10/04/2024     No components found for: \"CBC\"  Lab Results   Component Value Date    CREATININE 0.72 03/08/2025     No components found for: \"TESTOTMS\"  No results found for: \"TESTF\"    Imaging: Scrotal US on 5/1/24 demonstrated No sign of intrauterine mass or torsion.  Large left hydrocele with some internal debris and internal septations. Underlying infection can not be excluded.  3 mm left epididymal head cyst.    Discussion:  Patient is s/p left hydrocelectomy on 2/24/25. Healing well. Had some drainage from the back of the scrotum on the skin below the incision which is now progressively improving. Wound looks dry, clean and intact, well healing. At the last visit, we had debrided some of the necrotic tissue and drained a large amount of fluid from the wound. He has been following with Meghna to change dressings. Today again we packed the upper portion and applied gauze to the wound. Will come back next week to change dressing. Notes he is travelling-advised to apply gauze to wound. Will see us when he gets back from his trip.       Assessment:      No diagnosis found.      Tomas Morataya is a 80 y.o. male here for [facility]UV     Plan:   1)fu with Meghna next week to change dressing.  2) fu with me when he gets back from his trip  All questions and concerns were addressed. Patient verbalizes understanding " and has no other questions at this time.     Scribe Attestation  By signing my name below, I, Diony Corcoran   attest that this documentation has been prepared under the direction and in the presence of Marc Red MD.

## 2025-04-14 NOTE — PROGRESS NOTES
Subjective   Patient ID: Tomas Morataya is a 80 y.o. male    HPI  80 y.o. male who presents for a follow-up visit  s/p bilateral hydrocelectomy and a bilateral spermatic cord block on 3/14/23; s/p left hydrocelectomy 2/24/25. Dr. Red noted on 03/14/2025 that the patient is healing well. Had some drainage that is improving. Wound is healing well upon physical exam today.    The most recent Scrotal US, conducted on 05/01/2024, revealed:  No sign of intrauterine mass or torsion.      Large left hydrocele with some internal debris and internal  septations. Underlying infection can not be excluded.      3 mm left epididymal head cyst.       Review of Systems    All systems were reviewed. Anything negative was noted in the HPI.    Objective   Physical Exam    General: Well developed, well nourished, alert and cooperative, appears in no acute distress   Eyes: Non-injected conjunctiva, sclera clear, no proptosis   Cardiac: Extremities are warm and well perfused. No edema, cyanosis or pallor   Lungs: Breathing is easy, non-labored. Speaking in clear and complete sentences. Normal diaphragmatic movement   MSK: Ambulatory with steady gait, unassisted   Neuro: Alert and oriented to person, place, and time   Psych: Demonstrates good judgment and reason, without hallucinations, abnormal affect or abnormal behaviors   Skin: No obvious lesions, no rashes       No CVA tenderness bilaterally   No suprapubic pain or discomfort       Past Medical History:   Diagnosis Date    Allergy to other foods     History of food allergy    BPH (benign prostatic hyperplasia)     Essential (primary) hypertension     Benign essential hypertension    Hyperlipidemia     Personal history of other endocrine, nutritional and metabolic disease     History of hyperlipidemia    Personal history of other endocrine, nutritional and metabolic disease     History of diabetes mellitus    Type 2 diabetes mellitus          Past Surgical History:   Procedure  Laterality Date    OTHER SURGICAL HISTORY  08/29/2019    Cyst excision    OTHER SURGICAL HISTORY  08/29/2019    Heart surgery       Assessment/Plan   S/P bilateral hydrocelectomy and a bilateral spermatic cord block    80 y.o. male who presents for the above condition, We had a very long and extensive discussion with the patient regarding the pathophysiology, differential diagnosis, risk factor, management, natural history, incidence and diagnostic work-up of the condition.        - Advised pt to continue to change gauze.  - Advised pt to stop packing wound and apply antibiotic cream twice daily.    Plan:  - Follow up in 1 week with NP Meghna Reyes        E&M visit today is associated with current or anticipated ongoing medical care services related to a patient's single, serious condition or a complex condition.     4/15/2025    Scribe Attestation  By signing my name below, IAmee Scribe attest that this documentation has been prepared under the direction and in the presence of Dr. Jerrod House.

## 2025-04-15 ENCOUNTER — OFFICE VISIT (OUTPATIENT)
Dept: UROLOGY | Facility: HOSPITAL | Age: 80
End: 2025-04-15
Payer: COMMERCIAL

## 2025-04-15 DIAGNOSIS — S31.30XS: Primary | ICD-10-CM

## 2025-04-15 PROCEDURE — 1159F MED LIST DOCD IN RCRD: CPT | Performed by: STUDENT IN AN ORGANIZED HEALTH CARE EDUCATION/TRAINING PROGRAM

## 2025-04-15 PROCEDURE — 99024 POSTOP FOLLOW-UP VISIT: CPT | Performed by: STUDENT IN AN ORGANIZED HEALTH CARE EDUCATION/TRAINING PROGRAM

## 2025-04-15 PROCEDURE — 99211 OFF/OP EST MAY X REQ PHY/QHP: CPT | Performed by: STUDENT IN AN ORGANIZED HEALTH CARE EDUCATION/TRAINING PROGRAM

## 2025-04-17 ENCOUNTER — APPOINTMENT (OUTPATIENT)
Dept: UROLOGY | Facility: HOSPITAL | Age: 80
End: 2025-04-17
Payer: COMMERCIAL

## 2025-04-24 NOTE — PROGRESS NOTES
[facility]  Outpatient Clinic Note    Patient Name:  Tomas Morataya  MRN:  [id number]  :  1945  Date of Service: 2025     Visit type: Follow up visit    HPI    Interval History:  Tomas Morataya is a 80 y.o. male who is being seen today for  problems listed below.     Problem list/Chief complaints:  Bilateral hydrocele 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; s/p left hydrocelectomy 25  ED - on Viagra  Left epididymal head cyst      25: Visit with Dr. Red. Tomas Morataya is a 80 y.o. male who is being seen today for wound packing s/p left hydrocelectomy 25.   Patient is s/p left hydrocelectomy on 25. Healing well. Had some drainage from the back of the scrotum on the skin below the incision which is now progressively improving. Wound looks dry, clean and intact, well healing. At the last visit, we had debrided some of the necrotic tissue and drained a large amount of fluid from the wound. He has been following with Meghna to change dressings. Today again we packed the upper portion and applied gauze to the wound. Will come back next week to change dressing. Notes he is travelling-advised to apply gauze to wound. Will see us when he gets back from his trip.     4/15/25: Visit with Dr. House. 80 y.o. male who presents for a follow-up visit  s/p bilateral hydrocelectomy and a bilateral spermatic cord block on 3/14/23; s/p left hydrocelectomy 25. Dr. Red noted on 2025 that the patient is healing well. Had some drainage that is improving. Wound is healing well upon physical exam today.  - Advised pt to continue to change gauze.  - Advised pt to stop packing wound and apply antibiotic cream twice daily.     Medical History[1]    Surgical History[2]    Social History     Socioeconomic History    Marital status:      Spouse name: Not on file    Number of children: Not on file    Years of education: Not on file    Highest  education level: Not on file   Occupational History    Not on file   Tobacco Use    Smoking status: Never    Smokeless tobacco: Never   Vaping Use    Vaping status: Never Used   Substance and Sexual Activity    Alcohol use: Yes     Alcohol/week: 1.0 standard drink of alcohol     Types: 1 Glasses of wine per week    Drug use: Never    Sexual activity: Defer   Other Topics Concern    Not on file   Social History Narrative    Not on file     Social Drivers of Health     Financial Resource Strain: Not on file   Food Insecurity: Not on file   Transportation Needs: Not on file   Physical Activity: Not on file   Stress: Not on file   Social Connections: Not on file   Intimate Partner Violence: Not on file   Housing Stability: Not on file       Allergies[3]     Current Medications[4]     Review of system:  All other systems have been reviewed and are negative for complaints      Last recorded vitals:  There were no vitals taken for this visit.    Physical Exam:  ***    Imaging  === 05/01/24 ===    US SCROTUM WITH DOPPLERS    - Impression -  No sign of intrauterine mass or torsion.    Large left hydrocele with some internal debris and internal  septations. Underlying infection can not be excluded.    3 mm left epididymal head cyst.      MACRO:  None    Signed by: Toño Eller 5/2/2024 6:19 PM  Dictation workstation:   [id number]    Labs  Office Visit on 03/14/2025   Component Date Value    POC Color, Urine 03/14/2025 Yellow     POC Appearance, Urine 03/14/2025 Clear     POC Glucose, Urine 03/14/2025 NEGATIVE     POC Bilirubin, Urine 03/14/2025 NEGATIVE     POC Ketones, Urine 03/14/2025 NEGATIVE     POC Specific Gravity, Ur* 03/14/2025 1.020     POC Blood, Urine 03/14/2025 NEGATIVE     POC PH, Urine 03/14/2025 6.0     POC Protein, Urine 03/14/2025 NEGATIVE     POC Urobilinogen, Urine 03/14/2025 0.2     Poc Nitrite, Urine 03/14/2025 NEGATIVE     POC Leukocytes, Urine 03/14/2025 NEGATIVE        Assessment and  Plan:  Tomas Morataya is a 80 y.o. male with history of left epididymal head cyst, ED, bilateral hydrocele s/p bilateral hydrocelectomy and a bilateral spermatic cord block on 3/14/23; s/p left hydrocelectomy 2/24/25, who presents for follow up .     Plan:      Follow-up ***, or sooner if needed, to reassess symptoms and for medication refill.    All questions and concerns were addressed. Patient verbalizes understanding and has no other questions at this time.     Some elements copied from Dr. House's note on 4/15/25, the elements have been updated and all reflect current decision making from today, 04/24/25     YAMILEX Lao-CNP   [facility]  04/24/25 7:08 PM       [1]   Past Medical History:  Diagnosis Date    Allergy to other foods     History of food allergy    BPH (benign prostatic hyperplasia)     Essential (primary) hypertension     Benign essential hypertension    Hyperlipidemia     Personal history of other endocrine, nutritional and metabolic disease     History of hyperlipidemia    Personal history of other endocrine, nutritional and metabolic disease     History of diabetes mellitus    Type 2 diabetes mellitus    [2]   Past Surgical History:  Procedure Laterality Date    OTHER SURGICAL HISTORY  08/29/2019    Cyst excision    OTHER SURGICAL HISTORY  08/29/2019    Heart surgery   [3]   Allergies  Allergen Reactions    Cefaclor Anaphylaxis and Swelling    Penicillins Swelling and Rash     FACIAL SWELLING   [4]   Current Outpatient Medications:     acetaminophen (Tylenol) 500 mg tablet, Take 650 mg by mouth every 6 hours if needed for mild pain (1 - 3)., Disp: , Rfl:     amLODIPine (Norvasc) 10 mg tablet, Take 1 tablet (10 mg) by mouth once daily., Disp: 90 tablet, Rfl: 1    aspirin 81 mg EC tablet, Take 1 tablet (81 mg) by mouth once daily., Disp: , Rfl:     atorvastatin (Lipitor) 40 mg tablet, Take 1 tablet (40 mg) by mouth once daily., Disp: 90 tablet, Rfl: 1    cyanocobalamin, vitamin  B-12, (Vitamin B-12) 1,000 mcg tablet extended release, Take 1 tablet (1,000 mcg) by mouth once daily., Disp: , Rfl:     doxazosin (Cardura) 4 mg tablet, Take 1 tablet (4 mg) by mouth once daily., Disp: 90 tablet, Rfl: 1    folic acid (Folvite) 1 mg tablet, Take 1 tablet (1 mg) by mouth once daily., Disp: , Rfl:     hydroCHLOROthiazide (HYDRODiuril) 25 mg tablet, Take 1 tablet (25 mg) by mouth once daily., Disp: 90 tablet, Rfl: 1    ketoconazole (NIZOral) 2 % cream, Apply twice daily to affected areas of face, Disp: 60 g, Rfl: 11    loratadine (Claritin) 10 mg tablet, Take 1 tablet (10 mg) by mouth once daily., Disp: , Rfl:     losartan (Cozaar) 100 mg tablet, Take 1 tablet (100 mg) by mouth once daily., Disp: 90 tablet, Rfl: 1    metFORMIN (Glucophage) 1,000 mg tablet, Take 1 tablet (1,000 mg) by mouth 2 times a day., Disp: 180 tablet, Rfl: 1    multivitamin tablet, Take by mouth., Disp: , Rfl:     pioglitazone (Actos) 15 mg tablet, Take 1 tablet (15 mg) by mouth once daily., Disp: 90 tablet, Rfl: 1    spironolactone (Aldactone) 25 mg tablet, Take 1 tablet (25 mg) by mouth once daily., Disp: 90 tablet, Rfl: 1    triamcinolone (Nasacort) 55 mcg nasal inhaler, Administer 1-2 sprays into each nostril once daily., Disp: , Rfl:

## 2025-04-25 ENCOUNTER — APPOINTMENT (OUTPATIENT)
Dept: UROLOGY | Facility: HOSPITAL | Age: 80
End: 2025-04-25
Payer: COMMERCIAL

## 2025-04-28 ENCOUNTER — APPOINTMENT (OUTPATIENT)
Dept: UROLOGY | Facility: HOSPITAL | Age: 80
End: 2025-04-28
Payer: COMMERCIAL

## 2025-04-30 ENCOUNTER — APPOINTMENT (OUTPATIENT)
Dept: UROLOGY | Facility: HOSPITAL | Age: 80
End: 2025-04-30
Payer: COMMERCIAL

## 2025-05-01 NOTE — PROGRESS NOTES
[facility]UV    Last visit - 4/11/25     HISTORY OF PRESENT ILLNESS:   Tomas Morataya is a 80 y.o. male who is being seen today for wound check s/p left hydrocelectomy 2/24/25  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   -ED on Viagra  PAST MEDICAL HISTORY:  Past Medical History:   Diagnosis Date    Allergy to other foods     History of food allergy    BPH (benign prostatic hyperplasia)     Essential (primary) hypertension     Benign essential hypertension    Hyperlipidemia     Personal history of other endocrine, nutritional and metabolic disease     History of hyperlipidemia    Personal history of other endocrine, nutritional and metabolic disease     History of diabetes mellitus    Type 2 diabetes mellitus        PAST SURGICAL HISTORY:  Past Surgical History:   Procedure Laterality Date    OTHER SURGICAL HISTORY  08/29/2019    Cyst excision    OTHER SURGICAL HISTORY  08/29/2019    Heart surgery        ALLERGIES:   Allergies   Allergen Reactions    Cefaclor Anaphylaxis and Swelling    Penicillins Swelling and Rash     FACIAL SWELLING        MEDICATIONS:   Current Outpatient Medications   Medication Instructions    acetaminophen (TYLENOL) 650 mg, Every 6 hours PRN    amLODIPine (NORVASC) 10 mg, oral, Daily    aspirin 81 mg EC tablet 1 tablet, Daily    atorvastatin (LIPITOR) 40 mg, oral, Daily    cyanocobalamin, vitamin B-12, (Vitamin B-12) 1,000 mcg tablet extended release 1 tablet, Daily    doxazosin (CARDURA) 4 mg, oral, Daily    folic acid (Folvite) 1 mg tablet 1 tablet, Daily    hydroCHLOROthiazide (HYDRODIURIL) 25 mg, oral, Daily    ketoconazole (NIZOral) 2 % cream Apply twice daily to affected areas of face    loratadine (Claritin) 10 mg tablet 1 tablet, Daily    losartan (COZAAR) 100 mg, oral, Daily    metFORMIN (GLUCOPHAGE) 1,000 mg, oral, 2 times daily    multivitamin tablet Take by mouth.    pioglitazone (ACTOS) 15 mg, oral, Daily    spironolactone (ALDACTONE) 25 mg, oral,  "Daily    triamcinolone (Nasacort) 55 mcg nasal inhaler 1-2 sprays, Daily        PHYSICAL EXAM:  There were no vitals taken for this visit.  Constitutional: Patient appears well-developed and well-nourished. No distress.    Pulmonary/Chest: Effort normal. No respiratory distress.   Abdominal: Soft, ND NT  : fullness of the right testicle  Musculoskeletal: Normal range of motion.    Neurological: Alert and oriented to person, place, and time.  Psychiatric: Normal mood and affect. Behavior is normal. Thought content normal.      Labs:  No results found for: \"TESTOSTERONE\"  Lab Results   Component Value Date    PSA 1.73 10/04/2024     No components found for: \"CBC\"  Lab Results   Component Value Date    CREATININE 0.72 03/08/2025     No components found for: \"TESTOTMS\"  No results found for: \"TESTF\"    Imaging: Scrotal US on 5/1/24 demonstrated No sign of intrauterine mass or torsion.  Large left hydrocele with some internal debris and internal septations. Underlying infection can not be excluded.  3 mm left epididymal head cyst.    Discussion:  Patient is s/p left hydrocelectomy on 2/24/25. Had some drainage from the back of the scrotum on the skin below the incision which is now progressively improving. Healing well. Wound was checked, cauterized some of the granulation tissue. No packing necessary, gauze placed on top of it. Will fu in 2 weeks for wound check.     Assessment:      No diagnosis found.      Tomas Morataya is a 80 y.o. male here for FUV     Plan:   1) fu in 2 weeks for wound check  All questions and concerns were addressed. Patient verbalizes understanding and has no other questions at this time.     Scribe Attestation  By signing my name below, I, Marc Red MD , Scribe   attest that this documentation has been prepared under the direction and in the presence of Marc Red MD.    "

## 2025-05-02 ENCOUNTER — HOSPITAL ENCOUNTER (OUTPATIENT)
Dept: RADIOLOGY | Facility: CLINIC | Age: 80
Discharge: HOME | End: 2025-05-02
Payer: COMMERCIAL

## 2025-05-02 ENCOUNTER — OFFICE VISIT (OUTPATIENT)
Dept: UROLOGY | Facility: HOSPITAL | Age: 80
End: 2025-05-02
Payer: COMMERCIAL

## 2025-05-02 DIAGNOSIS — M54.50 LOW BACK PAIN WITHOUT SCIATICA, UNSPECIFIED BACK PAIN LATERALITY, UNSPECIFIED CHRONICITY: ICD-10-CM

## 2025-05-02 DIAGNOSIS — E66.01 MORBID OBESITY (MULTI): Primary | ICD-10-CM

## 2025-05-02 DIAGNOSIS — N43.2 OTHER HYDROCELE: ICD-10-CM

## 2025-05-02 PROCEDURE — 72100 X-RAY EXAM L-S SPINE 2/3 VWS: CPT

## 2025-05-02 PROCEDURE — 99211 OFF/OP EST MAY X REQ PHY/QHP: CPT | Performed by: UROLOGY

## 2025-05-02 PROCEDURE — 1159F MED LIST DOCD IN RCRD: CPT | Performed by: UROLOGY

## 2025-05-09 ENCOUNTER — APPOINTMENT (OUTPATIENT)
Dept: UROLOGY | Facility: HOSPITAL | Age: 80
End: 2025-05-09
Payer: COMMERCIAL

## 2025-05-11 ASSESSMENT — ENCOUNTER SYMPTOMS
BACK PAIN: 1
WEAKNESS: 1

## 2025-05-12 ENCOUNTER — APPOINTMENT (OUTPATIENT)
Dept: PRIMARY CARE | Facility: CLINIC | Age: 80
End: 2025-05-12
Payer: COMMERCIAL

## 2025-05-12 DIAGNOSIS — M54.50 LOW BACK PAIN WITHOUT SCIATICA, UNSPECIFIED BACK PAIN LATERALITY, UNSPECIFIED CHRONICITY: Primary | ICD-10-CM

## 2025-05-12 PROCEDURE — 99213 OFFICE O/P EST LOW 20 MIN: CPT | Performed by: FAMILY MEDICINE

## 2025-05-12 PROCEDURE — 1160F RVW MEDS BY RX/DR IN RCRD: CPT | Performed by: FAMILY MEDICINE

## 2025-05-12 PROCEDURE — 1036F TOBACCO NON-USER: CPT | Performed by: FAMILY MEDICINE

## 2025-05-12 PROCEDURE — 1159F MED LIST DOCD IN RCRD: CPT | Performed by: FAMILY MEDICINE

## 2025-05-12 ASSESSMENT — ENCOUNTER SYMPTOMS
DIZZINESS: 0
SHORTNESS OF BREATH: 0
MYALGIAS: 0
SLEEP DISTURBANCE: 0
PALPITATIONS: 0
VOMITING: 0
DIARRHEA: 0
CONSTIPATION: 0
FATIGUE: 0
DYSURIA: 0
POLYDIPSIA: 0
WEAKNESS: 1
NAUSEA: 0
DIFFICULTY URINATING: 0
POLYPHAGIA: 0
ABDOMINAL PAIN: 0
DYSPHORIC MOOD: 0
ARTHRALGIAS: 0
HEADACHES: 0
BACK PAIN: 1

## 2025-05-12 NOTE — PROGRESS NOTES
"Subjective   Patient ID: Tomas Morataya is a 80 y.o. male who presents for telemedicine visit    Virtual or Telephone Consent    An interactive audio and video telecommunication system which permits real time communications between the patient (at home) and provider (in office) was utilized to provide this telehealth service.   Verbal consent was requested and obtained from Tomas Morataya on this date, 05/12/25 for a telehealth visit and the patient's location was confirmed at the time of the visit.      Back Pain  This is a chronic problem. The current episode started more than 1 year ago. The problem occurs daily. The problem is unchanged. The pain is present in the lumbar spine. The quality of the pain is described as aching. The pain is at a severity of 5/10. The pain is Worse during the day. The symptoms are aggravated by standing. Stiffness is present All day. Associated symptoms include weakness. Pertinent negatives include no abdominal pain, chest pain, dysuria or headaches. Risk factors include lack of exercise, obesity, recent trauma and sedentary lifestyle.      Back pain: recurrent. Pain 5/10. Xrays showed worsening mod lumbar DDD. Some weakness in legs. Having trouble standing for prolonged periods of time. Legs \"start to shake.\" Would like to start PT. No bowel or bladder changes    Review of Systems   Constitutional:  Negative for fatigue.   Eyes:  Negative for visual disturbance.   Respiratory:  Negative for shortness of breath.    Cardiovascular:  Negative for chest pain and palpitations.   Gastrointestinal:  Negative for abdominal pain, constipation, diarrhea, nausea and vomiting.   Endocrine: Negative for polydipsia, polyphagia and polyuria.   Genitourinary:  Negative for difficulty urinating and dysuria.   Musculoskeletal:  Positive for back pain. Negative for arthralgias and myalgias.   Skin:  Negative for rash.   Neurological:  Positive for weakness. Negative for dizziness and headaches. "   Psychiatric/Behavioral:  Negative for dysphoric mood and sleep disturbance.        Objective   There were no vitals taken for this visit.    Physical Exam  HENT:      Head: Normocephalic.      Nose:      Comments: No conversational dyspnea  Pulmonary:      Effort: No respiratory distress.   Neurological:      General: No focal deficit present.      Mental Status: He is alert.   Psychiatric:         Mood and Affect: Mood normal.       Assessment/Plan   Problem List Items Addressed This Visit    None  Visit Diagnoses         Codes      Low back pain without sciatica, unspecified back pain laterality, unspecified chronicity    -  Primary M54.50    Relevant Orders    Referral to Physical Therapy        Reviewed xrays with pt. Recommendations given     Telemed 9 min. Documentation 4 min. Total 13 min

## 2025-05-15 ENCOUNTER — OFFICE VISIT (OUTPATIENT)
Dept: UROLOGY | Facility: HOSPITAL | Age: 80
End: 2025-05-15
Payer: COMMERCIAL

## 2025-05-15 DIAGNOSIS — N43.3 HYDROCELE, UNSPECIFIED HYDROCELE TYPE: Primary | ICD-10-CM

## 2025-05-15 PROCEDURE — 99211 OFF/OP EST MAY X REQ PHY/QHP: CPT | Performed by: UROLOGY

## 2025-05-15 PROCEDURE — 1159F MED LIST DOCD IN RCRD: CPT | Performed by: UROLOGY

## 2025-05-15 NOTE — PROGRESS NOTES
[facility]    Last visit - 4/11/25     HISTORY OF PRESENT ILLNESS:   Tomas Morataya is a 80 y.o. male who is being seen today for wound check s/p left hydrocelectomy 2/24/25  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   -ED on Viagra  PAST MEDICAL HISTORY:  Past Medical History:   Diagnosis Date    Allergy to other foods     History of food allergy    BPH (benign prostatic hyperplasia)     Essential (primary) hypertension     Benign essential hypertension    Hyperlipidemia     Personal history of other endocrine, nutritional and metabolic disease     History of hyperlipidemia    Personal history of other endocrine, nutritional and metabolic disease     History of diabetes mellitus    Type 2 diabetes mellitus        PAST SURGICAL HISTORY:  Past Surgical History:   Procedure Laterality Date    OTHER SURGICAL HISTORY  08/29/2019    Cyst excision    OTHER SURGICAL HISTORY  08/29/2019    Heart surgery        ALLERGIES:   Allergies   Allergen Reactions    Cefaclor Anaphylaxis and Swelling    Penicillins Swelling and Rash     FACIAL SWELLING        MEDICATIONS:   Current Outpatient Medications   Medication Instructions    acetaminophen (TYLENOL) 650 mg, Every 6 hours PRN    amLODIPine (NORVASC) 10 mg, oral, Daily    aspirin 81 mg EC tablet 1 tablet, Daily    atorvastatin (LIPITOR) 40 mg, oral, Daily    cyanocobalamin, vitamin B-12, (Vitamin B-12) 1,000 mcg tablet extended release 1 tablet, Daily    doxazosin (CARDURA) 4 mg, oral, Daily    folic acid (Folvite) 1 mg tablet 1 tablet, Daily    hydroCHLOROthiazide (HYDRODIURIL) 25 mg, oral, Daily    ketoconazole (NIZOral) 2 % cream Apply twice daily to affected areas of face    loratadine (Claritin) 10 mg tablet 1 tablet, Daily    losartan (COZAAR) 100 mg, oral, Daily    metFORMIN (GLUCOPHAGE) 1,000 mg, oral, 2 times daily    multivitamin tablet Take by mouth.    pioglitazone (ACTOS) 15 mg, oral, Daily    spironolactone (ALDACTONE) 25 mg, oral,  "Daily    triamcinolone (Nasacort) 55 mcg nasal inhaler 1-2 sprays, Daily        PHYSICAL EXAM:  There were no vitals taken for this visit.  Constitutional: Patient appears well-developed and well-nourished. No distress.    Pulmonary/Chest: Effort normal. No respiratory distress.   Abdominal: Soft, ND NT  : fullness of the right testicle  Musculoskeletal: Normal range of motion.    Neurological: Alert and oriented to person, place, and time.  Psychiatric: Normal mood and affect. Behavior is normal. Thought content normal.      Labs:  No results found for: \"TESTOSTERONE\"  Lab Results   Component Value Date    PSA 1.73 10/04/2024     No components found for: \"CBC\"  Lab Results   Component Value Date    CREATININE 0.72 03/08/2025     No components found for: \"TESTOTMS\"  No results found for: \"TESTF\"    Imaging: Scrotal US on 5/1/24 demonstrated No sign of intrauterine mass or torsion.  Large left hydrocele with some internal debris and internal septations. Underlying infection can not be excluded.  3 mm left epididymal head cyst.    Discussion:  Patient is s/p left hydrocelectomy on 2/24/25. Had some drainage from the back of the scrotum on the skin below the incision which is now progressively improving. Healing well. Wound was checked, cauterized some of the granulation tissue. No packing necessary, gauze placed on top of it. Will fu in 2 weeks for wound check.     Assessment:      No diagnosis found.      Tomas Morataya is a 80 y.o. male here for FUV     Plan:   1) fu in 2 weeks for wound check  All questions and concerns were addressed. Patient verbalizes understanding and has no other questions at this time.     Scribe Attestation  By signing my name below, I, Diony Corcoran   attest that this documentation has been prepared under the direction and in the presence of Marc Red MD.    "

## 2025-05-30 ENCOUNTER — EVALUATION (OUTPATIENT)
Dept: PHYSICAL THERAPY | Facility: CLINIC | Age: 80
End: 2025-05-30
Payer: COMMERCIAL

## 2025-05-30 DIAGNOSIS — M54.50 LOW BACK PAIN WITHOUT SCIATICA, UNSPECIFIED BACK PAIN LATERALITY, UNSPECIFIED CHRONICITY: Primary | ICD-10-CM

## 2025-05-30 PROCEDURE — 97110 THERAPEUTIC EXERCISES: CPT | Mod: GP | Performed by: PHYSICAL THERAPIST

## 2025-05-30 PROCEDURE — 97161 PT EVAL LOW COMPLEX 20 MIN: CPT | Mod: GP | Performed by: PHYSICAL THERAPIST

## 2025-05-30 ASSESSMENT — ENCOUNTER SYMPTOMS
OCCASIONAL FEELINGS OF UNSTEADINESS: 0
LOSS OF SENSATION IN FEET: 1
DEPRESSION: 0

## 2025-05-30 ASSESSMENT — PAIN - FUNCTIONAL ASSESSMENT: PAIN_FUNCTIONAL_ASSESSMENT: 0-10

## 2025-05-30 ASSESSMENT — PAIN SCALES - GENERAL: PAINLEVEL_OUTOF10: 8

## 2025-05-30 NOTE — PROGRESS NOTES
Physical Therapy    Physical Therapy Lumbar Spine Evaluation    Patient Name: Tomas Morataya  MRN: [id number]  Today's Date: 2025  Time Calculation  Start Time: 335  Stop Time: 425  Time Calculation (min): 50 min  PT Evaluation Time Entry  PT Evaluation (Low) Time Entry: 30  PT Therapeutic Procedures Time Entry  Therapeutic Exercise Time Entry: 20             Visit Number: 1  Auth Dates: 30 visits/year    Current Problem  Problem List Items Addressed This Visit           ICD-10-CM    Low back pain without sciatica - Primary M54.50    Relevant Orders    Follow Up In Physical Therapy          General  Name and  confirmed with patient on this date.       Ambulatory Screening Summary     Screening  Frequency  Date Last Completed   Spiritual and Cultural Beliefs   Screening  each visit or episode of care 2025   Falls Risk Screening  every ambulatory visit 2025  3:50 PM   Pain Screening  annually at primary care visit  3/11/2025   Domestic Violence screening  annually at primary care visit    Elder Abuse Screening  annually at primary care visit    Depression Screening  annually in the primary care setting 2025   Suicide Risk Screening  annually in the primary care setting 3/8/2025   Nutrition and Food Insecurity   Screening  at least annually at primary care visit     Key Learner  annually in the primary care setting 2025   Drug Screen  2025  2:35 PM   Alcohol Screen  2025  2:35 PM   Advance Directive           Precautions  Precautions  STEADI Fall Risk Score (The score of 4 or more indicates an increased risk of falling): 7       Pain  Pain Assessment: 0-10  0-10 (Numeric) Pain Score: 8  Pain Location: Back  Pain Orientation: Lower    SUBJECTIVE:   Chief complaint:  Pt is a 79 yo male referred to PT with back pain. Pt reports that he had surgery for hydrocele and has become sedentary while he is recovering. Now back is bothering him especially when standing and walking. Not using a  device most of the time, but occasionally uses cane or walker if he has to walk really long distances. Takes tylenol 2X/day for pain in back and bilat knees. One fall when throwing something up the stairs.    Pain Better: rest  Pain Worse: prolonged standing and walking  Imaging: moderate lumbar degenerative changes  Prior level of function:  Current limitations:   Home setup: two story home  Work: -mostly at desk  Patients goal: Be able to stand longer and walk normally, climb stairs more easily.  Prior tx:    OBJECTIVE:    Posture: flexed at hips    Spine ROM: WNL Unless documented below:  ROM (In degrees)   Flexion 65 needs to hold onto table   Extension - 10 from neutral    Right Left   Side Bend 5 5   Rotation         Lower Extremity Strength: 5/5    Gait: Pt ambulates with slow felicia and wide NINA. Distance limited by back pain.      Palpation no tenderness with palpation  Neurological symptoms neuropathy bilat feet  Special tests:  Slump negative  SLR negative        Other Measures  Oswestry Disablity Index (LAMAR): 16 raw score     TREATMENT:  EXERCISES Date 5/30/25 Date Date Date    REPS REPS REPS REPS          Nustep L4    mins             Shuttle   DLP       Shuttle   SLP              Shuttle   TR/HR                     Tband   Lat Pulls seated Blue 30X      Tband   Chops       Tband   Mid Row seated Blue 30X             Seated trunk flexion with ball 10X      Seated LAQ with weight       Seated hamstring curl                                                                                                            Access Code: [email or web address]  URL: [email or web address]  Date: 05/30/2025  Prepared by: Renee Brewer    Exercises  - Standing Shoulder Row with Anchored Resistance  - 1 x daily - 7 x weekly - 30 reps  - Shoulder extension with resistance - Neutral  - 1 x daily - 7 x weekly - 30 reps  - Seated Hamstring Curl with Anchored Resistance  - 1 x daily - 7 x weekly - 30  reps      ASSESSEMENT  Pt is a 80 y.o.  referred for physical therapy by Raquel Galvez DO  for back pain. Pt presents with decreased trunk ROM and strength, decreased LE strength and decreased balance. This patient would benefit from a therapy program to restore prior level of function, decrease pain, increase AROM, increase strength and improve posture.    The physical therapy prognosis is good for the patient to achieve their goals.   The pt tolerated therapy treatment today with no adverse effects.  Barriers to therapy/learning include:  none    PLAN   2X/week X 4 -8 weeks for trunk ROM and strengthening, LE strengthening and balance activities.    The pt has been educated about the risks and benefits of physical therapy and gives consent for treatment.     The patient will benefit from physical therapy treatment to include:  manual therapy, therapeutic exercise, modalities PRN    Goals:  Improve dynamic trunk strength so that patient can perform daily activities with minimal difficulty or back pain-4 weeks  Pt will be able to walk for at least 20 minutes without increased back pain-8 weeks  Pt will be able to stand and perform household chores or work activities for 30 minutes without increased back pain-8 weeks  Improve Modified LAMAR score < 8 to facilitate return to prior level of function-8 weeks  Pt will be independent with strengthening program that he can continue at local gym-8 weeks

## 2025-06-04 ENCOUNTER — TREATMENT (OUTPATIENT)
Dept: PHYSICAL THERAPY | Facility: CLINIC | Age: 80
End: 2025-06-04
Payer: COMMERCIAL

## 2025-06-04 DIAGNOSIS — M54.50 LOW BACK PAIN WITHOUT SCIATICA, UNSPECIFIED BACK PAIN LATERALITY, UNSPECIFIED CHRONICITY: Primary | ICD-10-CM

## 2025-06-04 PROCEDURE — 97110 THERAPEUTIC EXERCISES: CPT | Mod: GP,CQ

## 2025-06-04 ASSESSMENT — PAIN - FUNCTIONAL ASSESSMENT: PAIN_FUNCTIONAL_ASSESSMENT: 0-10

## 2025-06-04 ASSESSMENT — PAIN DESCRIPTION - DESCRIPTORS: DESCRIPTORS: ACHING;TIGHTNESS;SORE

## 2025-06-04 NOTE — PROGRESS NOTES
Physical Therapy    Physical Therapy Treatment    Patient Name: Tomas Morataya  MRN: [id number]  :1945  Today's Date: 2025    Time Entry:   Time Calculation  Start Time: 154  Stop Time:   Time Calculation (min): 43 min  PT Therapeutic Procedures Time Entry  Therapeutic Exercise Time Entry: 40          Visit:  2  Visit limit: 30 visits per year    Assessment:   Patient performed added exercises without complaints of increased lumbar or lower extremity symptoms. Patient indicates continues to fatigue quickly with standing and walking activities (goals #2 and #3 addressed). Patient reports sit to stand from lower surfaces continues to be challenging.       Plan:   Continue with trunk and lower extremity flexibility, stabilization and strengthening exercises progressing as tolerated to decrease pain with transfers, standing and walking activities.     Current Problem  1. Low back pain without sciatica, unspecified back pain laterality, unspecified chronicity  Follow Up In Physical Therapy          Subjective    Patient reports walking and standing for longer than 10 minutes continues to be challenging.     Precautions  Precautions  Precautions Comment: Fall risk    Pain  Pain Assessment  Pain Assessment: 0-10  Pain Location: Back  Pain Orientation: Lower  Pain Descriptors: Aching, Tightness, Sore    Objective   Added exercises. See exercise log for specifics.    Standing posture  Forward flexed at hips       Outcome Measures:  Other Measures  Oswestry Disablity Index (LAMAR): 16 raw score (Score at initial evaluation)    Treatments:  EXERCISES Date 25 Date  2025 Date Date    REPS REPS REPS REPS          Nustep L4    mins L4 10 minutes            Shuttle   DLP       Shuttle   SLP              Shuttle   TR/HR                     Tband   Lat Pulls seated Blue 30X Blue x 30     Tband   Chops       Tband   Mid Row seated Blue 30X Blue x 30            Seated trunk flexion with ball 10X 3 way   10 x each  "    Seated hamstring stretch  10\"H x 10 each            Seated LAQ with weight              Seated hamstring curl  25# 3 x 10     Bilateral knee extension  20# 3 x 10                                                                                                    OP EDUCATION:       Goals:  Improve dynamic trunk strength so that patient can perform daily activities with minimal difficulty or back pain-4 weeks  Pt will be able to walk for at least 20 minutes without increased back pain-8 weeks  Pt will be able to stand and perform household chores or work activities for 30 minutes without increased back pain-8 weeks  Improve Modified LAMAR score < 8 to facilitate return to prior level of function-8 weeks  Pt will be independent with strengthening program that he can continue at local gym-8 weeks  "

## 2025-06-11 ENCOUNTER — TREATMENT (OUTPATIENT)
Dept: PHYSICAL THERAPY | Facility: CLINIC | Age: 80
End: 2025-06-11
Payer: COMMERCIAL

## 2025-06-11 DIAGNOSIS — M54.50 LOW BACK PAIN WITHOUT SCIATICA, UNSPECIFIED BACK PAIN LATERALITY, UNSPECIFIED CHRONICITY: Primary | ICD-10-CM

## 2025-06-11 PROCEDURE — 97110 THERAPEUTIC EXERCISES: CPT | Mod: GP,CQ

## 2025-06-11 ASSESSMENT — PAIN SCALES - GENERAL: PAINLEVEL_OUTOF10: 5 - MODERATE PAIN

## 2025-06-11 ASSESSMENT — PAIN DESCRIPTION - DESCRIPTORS: DESCRIPTORS: ACHING;TIGHTNESS

## 2025-06-11 ASSESSMENT — PAIN - FUNCTIONAL ASSESSMENT: PAIN_FUNCTIONAL_ASSESSMENT: 0-10

## 2025-06-11 NOTE — PROGRESS NOTES
Physical Therapy    Physical Therapy Treatment    Patient Name: Tomas Morataya  MRN: [id number]  :1945  Today's Date: 2025    Time Entry:   Time Calculation  Start Time: 845  Stop Time: 933  Time Calculation (min): 48 min  PT Therapeutic Procedures Time Entry  Therapeutic Exercise Time Entry: 46          Visit:  3  Visit limit: 30 visits per year    Assessment:   Patient performed added and tolerated increases in exercises without complaints of increased lumbar or lower extremity symptoms. Patient indicates continues to fatigue quickly with standing and walking activities (goals #2 and #3 addressed). Patient presented with no episodes of loss of balance with transfers or with gait activities.       Plan:   Continue with trunk and lower extremity flexibility, stabilization and strengthening exercises progressing as tolerated to decrease pain with transfers, standing and walking activities.       Current Problem  1. Low back pain without sciatica, unspecified back pain laterality, unspecified chronicity  Follow Up In Physical Therapy          Subjective    Patient reports no complaints of increased low back or lower extremity symptoms with daily or work activities after previous treatment.     Precautions  Precautions  Precautions Comment: Fall risk    Pain  Pain Assessment  Pain Assessment: 0-10  0-10 (Numeric) Pain Score: 5 - Moderate pain  Pain Location: Back  Pain Orientation: Lower  Pain Descriptors: Aching, Tightness    Objective   Added exercises. See exercise log for specifics.    Standing posture  Forward flexed at hips       Outcome Measures:  Other Measures  Oswestry Disablity Index (LAMAR): 16 raw score (Score at initial evaluation)    Treatments:  EXERCISES Date 25 Date  2025 Date  2025 Date    REPS REPS REPS REPS          Nustep L4    mins L4 10 minutes L4 10 minutes           Shuttle   DLP       Shuttle   SLP              Shuttle   TR/HR                     Tband   Lat Pulls  "seated Blue 30X Blue x 30 Blue x 30    Tband Skis   Blue x 30    Tband   Mid Row seated Blue 30X Blue x 30 Blue x 30           Seated trunk flexion with ball 10X 3 way   10 x each 3 way 10 x each    Seated hamstring stretch  10\"H x 10 each 10\"H x 10 each           Seated LAQ with weight              Seated hamstring curl  25# 3 x 10 25# 3 x 10    Bilateral knee extension  20# 3 x 10 20# 3 x 10           Back extension    40# 3 x 10                                                                                     OP EDUCATION:       Goals:  Improve dynamic trunk strength so that patient can perform daily activities with minimal difficulty or back pain-4 weeks  Pt will be able to walk for at least 20 minutes without increased back pain-8 weeks  Pt will be able to stand and perform household chores or work activities for 30 minutes without increased back pain-8 weeks  Improve Modified LAMAR score < 8 to facilitate return to prior level of function-8 weeks  Pt will be independent with strengthening program that he can continue at local gym-8 weeks  "

## 2025-06-13 ENCOUNTER — TREATMENT (OUTPATIENT)
Dept: PHYSICAL THERAPY | Facility: CLINIC | Age: 80
End: 2025-06-13
Payer: COMMERCIAL

## 2025-06-13 DIAGNOSIS — M54.50 LOW BACK PAIN WITHOUT SCIATICA, UNSPECIFIED BACK PAIN LATERALITY, UNSPECIFIED CHRONICITY: Primary | ICD-10-CM

## 2025-06-13 PROCEDURE — 97110 THERAPEUTIC EXERCISES: CPT | Mod: GP | Performed by: PHYSICAL THERAPIST

## 2025-06-13 ASSESSMENT — PAIN SCALES - GENERAL: PAINLEVEL_OUTOF10: 0 - NO PAIN

## 2025-06-13 ASSESSMENT — PAIN - FUNCTIONAL ASSESSMENT: PAIN_FUNCTIONAL_ASSESSMENT: 0-10

## 2025-06-13 NOTE — PROGRESS NOTES
"Physical Therapy    Physical Therapy Treatment    Patient Name: Tomas Morataya  MRN: [id number]  :1945  Today's Date: 2025    Time Entry:   Time Calculation  Start Time: 245  Stop Time: 332  Time Calculation (min): 47 min  PT Therapeutic Procedures Time Entry  Therapeutic Exercise Time Entry: 47          Visit:  4  Visit limit: 30 visits per year    Assessment:  No pain with strengthening program in clinic. Pt did not require any rest breaks to complete full program.    Plan:   Continue with trunk and lower extremity flexibility, stabilization and strengthening exercises progressing as tolerated to decrease pain with transfers, standing and walking activities.       Current Problem  1. Low back pain without sciatica, unspecified back pain laterality, unspecified chronicity  Follow Up In Physical Therapy          Subjective    Pt reports that he was fatigued after last PT visit, but no increase in back pain.    Precautions  Precautions  Precautions Comment: Fall risk    Pain  Pain Assessment  Pain Assessment: 0-10  0-10 (Numeric) Pain Score: 0 - No pain    Objective       Standing posture  Forward flexed at hips       Outcome Measures:       Treatments:  EXERCISES Date 25 Date  2025 Date  2025 Date  25    REPS REPS REPS REPS          Nustep L4    mins L4 10 minutes L4 10 minutes L4  10 mins          Shuttle   DLP       Shuttle   SLP              Shuttle   TR/HR                     Tband   Lat Pulls seated Blue 30X Blue x 30 Blue x 30 Blue 30X   Tband Skis   Blue x 30 Blue 30X   Tband   Mid Row seated Blue 30X Blue x 30 Blue x 30 blue 30X          Seated trunk flexion with ball 10X 3 way   10 x each 3 way 10 x each  3 way 10X ea way   Seated hamstring stretch  10\"H x 10 each 10\"H x 10 each 10\"H X 10          Seated LAQ with weight              Seated hamstring curl  25# 3 x 10 25# 3 x 10 35#  3 X 10   Bilateral knee extension  20# 3 x 10 20# 3 x 10 20#  3 X 10          Back extension  "   40# 3 x 10 40#  3 X 10                                                                                    OP EDUCATION:       Goals:  Improve dynamic trunk strength so that patient can perform daily activities with minimal difficulty or back pain-4 weeks  Pt will be able to walk for at least 20 minutes without increased back pain-8 weeks  Pt will be able to stand and perform household chores or work activities for 30 minutes without increased back pain-8 weeks  Improve Modified LAMAR score < 8 to facilitate return to prior level of function-8 weeks  Pt will be independent with strengthening program that he can continue at local gym-8 weeks

## 2025-06-16 ENCOUNTER — APPOINTMENT (OUTPATIENT)
Dept: PHYSICAL THERAPY | Facility: CLINIC | Age: 80
End: 2025-06-16
Payer: COMMERCIAL

## 2025-06-16 DIAGNOSIS — M54.50 LOW BACK PAIN WITHOUT SCIATICA, UNSPECIFIED BACK PAIN LATERALITY, UNSPECIFIED CHRONICITY: Primary | ICD-10-CM

## 2025-06-16 PROCEDURE — 97110 THERAPEUTIC EXERCISES: CPT | Mod: GP | Performed by: PHYSICAL THERAPIST

## 2025-06-16 ASSESSMENT — PAIN SCALES - GENERAL: PAINLEVEL_OUTOF10: 0 - NO PAIN

## 2025-06-16 ASSESSMENT — PAIN - FUNCTIONAL ASSESSMENT: PAIN_FUNCTIONAL_ASSESSMENT: 0-10

## 2025-06-16 NOTE — PROGRESS NOTES
"Physical Therapy    Physical Therapy Treatment    Patient Name: Tomas Morataya  MRN: [id number]  :1945  Today's Date: 2025    Time Entry:   Time Calculation  Start Time: 330  Stop Time: 412  Time Calculation (min): 42 min  PT Therapeutic Procedures Time Entry  Therapeutic Exercise Time Entry: 42          Visit:  5  Visit limit: 30 visits per year    Assessment:  Increased resistance on Nustep and back extension without difficulty. Patient's resting heart rate has improved since starting PT.    Plan:   Continue with trunk and lower extremity flexibility, stabilization and strengthening exercises progressing as tolerated to decrease pain with transfers, standing and walking activities.       Current Problem  1. Low back pain without sciatica, unspecified back pain laterality, unspecified chronicity  Follow Up In Physical Therapy          Subjective    Pt reports that he is feeling good today and not having any back pain.    Precautions  Precautions  Precautions Comment: Fall risk    Pain  Pain Assessment  Pain Assessment: 0-10  0-10 (Numeric) Pain Score: 0 - No pain    Objective   Gait: Pt ambulates without device with wide NINA     Outcome Measures:       Treatments:  EXERCISES Date 25 Date  2025 Date  2025 Date  25    REPS REPS REPS REPS            Nustep L4    mins L4 10 minutes L4 10 minutes L4  10 mins L5  10 mins           Shuttle   DLP        Shuttle   SLP               Shuttle   TR/HR        Sit to stand without UE     10X           Tband   Lat Pulls seated Blue 30X Blue x 30 Blue x 30 Blue 30X Blue 30X   Tband Skis   Blue x 30 Blue 30X Blue 30X   Tband   Mid Row seated Blue 30X Blue x 30 Blue x 30 blue 30X Blue 30X           Seated trunk flexion with ball 10X 3 way   10 x each 3 way 10 x each  3 way 10X ea way 3 way 10X ea way   Seated hamstring stretch  10\"H x 10 each 10\"H x 10 each 10\"H X 10 30\" X 2 ea leg                           Seated hamstring curl  25# 3 x 10 " 25# 3 x 10 35#  3 X 10 25#  3 X 10   Bilateral knee extension  20# 3 x 10 20# 3 x 10 20#  3 X 10 20#   3 X 10           Back extension    40# 3 x 10 40#  3 X 10 50#  3 X 10                                                                                               OP EDUCATION:       Goals:  Improve dynamic trunk strength so that patient can perform daily activities with minimal difficulty or back pain-4 weeks  Pt will be able to walk for at least 20 minutes without increased back pain-8 weeks  Pt will be able to stand and perform household chores or work activities for 30 minutes without increased back pain-8 weeks  Improve Modified LAMAR score < 8 to facilitate return to prior level of function-8 weeks  Pt will be independent with strengthening program that he can continue at local gym-8 weeks

## 2025-06-18 ENCOUNTER — APPOINTMENT (OUTPATIENT)
Dept: PHYSICAL THERAPY | Facility: CLINIC | Age: 80
End: 2025-06-18
Payer: COMMERCIAL

## 2025-06-18 DIAGNOSIS — M54.50 LOW BACK PAIN WITHOUT SCIATICA, UNSPECIFIED BACK PAIN LATERALITY, UNSPECIFIED CHRONICITY: Primary | ICD-10-CM

## 2025-06-20 ENCOUNTER — TREATMENT (OUTPATIENT)
Dept: PHYSICAL THERAPY | Facility: CLINIC | Age: 80
End: 2025-06-20
Payer: COMMERCIAL

## 2025-06-20 DIAGNOSIS — M54.50 LOW BACK PAIN WITHOUT SCIATICA, UNSPECIFIED BACK PAIN LATERALITY, UNSPECIFIED CHRONICITY: Primary | ICD-10-CM

## 2025-06-20 PROCEDURE — 97110 THERAPEUTIC EXERCISES: CPT | Mod: GP | Performed by: PHYSICAL THERAPIST

## 2025-06-20 ASSESSMENT — PAIN SCALES - GENERAL: PAINLEVEL_OUTOF10: 0 - NO PAIN

## 2025-06-20 ASSESSMENT — PAIN - FUNCTIONAL ASSESSMENT: PAIN_FUNCTIONAL_ASSESSMENT: 0-10

## 2025-06-20 NOTE — PROGRESS NOTES
"Physical Therapy    Physical Therapy Treatment    Patient Name: Tomas Morataya  MRN: [id number]  :1945  Today's Date: 2025    Time Entry:   Time Calculation  Start Time: 1115  Stop Time: 1159  Time Calculation (min): 44 min  PT Therapeutic Procedures Time Entry  Therapeutic Exercise Time Entry: 44          Visit:  6  Visit limit: 30 visits per year    Assessment:  Continues to do well with gradual progression of strengthening. Slight lumbar soreness with knee extension machine today.     Plan:   Continue with trunk and lower extremity flexibility, stabilization and strengthening exercises progressing as tolerated to decrease pain with transfers, standing and walking activities.       Current Problem  1. Low back pain without sciatica, unspecified back pain laterality, unspecified chronicity  Follow Up In Physical Therapy          Subjective    Pt reports that he felt some right sided LBP when he was walking into PT, but it resolved once he sat down.    Precautions  Precautions  Precautions Comment: falls risk    Pain  Pain Assessment  Pain Assessment: 0-10  0-10 (Numeric) Pain Score: 0 - No pain    Objective   Gait: Pt ambulates without device with wide NINA     Outcome Measures:       Treatments:  EXERCISES Date 25 Date  2025 Date  2025 Date  25    REPS REPS REPS REPS              Nustep L4    mins L4 10 minutes L4 10 minutes L4  10 mins L5  10 mins L5  10 mins            Shuttle   DLP         Shuttle   SLP                Shuttle   TR/HR         Sit to stand without UE     10X 10X            Tband   Lat Pulls seated Blue 30X Blue x 30 Blue x 30 Blue 30X Blue 30X Black 30X   Tband Skis   Blue x 30 Blue 30X Blue 30X Black 30X   Tband   Mid Row seated Blue 30X Blue x 30 Blue x 30 blue 30X Blue 30X Black 30X            Seated trunk flexion with ball 10X 3 way   10 x each 3 way 10 x each  3 way 10X ea way 3 way 10X ea way 3 way 10X ea way   Seated hamstring stretch  10\"H x " "10 each 10\"H x 10 each 10\"H X 10 30\" X 2 ea leg 30\" X 2                              Seated hamstring curl  25# 3 x 10 25# 3 x 10 35#  3 X 10 25#  3 X 10 25#  3 X 10   Bilateral knee extension  20# 3 x 10 20# 3 x 10 20#  3 X 10 20#   3 X 10 20#  3 X 10            Back extension    40# 3 x 10 40#  3 X 10 50#  3 X 10 50#  3 X 15                                                                                                          OP EDUCATION:       Goals:  Improve dynamic trunk strength so that patient can perform daily activities with minimal difficulty or back pain-4 weeks  Pt will be able to walk for at least 20 minutes without increased back pain-8 weeks  Pt will be able to stand and perform household chores or work activities for 30 minutes without increased back pain-8 weeks  Improve Modified LAMAR score < 8 to facilitate return to prior level of function-8 weeks  Pt will be independent with strengthening program that he can continue at local gym-8 weeks  "

## 2025-06-23 ENCOUNTER — TREATMENT (OUTPATIENT)
Dept: PHYSICAL THERAPY | Facility: CLINIC | Age: 80
End: 2025-06-23
Payer: COMMERCIAL

## 2025-06-23 DIAGNOSIS — M54.50 LOW BACK PAIN WITHOUT SCIATICA, UNSPECIFIED BACK PAIN LATERALITY, UNSPECIFIED CHRONICITY: Primary | ICD-10-CM

## 2025-06-23 PROCEDURE — 97110 THERAPEUTIC EXERCISES: CPT | Mod: GP,CQ

## 2025-06-23 ASSESSMENT — PAIN - FUNCTIONAL ASSESSMENT: PAIN_FUNCTIONAL_ASSESSMENT: 0-10

## 2025-06-23 ASSESSMENT — PAIN DESCRIPTION - DESCRIPTORS: DESCRIPTORS: TIGHTNESS

## 2025-06-23 ASSESSMENT — PAIN SCALES - GENERAL: PAINLEVEL_OUTOF10: 0 - NO PAIN

## 2025-06-23 NOTE — PROGRESS NOTES
Physical Therapy    Physical Therapy Treatment    Patient Name: Tomas Morataya  MRN: [id number]  :1945  Today's Date: 2025    Time Entry:   Time Calculation  Start Time: 154  Stop Time:   Time Calculation (min): 42 min  PT Therapeutic Procedures Time Entry  Therapeutic Exercise Time Entry: 40          Visit:  7  Visit limit: 30 visits per year    Assessment:  Patient tolerated increases in exercises without complaints of increased lumbar or lower extremity symptoms. Patient indicates continues to fatigue quickly with standing and walking activities (goals #2 and #3 addressed) for periods > 5-10 minutes. Patient presented with no episodes of loss of balance with transfers or with gait activities.       Plan:   Continue with trunk and lower extremity flexibility, stabilization and strengthening exercises progressing as tolerated to decrease pain with transfers, standing and walking activities.     Patient scheduled for a reassessment on 2025.      Current Problem  1. Low back pain without sciatica, unspecified back pain laterality, unspecified chronicity  Follow Up In Physical Therapy          Subjective    Patient reports mild tightness in low back with initial standing after sitting for extended periods.     Precautions  Precautions  Precautions Comment: falls risk    Pain  Pain Assessment  Pain Assessment: 0-10  0-10 (Numeric) Pain Score: 0 - No pain  Pain Location: Back (Legs)  Pain Descriptors: Tightness    Objective   Gait: Patient continues to ambulate with wide NINA without device.        Outcome Measures:  Other Measures  Oswestry Disablity Index (LAMAR): 16 raw score (Score at initial evaluation)    Treatments:  EXERCISES 25 Date  2025               Nustep L5  10 mins L5  10 mins L5 10 minutes         Shuttle   DLP      Shuttle   SLP             Shuttle   TR/HR      Sit to stand without UE 10X 10X 10x         Tband   Lat Pulls seated Blue 30X Black 30X Black x 30   Tband  "Skis Blue 30X Black 30X Black x 30   Tband   Mid Row seated Blue 30X Black 30X Black x 30         Seated trunk flexion with ball 3 way 10X ea way 3 way 10X ea way 3 way x 10 each   Seated hamstring stretch 30\" X 2 ea leg 30\" X 2 30\" x 3 each                     Seated hamstring curl 25#  3 X 10 25#  3 X 10 35# 3 x 15   Bilateral knee extension 20#   3 X 10 20#  3 X 10 25# 3 x 15         Back extension  50#  3 X 10 50#  3 X 15 60# 3 x 20                                                                         OP EDUCATION:       Goals:  Improve dynamic trunk strength so that patient can perform daily activities with minimal difficulty or back pain-4 weeks  Pt will be able to walk for at least 20 minutes without increased back pain-8 weeks  Pt will be able to stand and perform household chores or work activities for 30 minutes without increased back pain-8 weeks  Improve Modified LAMAR score < 8 to facilitate return to prior level of function-8 weeks  Pt will be independent with strengthening program that he can continue at local gym-8 weeks  "

## 2025-06-25 ENCOUNTER — TREATMENT (OUTPATIENT)
Dept: PHYSICAL THERAPY | Facility: CLINIC | Age: 80
End: 2025-06-25
Payer: COMMERCIAL

## 2025-06-25 DIAGNOSIS — M54.50 LOW BACK PAIN WITHOUT SCIATICA, UNSPECIFIED BACK PAIN LATERALITY, UNSPECIFIED CHRONICITY: Primary | ICD-10-CM

## 2025-06-25 PROCEDURE — 97110 THERAPEUTIC EXERCISES: CPT | Mod: GP,CQ

## 2025-06-25 ASSESSMENT — PAIN SCALES - GENERAL: PAINLEVEL_OUTOF10: 0 - NO PAIN

## 2025-06-25 ASSESSMENT — PAIN - FUNCTIONAL ASSESSMENT: PAIN_FUNCTIONAL_ASSESSMENT: 0-10

## 2025-06-25 ASSESSMENT — PAIN DESCRIPTION - DESCRIPTORS: DESCRIPTORS: TIGHTNESS

## 2025-06-25 NOTE — PROGRESS NOTES
Physical Therapy    Physical Therapy Treatment    Patient Name: Tomas Morataya  MRN: [id number]  :1945  Today's Date: 2025    Time Entry:   Time Calculation  Start Time: 154  Stop Time: 1626  Time Calculation (min): 41 min  PT Therapeutic Procedures Time Entry  Therapeutic Exercise Time Entry: 40          Visit:  8  Visit limit: 30 visits per year    Assessment:  Patient tolerated increases in exercises without complaints of increased lumbar or lower extremity symptoms. Patient presented with improved trunk flexibility since last recorded measures.  Patient continues to present with no episodes of loss of balance with transfers or with gait activities.       Plan:   Continue with trunk and lower extremity flexibility, stabilization and strengthening exercises progressing as tolerated to decrease pain with transfers, standing and walking activities.     Patient scheduled for a reassessment on 2025.      Current Problem  1. Low back pain without sciatica, unspecified back pain laterality, unspecified chronicity  Follow Up In Physical Therapy          Subjective    Patient reports no complaints of increased low back symptoms with daily or work activities since last treatment.      Precautions  Precautions  Precautions Comment: falls risk    Pain  Pain Assessment  Pain Assessment: 0-10  0-10 (Numeric) Pain Score: 0 - No pain  Pain Type: Chronic pain  Pain Location: Back  Pain Descriptors: Tightness    Objective   Seated trunk range of motion  WFL       Outcome Measures:  Other Measures  Oswestry Disablity Index (LAMAR): 16 raw score (Score at initial evaluation)    Treatments:  EXERCISES Date  2025              Nustep L5 10 minutes         Shuttle   DLP     Shuttle   SLP            Shuttle   TR/HR     Sit to stand without UE 10x         Tband   Lat Pulls seated Black x 30    Tband Skis Black x 30    Tband   Mid Row seated Black x 30         Seated trunk flexion with ball 3 way x 10 each    Seated  "hamstring stretch 30\" x 3 each                   Seated hamstring curl 35# 3 x 20    Bilateral knee extension 25# 3 x 15         Back extension  70# 3 x 20                                                               OP EDUCATION:       Goals:  Improve dynamic trunk strength so that patient can perform daily activities with minimal difficulty or back pain-4 weeks  Pt will be able to walk for at least 20 minutes without increased back pain-8 weeks  Pt will be able to stand and perform household chores or work activities for 30 minutes without increased back pain-8 weeks  Improve Modified LAMAR score < 8 to facilitate return to prior level of function-8 weeks  Pt will be independent with strengthening program that he can continue at local gym-8 weeks  "

## 2025-06-27 ENCOUNTER — OFFICE VISIT (OUTPATIENT)
Dept: UROLOGY | Facility: HOSPITAL | Age: 80
End: 2025-06-27
Payer: COMMERCIAL

## 2025-06-27 DIAGNOSIS — R39.9 LOWER URINARY TRACT SYMPTOMS (LUTS): ICD-10-CM

## 2025-06-27 DIAGNOSIS — N52.9 ERECTILE DYSFUNCTION, UNSPECIFIED ERECTILE DYSFUNCTION TYPE: ICD-10-CM

## 2025-06-27 LAB
POC APPEARANCE, URINE: CLEAR
POC BILIRUBIN, URINE: NEGATIVE
POC BLOOD, URINE: NEGATIVE
POC COLOR, URINE: YELLOW
POC GLUCOSE, URINE: NEGATIVE MG/DL
POC KETONES, URINE: NEGATIVE MG/DL
POC LEUKOCYTES, URINE: ABNORMAL
POC NITRITE,URINE: NEGATIVE
POC PH, URINE: 6.5 PH
POC PROTEIN, URINE: NEGATIVE MG/DL
POC SPECIFIC GRAVITY, URINE: >=1.03
POC UROBILINOGEN, URINE: 0.2 EU/DL

## 2025-06-27 PROCEDURE — 99213 OFFICE O/P EST LOW 20 MIN: CPT | Performed by: UROLOGY

## 2025-06-27 PROCEDURE — 1159F MED LIST DOCD IN RCRD: CPT | Performed by: UROLOGY

## 2025-06-27 PROCEDURE — 81003 URINALYSIS AUTO W/O SCOPE: CPT | Performed by: UROLOGY

## 2025-06-27 RX ORDER — SILDENAFIL 100 MG/1
100 TABLET, FILM COATED ORAL AS NEEDED
Qty: 30 TABLET | Refills: 3 | Status: SHIPPED | OUTPATIENT
Start: 2025-06-27 | End: 2025-07-27

## 2025-06-27 NOTE — PROGRESS NOTES
FUV    Last visit - 5/15/25     HISTORY OF PRESENT ILLNESS:   Tomas Morataya is a 80 y.o. male who is being seen today for follow up.    Patient is s/p left hydrocelectomy 2/24/25- developed an open wounds which was draining.     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   -ED on Viagra    PAST MEDICAL HISTORY:  Past Medical History:   Diagnosis Date    Allergy to other foods     History of food allergy    BPH (benign prostatic hyperplasia)     Essential (primary) hypertension     Benign essential hypertension    Hyperlipidemia     Personal history of other endocrine, nutritional and metabolic disease     History of hyperlipidemia    Personal history of other endocrine, nutritional and metabolic disease     History of diabetes mellitus    Type 2 diabetes mellitus        PAST SURGICAL HISTORY:  Past Surgical History:   Procedure Laterality Date    OTHER SURGICAL HISTORY  08/29/2019    Cyst excision    OTHER SURGICAL HISTORY  08/29/2019    Heart surgery        ALLERGIES:   Allergies   Allergen Reactions    Cefaclor Anaphylaxis and Swelling    Penicillins Swelling and Rash     FACIAL SWELLING        MEDICATIONS:   Current Outpatient Medications   Medication Instructions    acetaminophen (TYLENOL) 650 mg, Every 6 hours PRN    amLODIPine (NORVASC) 10 mg, oral, Daily    aspirin 81 mg EC tablet 1 tablet, Daily    atorvastatin (LIPITOR) 40 mg, oral, Daily    cyanocobalamin, vitamin B-12, (Vitamin B-12) 1,000 mcg tablet extended release 1 tablet, Daily    doxazosin (CARDURA) 4 mg, oral, Daily    folic acid (Folvite) 1 mg tablet 1 tablet, Daily    hydroCHLOROthiazide (HYDRODIURIL) 25 mg, oral, Daily    ketoconazole (NIZOral) 2 % cream Apply twice daily to affected areas of face    loratadine (Claritin) 10 mg tablet 1 tablet, Daily    losartan (COZAAR) 100 mg, oral, Daily    metFORMIN (GLUCOPHAGE) 1,000 mg, oral, 2 times daily    multivitamin tablet Take by mouth.    pioglitazone (ACTOS)  "15 mg, oral, Daily    spironolactone (ALDACTONE) 25 mg, oral, Daily    triamcinolone (Nasacort) 55 mcg nasal inhaler 1-2 sprays, Daily        PHYSICAL EXAM:  There were no vitals taken for this visit.  Constitutional: Patient appears well-developed and well-nourished. No distress.    Pulmonary/Chest: Effort normal. No respiratory distress.   Abdominal: Soft, ND NT  : fullness of the right testicle  Musculoskeletal: Normal range of motion.    Neurological: Alert and oriented to person, place, and time.  Psychiatric: Normal mood and affect. Behavior is normal. Thought content normal.      Labs:  No results found for: \"TESTOSTERONE\"  Lab Results   Component Value Date    PSA 1.73 10/04/2024     No components found for: \"CBC\"  Lab Results   Component Value Date    CREATININE 0.72 03/08/2025     No components found for: \"TESTOTMS\"  No results found for: \"TESTF\"    Imaging: Scrotal US on 5/1/24 demonstrated No sign of intrauterine mass or torsion.  Large left hydrocele with some internal debris and internal septations. Underlying infection can not be excluded.  3 mm left epididymal head cyst.    Discussion:  Patient is s/p left hydrocelectomy on 2/24/25. Wound has completely Healed. Doing well, denies any fever chills or signs of infections. Discussed ED concerns. Previously tried Viagra, requests a new prescription. Med counseling done, denies using nitrates. Will fu in 6 months with a PSA prior.    ASHLEY: 1  AUA: 7    Assessment:      1. Lower urinary tract symptoms (LUTS)  POCT UA Automated manually resulted            Tomas Morataya is a 80 y.o. male here for FUV     Plan:   1) Sildenafil 100 mg Prn  2) Fu in 6 months with PSA prior    All questions and concerns were addressed. Patient verbalizes understanding and has no other questions at this time.     Scribe Attestation  By signing my name below, ISalina Scribe   attest that this documentation has been prepared under the direction and in the presence of " Marc Red MD.

## 2025-06-30 ENCOUNTER — TREATMENT (OUTPATIENT)
Dept: PHYSICAL THERAPY | Facility: CLINIC | Age: 80
End: 2025-06-30
Payer: COMMERCIAL

## 2025-06-30 DIAGNOSIS — M54.50 LOW BACK PAIN WITHOUT SCIATICA, UNSPECIFIED BACK PAIN LATERALITY, UNSPECIFIED CHRONICITY: Primary | ICD-10-CM

## 2025-06-30 PROCEDURE — 97110 THERAPEUTIC EXERCISES: CPT | Mod: GP | Performed by: PHYSICAL THERAPIST

## 2025-06-30 ASSESSMENT — PAIN - FUNCTIONAL ASSESSMENT: PAIN_FUNCTIONAL_ASSESSMENT: 0-10

## 2025-06-30 ASSESSMENT — PAIN SCALES - GENERAL
PAINLEVEL_OUTOF10: 3
PAINLEVEL_OUTOF10: 0 - NO PAIN

## 2025-06-30 NOTE — PROGRESS NOTES
Physical Therapy    Physical Therapy Treatment/Reassessment    Patient Name: Tomas Morataya  MRN: [id number]  :1945  Today's Date: 2025    Time Entry:   Time Calculation  Start Time: 415  Stop Time: 515  Time Calculation (min): 60 min  PT Therapeutic Procedures Time Entry  Therapeutic Exercise Time Entry: 50     Non-Billable Time  Non-billable time: 10  Non-billable time reason: recheck    Visit:  9  Visit limit: 30 visits per year    Assessment:  Back pain is improving as well as overall strength and function. Pt has weaned from assistive devices for ambulation. Pt needs continued PT to improve trunk and LE strength and facilitate return to prior level of function.      Plan:   Continue with trunk and lower extremity flexibility, stabilization and strengthening exercises progressing as tolerated to decrease pain with transfers, standing and walking activities.           Current Problem  1. Low back pain without sciatica, unspecified back pain laterality, unspecified chronicity  Follow Up In Physical Therapy          Subjective    Pt reports that he feels PT is helping him. No longer using cane for ambulation. Has bilat gluteal pain upon sitting up in the morning which improves once he gets moving. Continues to feel weak with prolonged standing and walking.    Precautions  Precautions  Precautions Comment: falls risk    Pain  Pain Assessment  Pain Assessment: 0-10  0-10 (Numeric) Pain Score: 0 - No pain  Pain Location: Back  Multiple Pain Sites: Two  Pain 2  Pain Score 2: 3  Pain Location 2: Nose    Objective   Lumbar ROM:  Flexion: 65 degrees  Extension: 0    Gait: Pt ambulates without device with wide NINA with improved felicia compared to evaluation.    Stairs: Pt ascends/descends stairs with reciprocal pattern and two hand rails.      Outcome Measures:  Other Measures  Oswestry Disablity Index (LAMAR): 16 raw score    Treatments:  EXERCISES Date  2025             Nustep L5 10 minutes L5   "10 mins        Shuttle   DLP     Shuttle   SLP            Shuttle   TR/HR     Sit to stand without UE 10x 10X        Tband   Lat Pulls seated Black x 30 Black 30X   Tband Skis Black x 30 Black 30X   Tband   Mid Row seated Black x 30 Black 30X        Seated trunk flexion with ball 3 way x 10 each 3 way X 10 ea   Seated hamstring stretch 30\" x 3 each 30\" X 3                  Seated hamstring curl 35# 3 x 20 40#  3 X 20   Bilateral knee extension 25# 3 x 15 30#  3 X 15        Back extension  70# 3 x 20 70#  3 X 20                                                              OP EDUCATION:       Goals:  Improve dynamic trunk strength so that patient can perform daily activities with minimal difficulty or back pain-4 weeks-6/30/25 PROGRESSING  Pt will be able to walk for at least 20 minutes without increased back pain-8 weeks-6/30/25 PROGRESSING  Pt will be able to stand and perform household chores or work activities for 30 minutes without increased back pain-8 weeks-6/30/25 PROGRESSING  Improve Modified LAMAR score < 8 to facilitate return to prior level of function-8 weeks-6/30/25 PROGRESSING  Pt will be independent with strengthening program that he can continue at local gym-8 weeks-6/30/25 PROGRESSINGV  "

## 2025-07-02 ENCOUNTER — TREATMENT (OUTPATIENT)
Dept: PHYSICAL THERAPY | Facility: CLINIC | Age: 80
End: 2025-07-02
Payer: COMMERCIAL

## 2025-07-02 DIAGNOSIS — M54.50 LOW BACK PAIN WITHOUT SCIATICA, UNSPECIFIED BACK PAIN LATERALITY, UNSPECIFIED CHRONICITY: Primary | ICD-10-CM

## 2025-07-02 PROCEDURE — 97110 THERAPEUTIC EXERCISES: CPT | Mod: GP,CQ

## 2025-07-02 ASSESSMENT — PAIN SCALES - GENERAL: PAINLEVEL_OUTOF10: 0 - NO PAIN

## 2025-07-02 ASSESSMENT — PAIN - FUNCTIONAL ASSESSMENT: PAIN_FUNCTIONAL_ASSESSMENT: 0-10

## 2025-07-02 ASSESSMENT — PAIN DESCRIPTION - DESCRIPTORS: DESCRIPTORS: DULL;TIGHTNESS

## 2025-07-02 NOTE — PROGRESS NOTES
Physical Therapy    Physical Therapy Treatment    Patient Name: Tomas Morataya  MRN: [id number]  :1945  Today's Date: 2025    Time Entry:   Time Calculation  Start Time: 1400  Stop Time: 1446  Time Calculation (min): 46 min  PT Therapeutic Procedures Time Entry  Therapeutic Exercise Time Entry: 44          Visit:  10  Visit limit: 30 visits per year    Assessment:  Patient performed exercises without complaints of increased lumbar or lower extremity symptoms. Patient indicates overall decreases in lumbar symptoms with sit to stand transfers and with initial standing/walking activities.       Plan:   Continue with trunk and lower extremity flexibility, stabilization and strengthening exercises progressing as tolerated to decrease pain with transfers, standing and walking activities.           Current Problem  1. Low back pain without sciatica, unspecified back pain laterality, unspecified chronicity  Follow Up In Physical Therapy          Subjective    Patient reports back and legs feel okay today. Patient reports felt a little tired after previous treatment however no increases in pain levels.     Precautions  Precautions  Precautions Comment: falls risk    Pain  Pain Assessment  Pain Assessment: 0-10  0-10 (Numeric) Pain Score: 0 - No pain  Pain Type: Chronic pain  Pain Location: Back  Pain Descriptors: Dull, Tightness    Objective   Lumbar ROM:  Flexion: 68 degrees      Gait: Pt ambulates without device with wide NINA with decreased felicia and stride length.      Outcome Measures:  Other Measures  Oswestry Disablity Index (LMAAR): 16 raw score (Score as of 2025)    Treatments:  EXERCISES Date  2025 Date 2025               Nustep L5 10 minutes L5  10 mins L5 10 minutes         Shuttle   DLP      Shuttle   SLP             Shuttle   TR/HR      Sit to stand without UE 10x 10X 10x         Tband   Lat Pulls seated Black x 30 Black 30X Black x 30   Tband Skis Black x 30 Black 30X Black x  "30   Tband   Mid Row seated Black x 30 Black 30X Black x 30         Seated trunk flexion with ball 3 way x 10 each 3 way X 10 ea 3 way x 10 each   Seated hamstring stretch 30\" x 3 each 30\" X 3 30\"H x 3 each                     Seated hamstring curl 35# 3 x 20 40#  3 X 20 45# 3 x 20   Bilateral knee extension 25# 3 x 15 30#  3 X 15 30# 3 x 15         Back extension  70# 3 x 20 70#  3 X 20 70# 3 x 20                                                                         OP EDUCATION:       Goals:  Improve dynamic trunk strength so that patient can perform daily activities with minimal difficulty or back pain-4 weeks-6/30/25 PROGRESSING  Pt will be able to walk for at least 20 minutes without increased back pain-8 weeks-6/30/25 PROGRESSING  Pt will be able to stand and perform household chores or work activities for 30 minutes without increased back pain-8 weeks-6/30/25 PROGRESSING  Improve Modified LAMAR score < 8 to facilitate return to prior level of function-8 weeks-6/30/25 PROGRESSING  Pt will be independent with strengthening program that he can continue at local gym-8 weeks-6/30/25 PROGRESSINGV  "

## 2025-07-10 ENCOUNTER — TREATMENT (OUTPATIENT)
Dept: PHYSICAL THERAPY | Facility: CLINIC | Age: 80
End: 2025-07-10
Payer: COMMERCIAL

## 2025-07-10 DIAGNOSIS — M54.50 LOW BACK PAIN WITHOUT SCIATICA, UNSPECIFIED BACK PAIN LATERALITY, UNSPECIFIED CHRONICITY: Primary | ICD-10-CM

## 2025-07-10 PROCEDURE — 97110 THERAPEUTIC EXERCISES: CPT | Mod: GP,CQ

## 2025-07-10 ASSESSMENT — PAIN SCALES - GENERAL: PAINLEVEL_OUTOF10: 1

## 2025-07-10 ASSESSMENT — PAIN - FUNCTIONAL ASSESSMENT: PAIN_FUNCTIONAL_ASSESSMENT: 0-10

## 2025-07-10 ASSESSMENT — PAIN DESCRIPTION - DESCRIPTORS: DESCRIPTORS: ACHING;TIGHTNESS

## 2025-07-10 NOTE — PROGRESS NOTES
Physical Therapy    Physical Therapy Treatment    Patient Name: Tomas Morataya  MRN: [id number]  :1945  Today's Date: 7/10/2025    Time Entry:   Time Calculation  Start Time:   Stop Time:   Time Calculation (min): 48 min  PT Therapeutic Procedures Time Entry  Therapeutic Exercise Time Entry: 45          Visit:  11  Visit limit: 30 visits per year    Assessment:  Patient performed exercises without complaints of increased lumbar or lower extremity symptoms. Patient indicates continues to experience decreased lumbar symptoms with standing activities and with bending activities. Patient does experience tightness and occasional mild discomfort with quick/sudden changes in direction.       Plan:   Continue with trunk and lower extremity flexibility, stabilization and strengthening exercises progressing as tolerated to decrease pain with transfers, standing and walking activities.     Patient scheduled for a reassessment on 2025.    Current Problem  1. Low back pain without sciatica, unspecified back pain laterality, unspecified chronicity  Follow Up In Physical Therapy          Subjective    Patient reports back and legs feel okay today. Patient reports felt a little tired after previous treatment however no increases in pain levels.     Precautions  Precautions  Precautions Comment: falls risk    Pain  Pain Assessment  Pain Assessment: 0-10  0-10 (Numeric) Pain Score: 1  Pain Type: Chronic pain  Pain Location: Back  Pain Descriptors: Aching, Tightness    Objective   Lumbar ROM:  Flexion: 70 degrees      Outcome Measures:  Other Measures  Oswestry Disablity Index (LAMAR): 16 raw score (Score as on 2025)    Treatments:  EXERCISES Date  2025 Date 2025 Date  07/10/2025                 Nustep L5 10 minutes L5  10 mins L5 10 minutes L5 10 minutes          Shuttle   DLP       Shuttle   SLP              Shuttle   TR/HR       Sit to stand without UE 10x 10X 10x 10 x          Tband   Lat  "Pulls seated Black x 30 Black 30X Black x 30 Black x 30   Tband Skis Black x 30 Black 30X Black x 30 Black x 30   Tband   Mid Row seated Black x 30 Black 30X Black x 30 Black x 30          Seated trunk flexion with ball 3 way x 10 each 3 way X 10 ea 3 way x 10 each 3 way x 10 each   Seated hamstring stretch 30\" x 3 each 30\" X 3 30\"H x 3 each 30\"H x 3 each                        Seated hamstring curl 35# 3 x 20 40#  3 X 20 45# 3 x 20 45# 3 x 20   Bilateral knee extension 25# 3 x 15 30#  3 X 15 30# 3 x 15 30# 3 x 20          Back extension  70# 3 x 20 70#  3 X 20 70# 3 x 20 70# 3 x 20                                                                                    OP EDUCATION:       Goals:  Improve dynamic trunk strength so that patient can perform daily activities with minimal difficulty or back pain-4 weeks-6/30/25 PROGRESSING  Pt will be able to walk for at least 20 minutes without increased back pain-8 weeks-6/30/25 PROGRESSING  Pt will be able to stand and perform household chores or work activities for 30 minutes without increased back pain-8 weeks-6/30/25 PROGRESSING  Improve Modified LAMAR score < 8 to facilitate return to prior level of function-8 weeks-6/30/25 PROGRESSING  Pt will be independent with strengthening program that he can continue at local gym-8 weeks-6/30/25 PROGRESSINGV  "

## 2025-07-22 ENCOUNTER — TREATMENT (OUTPATIENT)
Dept: PHYSICAL THERAPY | Facility: CLINIC | Age: 80
End: 2025-07-22
Payer: COMMERCIAL

## 2025-07-22 DIAGNOSIS — M54.50 LOW BACK PAIN WITHOUT SCIATICA, UNSPECIFIED BACK PAIN LATERALITY, UNSPECIFIED CHRONICITY: Primary | ICD-10-CM

## 2025-07-22 PROCEDURE — 97110 THERAPEUTIC EXERCISES: CPT | Mod: GP,CQ

## 2025-07-22 ASSESSMENT — PAIN SCALES - GENERAL: PAINLEVEL_OUTOF10: 1

## 2025-07-22 ASSESSMENT — PAIN DESCRIPTION - DESCRIPTORS: DESCRIPTORS: TIGHTNESS;ACHING

## 2025-07-22 ASSESSMENT — PAIN - FUNCTIONAL ASSESSMENT: PAIN_FUNCTIONAL_ASSESSMENT: 0-10

## 2025-07-22 NOTE — PROGRESS NOTES
Physical Therapy    Physical Therapy Treatment    Patient Name: Tomas Morataya  MRN: [id number]  :1945  Today's Date: 2025    Time Entry:   Time Calculation  Start Time: 850  Stop Time: 932  Time Calculation (min): 42 min  PT Therapeutic Procedures Time Entry  Therapeutic Exercise Time Entry: 42          Visit:  12  Visit limit: 30 visits per year    Assessment:  Patient performed exercises without complaints of increased lumbar or lower extremity symptoms. Patient indicates continues to experience decreased lumbar symptoms with standing activities and with bending activities. Patient reports back symptoms tend to be increased only if having to get up at night vs throughout the day. Patient instructed in abdominal bracing with having to perform supine to sit to stand transfer to assist in managing pain symptoms.       Plan:   Continue with trunk and lower extremity flexibility, stabilization and strengthening exercises progressing as tolerated to decrease pain with transfers, standing and walking activities.     Patient scheduled for a reassessment on 2025.    Current Problem  1. Low back pain without sciatica, unspecified back pain laterality, unspecified chronicity  Follow Up In Physical Therapy          Subjective    Patient reports woke up in the middle of night and stood up and experienced sharp pain in low back. Patient indicates pain went away eventually with upright standing.    Precautions  Precautions  Precautions Comment: falls risk    Pain  Pain Assessment  Pain Assessment: 0-10  0-10 (Numeric) Pain Score: 1  Pain Type: Chronic pain  Pain Location: Back  Pain Descriptors: Tightness, Aching    Objective   Lumbar ROM:  Flexion: 70 degrees      Outcome Measures:  Other Measures  Oswestry Disablity Index (LAMAR): 16 raw score (Score as of 2025)    Treatments:  EXERCISES Date  2025           Nustep L5 10 minutes       Sit to stand without UE 10 x       Tband   Lat Pulls seated  "Black x 30   Tband Skis Black x 30   Tband   Mid Row seated Black x 30       Seated trunk flexion with ball 3 way x 10 each   Seated hamstring stretch 30\"H x 3 each               Seated hamstring curl 45# 3 x 20   Bilateral knee extension 30# 3 x 20       Back extension  70# 3 x 20                                                   OP EDUCATION:       Goals:  Improve dynamic trunk strength so that patient can perform daily activities with minimal difficulty or back pain-4 weeks-6/30/25 PROGRESSING  Pt will be able to walk for at least 20 minutes without increased back pain-8 weeks-6/30/25 PROGRESSING  Pt will be able to stand and perform household chores or work activities for 30 minutes without increased back pain-8 weeks-6/30/25 PROGRESSING  Improve Modified LAMAR score < 8 to facilitate return to prior level of function-8 weeks-6/30/25 PROGRESSING  Pt will be independent with strengthening program that he can continue at local gym-8 weeks-6/30/25 PROGRESSINGV  "

## 2025-07-25 ENCOUNTER — TREATMENT (OUTPATIENT)
Dept: PHYSICAL THERAPY | Facility: CLINIC | Age: 80
End: 2025-07-25
Payer: COMMERCIAL

## 2025-07-25 ENCOUNTER — DOCUMENTATION (OUTPATIENT)
Dept: PHYSICAL THERAPY | Facility: CLINIC | Age: 80
End: 2025-07-25
Payer: COMMERCIAL

## 2025-07-25 DIAGNOSIS — M54.50 LOW BACK PAIN WITHOUT SCIATICA, UNSPECIFIED BACK PAIN LATERALITY, UNSPECIFIED CHRONICITY: Primary | ICD-10-CM

## 2025-07-25 NOTE — PROGRESS NOTES
Physical Therapy                 Therapy Communication Note    Patient Name: Tomas Morataya  MRN: [id number]  Department:   Room: Room/bed info not found  Today's Date: 7/25/2025     Discipline: Physical Therapy    Missed Visit:       Missed Visit Reason:      Missed Time: Cancel    Comment:Patient arrived for therapy stating he fell in front entrance of the building on his right knee. Feels sore, advised to go to ER/urgent care, patient declined. Cancelled PT.

## 2025-07-30 ENCOUNTER — TREATMENT (OUTPATIENT)
Dept: PHYSICAL THERAPY | Facility: CLINIC | Age: 80
End: 2025-07-30
Payer: COMMERCIAL

## 2025-07-30 DIAGNOSIS — M54.50 LOW BACK PAIN WITHOUT SCIATICA, UNSPECIFIED BACK PAIN LATERALITY, UNSPECIFIED CHRONICITY: Primary | ICD-10-CM

## 2025-07-30 PROCEDURE — 97110 THERAPEUTIC EXERCISES: CPT | Mod: GP | Performed by: PHYSICAL THERAPIST

## 2025-07-30 ASSESSMENT — PAIN DESCRIPTION - DESCRIPTORS: DESCRIPTORS: TIGHTNESS

## 2025-07-30 ASSESSMENT — PAIN - FUNCTIONAL ASSESSMENT: PAIN_FUNCTIONAL_ASSESSMENT: 0-10

## 2025-07-30 ASSESSMENT — PAIN SCALES - GENERAL: PAINLEVEL_OUTOF10: 2

## 2025-07-30 NOTE — PROGRESS NOTES
"Physical Therapy    Physical Therapy Treatment    Patient Name: Tomas Morataya  MRN: [id number]  :1945  Today's Date: 2025    Time Entry:   Time Calculation  Start Time: 335  Stop Time: 430  Time Calculation (min): 55 min  PT Therapeutic Procedures Time Entry  Therapeutic Exercise Time Entry: 50     Non-Billable Time  Non-billable time: 5  Non-billable time reason: rest time    Visit:  13  Visit limit: 30 visits per year    Assessment:  Pt reports that he is feeling \"clumsy\" today. No dizziness or weakness. Continues to have some mild left sided LBP throughout treatment but otherwise tolerated treatment well.    Plan:   Continue with trunk and lower extremity flexibility, stabilization and strengthening exercises progressing as tolerated to decrease pain with transfers, standing and walking activities.     Patient scheduled for a reassessment on 2025.    Current Problem  1. Low back pain without sciatica, unspecified back pain laterality, unspecified chronicity  Follow Up In Physical Therapy          Subjective    Pt reports that he is having a lot of tightness along his lower back when he is standing. Can sit as long as he wants without discomfort.     Precautions  Precautions  Precautions Comment: falls risk    Pain  Pain Assessment  Pain Assessment: 0-10  0-10 (Numeric) Pain Score: 2  Pain Location: Back  Pain Orientation: Lower  Pain Descriptors: Tightness    Objective   Lumbar ROM:  Flexion: 70 degrees      Outcome Measures:       Treatments:  EXERCISES Date  2025             Nustep L5 10 minutes L5  10 mins        Sit to stand without UE 10 x         Tband   Lat Pulls seated Black x 30 Black X 30   Tband Skis Black x 30 Black X 30   Tband   Mid Row seated Black x 30 Black X 30        Seated trunk flexion with ball 3 way x 10 each 3 way X 10 ea   Seated hamstring stretch 30\"H x 3 each 30\"H X 3 ea                  Seated hamstring curl 45# 3 x 20 45#  3 X 20   Bilateral knee " extension 30# 3 x 20 30#  3 X 20        Back extension  70# 3 x 20 70#  3 X 20                                                              OP EDUCATION:       Goals:  Improve dynamic trunk strength so that patient can perform daily activities with minimal difficulty or back pain-4 weeks-6/30/25 PROGRESSING  Pt will be able to walk for at least 20 minutes without increased back pain-8 weeks-6/30/25 PROGRESSING  Pt will be able to stand and perform household chores or work activities for 30 minutes without increased back pain-8 weeks-6/30/25 PROGRESSING  Improve Modified LAMAR score < 8 to facilitate return to prior level of function-8 weeks-6/30/25 PROGRESSING  Pt will be independent with strengthening program that he can continue at local gym-8 weeks-6/30/25 PROGRESSINGV

## 2025-08-01 ENCOUNTER — TREATMENT (OUTPATIENT)
Dept: PHYSICAL THERAPY | Facility: CLINIC | Age: 80
End: 2025-08-01
Payer: COMMERCIAL

## 2025-08-01 DIAGNOSIS — M54.50 LOW BACK PAIN WITHOUT SCIATICA, UNSPECIFIED BACK PAIN LATERALITY, UNSPECIFIED CHRONICITY: Primary | ICD-10-CM

## 2025-08-01 PROCEDURE — 97110 THERAPEUTIC EXERCISES: CPT | Mod: GP | Performed by: PHYSICAL THERAPIST

## 2025-08-01 ASSESSMENT — PAIN SCALES - GENERAL: PAINLEVEL_OUTOF10: 0 - NO PAIN

## 2025-08-01 ASSESSMENT — PAIN - FUNCTIONAL ASSESSMENT: PAIN_FUNCTIONAL_ASSESSMENT: 0-10

## 2025-08-01 NOTE — PROGRESS NOTES
"Physical Therapy    Physical Therapy Treatment    Patient Name: Tomas Morataya  MRN: [id number]  :1945  Today's Date: 2025    Time Entry:   Time Calculation  Start Time: 0900  Stop Time: 945  Time Calculation (min): 45 min  PT Therapeutic Procedures Time Entry  Therapeutic Exercise Time Entry: 45          Visit:  14  Visit limit: 30 visits per year    Assessment:  No progression of strengthening this visit due to increased soreness in LE's yesterday. Will progress next visit as tolerated.      Plan:   Continue with trunk and lower extremity flexibility, stabilization and strengthening exercises progressing as tolerated to decrease pain with transfers, standing and walking activities.     Patient scheduled for a reassessment on 2025.    Current Problem  1. Low back pain without sciatica, unspecified back pain laterality, unspecified chronicity  Follow Up In Physical Therapy          Subjective    Pt reports that he had a bad day yesterday with a lot of stiffness and pain in both legs.Unsure what aggravated pain. Feels ok today and is not having any pain today.    Precautions  Precautions  Precautions Comment: falls risk    Pain  Pain Assessment  Pain Assessment: 0-10  0-10 (Numeric) Pain Score: 0 - No pain    Objective   Lumbar ROM:  Flexion: 70 degrees      Outcome Measures:       Treatments:  EXERCISES Date  2025               Nustep L5 10 minutes L5  10 mins L5  10 mins         Sit to stand without UE 10 x           Tband   Lat Pulls seated Black x 30 Black X 30 Black X 30   Tband Skis Black x 30 Black X 30 Black X 30   Tband   Mid Row seated Black x 30 Black X 30 Black X 30         Seated trunk flexion with ball 3 way x 10 each 3 way X 10 ea 3 way  X 10   Seated hamstring stretch 30\"H x 3 each 30\"H X 3 ea 30\"H  X3 ea                     Seated hamstring curl 45# 3 x 20 40#  3 X 20 40#  3 X 15   Bilateral knee extension 30# 3 x 20 30#  3 X 20 30#  3 X 15         Back extension  " 70# 3 x 20 70#  3 X 20 70#  3 X 20                                                                         OP EDUCATION:       Goals:  Improve dynamic trunk strength so that patient can perform daily activities with minimal difficulty or back pain-4 weeks-6/30/25 PROGRESSING  Pt will be able to walk for at least 20 minutes without increased back pain-8 weeks-6/30/25 PROGRESSING  Pt will be able to stand and perform household chores or work activities for 30 minutes without increased back pain-8 weeks-6/30/25 PROGRESSING  Improve Modified LAMAR score < 8 to facilitate return to prior level of function-8 weeks-6/30/25 PROGRESSING  Pt will be independent with strengthening program that he can continue at local gym-8 weeks-6/30/25 PROGRESSINGV

## 2025-08-04 ENCOUNTER — TREATMENT (OUTPATIENT)
Dept: PHYSICAL THERAPY | Facility: CLINIC | Age: 80
End: 2025-08-04
Payer: COMMERCIAL

## 2025-08-04 DIAGNOSIS — M54.50 LOW BACK PAIN WITHOUT SCIATICA, UNSPECIFIED BACK PAIN LATERALITY, UNSPECIFIED CHRONICITY: Primary | ICD-10-CM

## 2025-08-04 PROCEDURE — 97110 THERAPEUTIC EXERCISES: CPT | Mod: GP | Performed by: PHYSICAL THERAPIST

## 2025-08-04 ASSESSMENT — PAIN - FUNCTIONAL ASSESSMENT: PAIN_FUNCTIONAL_ASSESSMENT: 0-10

## 2025-08-04 ASSESSMENT — PAIN SCALES - GENERAL: PAINLEVEL_OUTOF10: 0 - NO PAIN

## 2025-08-04 NOTE — PROGRESS NOTES
"Physical Therapy    Physical Therapy Treatment    Patient Name: Tomas Morataya  MRN: [id number]  :1945  Today's Date: 2025    Time Entry:   Time Calculation  Start Time: 0900  Stop Time: 0945  Time Calculation (min): 45 min  PT Therapeutic Procedures Time Entry  Therapeutic Exercise Time Entry: 45          Visit:  15  Visit limit: 30 visits per year    Assessment:  Improved tolerance to strengthening today. Fewer rest breaks needed.      Plan:   Continue with trunk and lower extremity flexibility, stabilization and strengthening exercises progressing as tolerated to decrease pain with transfers, standing and walking activities.     Patient scheduled for a reassessment on 2025.    Current Problem  1. Low back pain without sciatica, unspecified back pain laterality, unspecified chronicity  Follow Up In Physical Therapy          Subjective    Pt reports that he is not currently having any back pain, but had a lot of pain yesterday evening and some pain first thing this morning.    Precautions  Precautions  Precautions Comment: falls risk    Pain  Pain Assessment  Pain Assessment: 0-10  0-10 (Numeric) Pain Score: 0 - No pain    Objective   Lumbar ROM:  Flexion: 70 degrees      Outcome Measures:       Treatments:  EXERCISES Date  2025                 Nustep L5 10 minutes L5  10 mins L5  10 mins L5  10 mins          Sit to stand without UE 10 x             Tband   Lat Pulls seated Black x 30 Black X 30 Black X 30 Black X 30   Tband Skis Black x 30 Black X 30 Black X 30 Black X 30   Tband   Mid Row seated Black x 30 Black X 30 Black X 30 Black X 30          Seated trunk flexion with ball 3 way x 10 each 3 way X 10 ea 3 way  X 10 3 way X 10   Seated hamstring stretch 30\"H x 3 each 30\"H X 3 ea 30\"H  X3 ea 30\"H X 3 ea                        Seated hamstring curl 45# 3 x 20 40#  3 X 20 40#  3 X 15 40#  3 X 15   Bilateral knee extension 30# 3 x 20 30#  3 X 20 30#  3 X 15 30#  3 X 15    "       Back extension  70# 3 x 20 70#  3 X 20 70#  3 X 20 70#  3 X 20                                                                                    OP EDUCATION:       Goals:  Improve dynamic trunk strength so that patient can perform daily activities with minimal difficulty or back pain-4 weeks-6/30/25 PROGRESSING  Pt will be able to walk for at least 20 minutes without increased back pain-8 weeks-6/30/25 PROGRESSING  Pt will be able to stand and perform household chores or work activities for 30 minutes without increased back pain-8 weeks-6/30/25 PROGRESSING  Improve Modified LAMAR score < 8 to facilitate return to prior level of function-8 weeks-6/30/25 PROGRESSING  Pt will be independent with strengthening program that he can continue at local gym-8 weeks-6/30/25 PROGRESSINGV

## 2025-08-06 ENCOUNTER — TREATMENT (OUTPATIENT)
Dept: PHYSICAL THERAPY | Facility: CLINIC | Age: 80
End: 2025-08-06
Payer: COMMERCIAL

## 2025-08-06 DIAGNOSIS — M54.50 LOW BACK PAIN WITHOUT SCIATICA, UNSPECIFIED BACK PAIN LATERALITY, UNSPECIFIED CHRONICITY: Primary | ICD-10-CM

## 2025-08-06 PROCEDURE — 97110 THERAPEUTIC EXERCISES: CPT | Mod: GP | Performed by: PHYSICAL THERAPIST

## 2025-08-06 ASSESSMENT — PAIN SCALES - GENERAL: PAINLEVEL_OUTOF10: 3

## 2025-08-06 ASSESSMENT — PAIN - FUNCTIONAL ASSESSMENT: PAIN_FUNCTIONAL_ASSESSMENT: 0-10

## 2025-08-06 NOTE — PROGRESS NOTES
Physical Therapy    Physical Therapy Treatment/Reassessment    Patient Name: Tomas Morataya  MRN: [id number]  :1945  Today's Date: 2025    Time Entry:   Time Calculation  Start Time: 1030  Stop Time: 1130  Time Calculation (min): 60 min  PT Therapeutic Procedures Time Entry  Therapeutic Exercise Time Entry: 50     Non-Billable Time  Non-billable time: 10  Non-billable time reason: HP    Visit:  16  Visit limit: 30 visits per year    Assessment:  Functional activities and ambulation continue to be limited by pain. Pt would benefit from continued PT to increase trunk and LE strength. Pt was encouraged to perform home strengthening program more often. Pt was encouraged to follow up with Dr Galvez due to persistent back pain.      Plan:   Continue with trunk and lower extremity flexibility, stabilization and strengthening exercises progressing as tolerated to decrease pain with transfers, standing and walking activities.         Current Problem  1. Low back pain without sciatica, unspecified back pain laterality, unspecified chronicity  Follow Up In Physical Therapy    Follow Up In Physical Therapy          Subjective    Pt reports that he has been having increased lower back and gluteal pain the past few days. Has been using a walker at night because he feels that he might fall. Feels that his pain is worse especially in the evening and afternoons.    Precautions  Precautions  Precautions Comment: falls risk    Pain  Pain Assessment  Pain Assessment: 0-10  0-10 (Numeric) Pain Score: 3  Pain Location: Back    Objective   Lumbar ROM:  Flexion: 70 degrees  Extension: 0 degrees      Outcome Measures:  Other Measures  Oswestry Disablity Index (LAMAR): 17    Treatments:  EXERCISES Date  2025                   Nustep L5 10 minutes L5  10 mins L5  10 mins L5  10 mins L5  10 mins           Sit to stand without UE 10 x               Tband   Lat Pulls seated Black x 30 Black X 30 Black X 30  "Black X 30 Black X 30   Tband Skis Black x 30 Black X 30 Black X 30 Black X 30 Black X 30   Tband   Mid Row seated Black x 30 Black X 30 Black X 30 Black X 30 Black X 30           Seated trunk flexion with ball 3 way x 10 each 3 way X 10 ea 3 way  X 10 3 way X 10    Seated hamstring stretch 30\"H x 3 each 30\"H X 3 ea 30\"H  X3 ea 30\"H X 3 ea                            Seated hamstring curl 45# 3 x 20 40#  3 X 20 40#  3 X 15 40#  3 X 15 40#  3 X 15   Bilateral knee extension 30# 3 x 20 30#  3 X 20 30#  3 X 15 30#  3 X 15 30#  3 X 15           Back extension  70# 3 x 20 70#  3 X 20 70#  3 X 20 70#  3 X 20 60#  3 X 20                                                                                               OP EDUCATION:       Goals:  Improve dynamic trunk strength so that patient can perform daily activities with minimal difficulty or back pain-4 weeks-8/6/25 PROGRESSING  Pt will be able to walk for at least 20 minutes without increased back pain-8 weeks-8/6/25 PROGRESSING  Pt will be able to stand and perform household chores or work activities for 30 minutes without increased back pain-8 weeks-8/6/25 PROGRESSING  Improve Modified LAMAR score < 8 to facilitate return to prior level of function-8 weeks-8/6/25 PROGRESSING  Pt will be independent with strengthening program that he can continue at local gym-8 weeks-8/6/25 PROGRESSING  "

## 2025-08-18 ENCOUNTER — TREATMENT (OUTPATIENT)
Dept: PHYSICAL THERAPY | Facility: CLINIC | Age: 80
End: 2025-08-18
Payer: COMMERCIAL

## 2025-08-18 DIAGNOSIS — M54.50 LOW BACK PAIN WITHOUT SCIATICA, UNSPECIFIED BACK PAIN LATERALITY, UNSPECIFIED CHRONICITY: Primary | ICD-10-CM

## 2025-08-18 PROCEDURE — 97110 THERAPEUTIC EXERCISES: CPT | Mod: GP,CQ

## 2025-08-18 ASSESSMENT — PAIN SCALES - GENERAL: PAINLEVEL_OUTOF10: 2

## 2025-08-18 ASSESSMENT — PAIN DESCRIPTION - DESCRIPTORS: DESCRIPTORS: ACHING;TIGHTNESS

## 2025-08-18 ASSESSMENT — PAIN - FUNCTIONAL ASSESSMENT: PAIN_FUNCTIONAL_ASSESSMENT: 0-10

## 2025-08-19 ENCOUNTER — OFFICE VISIT (OUTPATIENT)
Dept: URGENT CARE | Age: 80
End: 2025-08-19
Payer: COMMERCIAL

## 2025-08-19 VITALS
RESPIRATION RATE: 17 BRPM | SYSTOLIC BLOOD PRESSURE: 148 MMHG | OXYGEN SATURATION: 98 % | HEART RATE: 96 BPM | DIASTOLIC BLOOD PRESSURE: 75 MMHG | TEMPERATURE: 98.1 F

## 2025-08-19 DIAGNOSIS — R35.0 URINE FREQUENCY: ICD-10-CM

## 2025-08-19 LAB
POC APPEARANCE, URINE: CLEAR
POC BILIRUBIN, URINE: NEGATIVE
POC BLOOD, URINE: NEGATIVE
POC COLOR, URINE: YELLOW
POC GLUCOSE, URINE: NEGATIVE MG/DL
POC KETONES, URINE: NEGATIVE MG/DL
POC LEUKOCYTES, URINE: NEGATIVE
POC NITRITE,URINE: NEGATIVE
POC PH, URINE: 6 PH
POC PROTEIN, URINE: NEGATIVE MG/DL
POC SPECIFIC GRAVITY, URINE: 1.01
POC UROBILINOGEN, URINE: 0.2 EU/DL

## 2025-08-19 PROCEDURE — 1160F RVW MEDS BY RX/DR IN RCRD: CPT

## 2025-08-19 PROCEDURE — 1159F MED LIST DOCD IN RCRD: CPT

## 2025-08-19 PROCEDURE — 3078F DIAST BP <80 MM HG: CPT

## 2025-08-19 PROCEDURE — 99213 OFFICE O/P EST LOW 20 MIN: CPT

## 2025-08-19 PROCEDURE — 3077F SYST BP >= 140 MM HG: CPT

## 2025-08-19 PROCEDURE — 81003 URINALYSIS AUTO W/O SCOPE: CPT

## 2025-08-19 PROCEDURE — 1036F TOBACCO NON-USER: CPT

## 2025-08-19 RX ORDER — NITROFURANTOIN 25; 75 MG/1; MG/1
100 CAPSULE ORAL 2 TIMES DAILY
Qty: 14 CAPSULE | Refills: 0 | Status: SHIPPED | OUTPATIENT
Start: 2025-08-19 | End: 2025-08-26

## 2025-08-21 LAB — BACTERIA UR CULT: NORMAL

## 2025-08-22 ENCOUNTER — TREATMENT (OUTPATIENT)
Dept: PHYSICAL THERAPY | Facility: CLINIC | Age: 80
End: 2025-08-22
Payer: COMMERCIAL

## 2025-08-22 DIAGNOSIS — M54.50 LOW BACK PAIN WITHOUT SCIATICA, UNSPECIFIED BACK PAIN LATERALITY, UNSPECIFIED CHRONICITY: Primary | ICD-10-CM

## 2025-08-22 PROCEDURE — 97110 THERAPEUTIC EXERCISES: CPT | Mod: GP | Performed by: PHYSICAL THERAPIST

## 2025-08-22 ASSESSMENT — PAIN SCALES - GENERAL: PAINLEVEL_OUTOF10: 4

## 2025-08-22 ASSESSMENT — PAIN - FUNCTIONAL ASSESSMENT: PAIN_FUNCTIONAL_ASSESSMENT: 0-10

## 2025-08-25 ENCOUNTER — TREATMENT (OUTPATIENT)
Dept: PHYSICAL THERAPY | Facility: CLINIC | Age: 80
End: 2025-08-25
Payer: COMMERCIAL

## 2025-08-25 DIAGNOSIS — M54.50 LOW BACK PAIN WITHOUT SCIATICA, UNSPECIFIED BACK PAIN LATERALITY, UNSPECIFIED CHRONICITY: Primary | ICD-10-CM

## 2025-08-25 PROCEDURE — 97110 THERAPEUTIC EXERCISES: CPT | Mod: GP,CQ

## 2025-08-25 ASSESSMENT — PAIN DESCRIPTION - DESCRIPTORS: DESCRIPTORS: ACHING;SORE

## 2025-08-25 ASSESSMENT — PAIN - FUNCTIONAL ASSESSMENT: PAIN_FUNCTIONAL_ASSESSMENT: 0-10

## 2025-08-25 ASSESSMENT — PAIN SCALES - GENERAL: PAINLEVEL_OUTOF10: 3

## 2025-08-29 ENCOUNTER — TREATMENT (OUTPATIENT)
Dept: PHYSICAL THERAPY | Facility: CLINIC | Age: 80
End: 2025-08-29
Payer: COMMERCIAL

## 2025-08-29 DIAGNOSIS — M54.50 LOW BACK PAIN WITHOUT SCIATICA, UNSPECIFIED BACK PAIN LATERALITY, UNSPECIFIED CHRONICITY: Primary | ICD-10-CM

## 2025-08-29 PROCEDURE — 97110 THERAPEUTIC EXERCISES: CPT | Mod: GP,CQ

## 2025-08-29 ASSESSMENT — PAIN - FUNCTIONAL ASSESSMENT: PAIN_FUNCTIONAL_ASSESSMENT: 0-10

## 2025-08-29 ASSESSMENT — PAIN SCALES - GENERAL: PAINLEVEL_OUTOF10: 3

## 2025-08-29 ASSESSMENT — PAIN DESCRIPTION - DESCRIPTORS: DESCRIPTORS: ACHING;SORE;TIGHTNESS

## 2025-08-30 ASSESSMENT — DERMATOLOGY QUALITY OF LIFE (QOL) ASSESSMENT
RATE HOW BOTHERED YOU ARE BY EFFECTS OF YOUR SKIN PROBLEMS ON YOUR ACTIVITIES (EG, GOING OUT, ACCOMPLISHING WHAT YOU WANT, WORK ACTIVITIES OR YOUR RELATIONSHIPS WITH OTHERS): 0 - NEVER BOTHERED
RATE HOW BOTHERED YOU ARE BY EFFECTS OF YOUR SKIN PROBLEMS ON YOUR ACTIVITIES (EG, GOING OUT, ACCOMPLISHING WHAT YOU WANT, WORK ACTIVITIES OR YOUR RELATIONSHIPS WITH OTHERS): 0 - NEVER BOTHERED
RATE HOW BOTHERED YOU ARE BY SYMPTOMS OF YOUR SKIN PROBLEM (EG, ITCHING, STINGING BURNING, HURTING OR SKIN IRRITATION): 1
RATE HOW EMOTIONALLY BOTHERED YOU ARE BY YOUR SKIN PROBLEM (FOR EXAMPLE, WORRY, EMBARRASSMENT, FRUSTRATION): 0 - NEVER BOTHERED
RATE HOW EMOTIONALLY BOTHERED YOU ARE BY YOUR SKIN PROBLEM (FOR EXAMPLE, WORRY, EMBARRASSMENT, FRUSTRATION): 0 - NEVER BOTHERED
RATE HOW BOTHERED YOU ARE BY SYMPTOMS OF YOUR SKIN PROBLEM (EG, ITCHING, STINGING BURNING, HURTING OR SKIN IRRITATION): 1

## 2025-09-02 ENCOUNTER — TREATMENT (OUTPATIENT)
Dept: PHYSICAL THERAPY | Facility: CLINIC | Age: 80
End: 2025-09-02
Payer: COMMERCIAL

## 2025-09-02 DIAGNOSIS — M54.50 LOW BACK PAIN WITHOUT SCIATICA, UNSPECIFIED BACK PAIN LATERALITY, UNSPECIFIED CHRONICITY: Primary | ICD-10-CM

## 2025-09-02 PROCEDURE — 97110 THERAPEUTIC EXERCISES: CPT | Mod: GP | Performed by: PHYSICAL THERAPIST

## 2025-09-03 ENCOUNTER — APPOINTMENT (OUTPATIENT)
Dept: DERMATOLOGY | Facility: CLINIC | Age: 80
End: 2025-09-03
Payer: COMMERCIAL

## 2025-09-05 ENCOUNTER — TREATMENT (OUTPATIENT)
Dept: PHYSICAL THERAPY | Facility: CLINIC | Age: 80
End: 2025-09-05
Payer: COMMERCIAL

## 2025-09-05 DIAGNOSIS — M54.50 LOW BACK PAIN WITHOUT SCIATICA, UNSPECIFIED BACK PAIN LATERALITY, UNSPECIFIED CHRONICITY: Primary | ICD-10-CM

## 2025-09-05 PROCEDURE — 97110 THERAPEUTIC EXERCISES: CPT | Mod: GP,CQ

## 2025-09-05 ASSESSMENT — PAIN - FUNCTIONAL ASSESSMENT: PAIN_FUNCTIONAL_ASSESSMENT: 0-10

## 2025-09-05 ASSESSMENT — PAIN SCALES - GENERAL: PAINLEVEL_OUTOF10: 2

## 2025-09-05 ASSESSMENT — PAIN DESCRIPTION - DESCRIPTORS: DESCRIPTORS: ACHING

## 2025-10-13 ENCOUNTER — APPOINTMENT (OUTPATIENT)
Dept: PRIMARY CARE | Facility: CLINIC | Age: 80
End: 2025-10-13
Payer: COMMERCIAL

## 2026-03-11 ENCOUNTER — APPOINTMENT (OUTPATIENT)
Dept: DERMATOLOGY | Facility: CLINIC | Age: 81
End: 2026-03-11
Payer: COMMERCIAL

## (undated) DEVICE — GOWN, ASTOUND, L

## (undated) DEVICE — SUTURE, VICRYL, 3-0, 27 IN, SH, VIOLET

## (undated) DEVICE — SUTURE, VICRYL, 2-0, 27 IN, CT-1, VIOLET

## (undated) DEVICE — Device

## (undated) DEVICE — SUTURE, MONOCRYL, 4-0, 18 IN, PS2, UNDYED

## (undated) DEVICE — DRESSING, GAUZE, WASHED FLUFF, LARGE, STERILE

## (undated) DEVICE — DRAPE, SHEET, LAPAROTOMY, W/ISO-BAC, W/ARMBOARD COVERS, 98 X 122 IN, DISPOSABLE, LF, STERILE

## (undated) DEVICE — ADHESIVE, SKIN, DERMABOND ADVANCED, 15CM, PEN-STYLE

## (undated) DEVICE — MANIFOLD, 4 PORT NEPTUNE STANDARD

## (undated) DEVICE — SUPPORTER, ATHLETIC, ADULT, X-LARGE

## (undated) DEVICE — COVER, CART, 45 X 27 X 48 IN, CLEAR